# Patient Record
Sex: FEMALE | Race: WHITE | Employment: OTHER | URBAN - METROPOLITAN AREA
[De-identification: names, ages, dates, MRNs, and addresses within clinical notes are randomized per-mention and may not be internally consistent; named-entity substitution may affect disease eponyms.]

---

## 2017-03-06 ENCOUNTER — OFFICE VISIT (OUTPATIENT)
Dept: ORTHOPEDIC SURGERY | Age: 57
End: 2017-03-06

## 2017-03-06 VITALS
TEMPERATURE: 98 F | HEIGHT: 66 IN | BODY MASS INDEX: 34.72 KG/M2 | WEIGHT: 216 LBS | HEART RATE: 71 BPM | SYSTOLIC BLOOD PRESSURE: 152 MMHG | DIASTOLIC BLOOD PRESSURE: 90 MMHG

## 2017-03-06 DIAGNOSIS — Z96.652 STATUS POST TOTAL LEFT KNEE REPLACEMENT: Primary | ICD-10-CM

## 2017-03-06 DIAGNOSIS — M17.11 PRIMARY OSTEOARTHRITIS OF RIGHT KNEE: ICD-10-CM

## 2017-03-06 PROCEDURE — 99213 OFFICE O/P EST LOW 20 MIN: CPT | Performed by: ORTHOPAEDIC SURGERY

## 2017-03-06 PROCEDURE — 73562 X-RAY EXAM OF KNEE 3: CPT | Performed by: ORTHOPAEDIC SURGERY

## 2017-03-06 PROCEDURE — 20610 DRAIN/INJ JOINT/BURSA W/O US: CPT | Performed by: ORTHOPAEDIC SURGERY

## 2017-03-14 ENCOUNTER — HOSPITAL ENCOUNTER (OUTPATIENT)
Dept: ENDOSCOPY | Age: 57
Discharge: HOME | End: 2017-03-14
Attending: INTERNAL MEDICINE | Admitting: INTERNAL MEDICINE

## 2017-03-14 VITALS
WEIGHT: 216 LBS | TEMPERATURE: 98.9 F | DIASTOLIC BLOOD PRESSURE: 56 MMHG | HEART RATE: 70 BPM | HEIGHT: 66 IN | OXYGEN SATURATION: 97 % | RESPIRATION RATE: 18 BRPM | SYSTOLIC BLOOD PRESSURE: 110 MMHG | BODY MASS INDEX: 34.72 KG/M2

## 2017-03-14 RX ORDER — SODIUM CHLORIDE 0.9 % (FLUSH) 0.9 %
10 SYRINGE (ML) INJECTION EVERY 12 HOURS SCHEDULED
Status: DISCONTINUED | OUTPATIENT
Start: 2017-03-14 | End: 2017-03-15 | Stop reason: HOSPADM

## 2017-03-14 RX ORDER — SODIUM CHLORIDE 0.9 % (FLUSH) 0.9 %
10 SYRINGE (ML) INJECTION PRN
Status: DISCONTINUED | OUTPATIENT
Start: 2017-03-14 | End: 2017-03-15 | Stop reason: HOSPADM

## 2017-03-14 RX ORDER — MULTIVIT WITH MINERALS/LUTEIN
1000 TABLET ORAL DAILY
COMMUNITY
End: 2019-06-10

## 2017-03-14 RX ORDER — SODIUM CHLORIDE 9 MG/ML
INJECTION, SOLUTION INTRAVENOUS CONTINUOUS
Status: DISCONTINUED | OUTPATIENT
Start: 2017-03-14 | End: 2017-03-15 | Stop reason: HOSPADM

## 2017-03-14 RX ORDER — PROMETHAZINE HYDROCHLORIDE 25 MG/ML
6.25 INJECTION, SOLUTION INTRAMUSCULAR; INTRAVENOUS
Status: ACTIVE | OUTPATIENT
Start: 2017-03-14 | End: 2017-03-14

## 2017-03-14 RX ORDER — LABETALOL HYDROCHLORIDE 5 MG/ML
5 INJECTION, SOLUTION INTRAVENOUS EVERY 10 MIN PRN
Status: DISCONTINUED | OUTPATIENT
Start: 2017-03-14 | End: 2017-03-15 | Stop reason: HOSPADM

## 2017-03-14 RX ADMIN — SODIUM CHLORIDE: 9 INJECTION, SOLUTION INTRAVENOUS at 08:30

## 2017-03-14 ASSESSMENT — PAIN SCALES - GENERAL
PAINLEVEL_OUTOF10: 0

## 2017-03-14 ASSESSMENT — PAIN - FUNCTIONAL ASSESSMENT: PAIN_FUNCTIONAL_ASSESSMENT: 0-10

## 2017-03-14 ASSESSMENT — PAIN DESCRIPTION - DESCRIPTORS: DESCRIPTORS: CRAMPING

## 2017-03-14 ASSESSMENT — ENCOUNTER SYMPTOMS: SHORTNESS OF BREATH: 0

## 2017-07-17 ENCOUNTER — OFFICE VISIT (OUTPATIENT)
Dept: ORTHOPEDIC SURGERY | Age: 57
End: 2017-07-17

## 2017-07-17 VITALS
SYSTOLIC BLOOD PRESSURE: 126 MMHG | WEIGHT: 216 LBS | HEIGHT: 66 IN | BODY MASS INDEX: 34.72 KG/M2 | TEMPERATURE: 98.4 F | HEART RATE: 87 BPM | DIASTOLIC BLOOD PRESSURE: 84 MMHG

## 2017-07-17 DIAGNOSIS — Z96.652 STATUS POST TOTAL LEFT KNEE REPLACEMENT: Primary | ICD-10-CM

## 2017-07-17 DIAGNOSIS — M17.11 ARTHRITIS OF RIGHT KNEE: ICD-10-CM

## 2017-07-17 PROCEDURE — 99214 OFFICE O/P EST MOD 30 MIN: CPT | Performed by: ORTHOPAEDIC SURGERY

## 2017-07-24 ENCOUNTER — TELEPHONE (OUTPATIENT)
Dept: ORTHOPEDIC SURGERY | Age: 57
End: 2017-07-24

## 2017-07-24 DIAGNOSIS — M79.662 BILATERAL CALF PAIN: Primary | ICD-10-CM

## 2017-07-24 DIAGNOSIS — M79.661 BILATERAL CALF PAIN: Primary | ICD-10-CM

## 2017-07-25 ENCOUNTER — TELEPHONE (OUTPATIENT)
Dept: ORTHOPEDIC SURGERY | Age: 57
End: 2017-07-25

## 2017-08-21 ENCOUNTER — HOSPITAL ENCOUNTER (OUTPATIENT)
Dept: VASCULAR LAB | Age: 57
Discharge: HOME | End: 2017-08-21
Attending: ORTHOPAEDIC SURGERY | Admitting: ORTHOPAEDIC SURGERY

## 2017-08-29 ENCOUNTER — HOSPITAL ENCOUNTER (OUTPATIENT)
Dept: PREADMISSION TESTING | Age: 57
Discharge: HOME | End: 2017-08-29
Attending: ORTHOPAEDIC SURGERY | Admitting: ORTHOPAEDIC SURGERY

## 2017-08-29 DIAGNOSIS — Z01.818 ENCOUNTER FOR PREADMISSION TESTING: ICD-10-CM

## 2017-08-29 LAB
ABO/RH: NORMAL
ALBUMIN SERPL-MCNC: 4.6 G/DL (ref 3.4–5)
ANION GAP SERPL CALCULATED.3IONS-SCNC: 14 MMOL/L (ref 3–16)
ANTIBODY SCREEN: NORMAL
APTT: 33 SEC (ref 24.1–34.9)
BASOPHILS ABSOLUTE: 0.1 K/UL (ref 0–0.2)
BASOPHILS RELATIVE PERCENT: 1.2 %
BILIRUBIN URINE: NEGATIVE
BLOOD, URINE: NEGATIVE
BUN BLDV-MCNC: 19 MG/DL (ref 7–20)
CALCIUM SERPL-MCNC: 10 MG/DL (ref 8.3–10.6)
CHLORIDE BLD-SCNC: 100 MMOL/L (ref 99–110)
CLARITY: CLEAR
CO2: 24 MMOL/L (ref 21–32)
COLOR: YELLOW
CREAT SERPL-MCNC: 0.6 MG/DL (ref 0.6–1.1)
EKG ATRIAL RATE: 72 BPM
EKG DIAGNOSIS: NORMAL
EKG P AXIS: 5 DEGREES
EKG P-R INTERVAL: 176 MS
EKG Q-T INTERVAL: 394 MS
EKG QRS DURATION: 86 MS
EKG QTC CALCULATION (BAZETT): 431 MS
EKG R AXIS: -3 DEGREES
EKG T AXIS: 5 DEGREES
EKG VENTRICULAR RATE: 72 BPM
EOSINOPHILS ABSOLUTE: 0.3 K/UL (ref 0–0.6)
EOSINOPHILS RELATIVE PERCENT: 2.8 %
ESTIMATED AVERAGE GLUCOSE: 116.9 MG/DL
GFR AFRICAN AMERICAN: >60
GFR NON-AFRICAN AMERICAN: >60
GLUCOSE BLD-MCNC: 95 MG/DL (ref 70–99)
GLUCOSE URINE: NEGATIVE MG/DL
HBA1C MFR BLD: 5.7 %
HCT VFR BLD CALC: 44.6 % (ref 36–48)
HEMOGLOBIN: 15.1 G/DL (ref 12–16)
INR BLD: 0.93 (ref 0.85–1.15)
KETONES, URINE: NEGATIVE MG/DL
LEUKOCYTE ESTERASE, URINE: NEGATIVE
LYMPHOCYTES ABSOLUTE: 2.3 K/UL (ref 1–5.1)
LYMPHOCYTES RELATIVE PERCENT: 23.1 %
MCH RBC QN AUTO: 29.9 PG (ref 26–34)
MCHC RBC AUTO-ENTMCNC: 33.9 G/DL (ref 31–36)
MCV RBC AUTO: 88 FL (ref 80–100)
MICROSCOPIC EXAMINATION: NORMAL
MONOCYTES ABSOLUTE: 1 K/UL (ref 0–1.3)
MONOCYTES RELATIVE PERCENT: 9.7 %
NEUTROPHILS ABSOLUTE: 6.4 K/UL (ref 1.7–7.7)
NEUTROPHILS RELATIVE PERCENT: 63.2 %
NITRITE, URINE: NEGATIVE
PDW BLD-RTO: 14 % (ref 12.4–15.4)
PH UA: 5.5
PLATELET # BLD: 134 K/UL (ref 135–450)
PMV BLD AUTO: 7.8 FL (ref 5–10.5)
POTASSIUM SERPL-SCNC: 4.7 MMOL/L (ref 3.5–5.1)
PREALBUMIN: 25.9 MG/DL (ref 20–40)
PROTEIN UA: NEGATIVE MG/DL
PROTHROMBIN TIME: 10.5 SEC (ref 9.6–13)
RBC # BLD: 5.07 M/UL (ref 4–5.2)
SEDIMENTATION RATE, ERYTHROCYTE: 16 MM/HR (ref 0–30)
SODIUM BLD-SCNC: 138 MMOL/L (ref 136–145)
SPECIFIC GRAVITY UA: 1.01
URINE REFLEX TO CULTURE: NORMAL
URINE TYPE: NORMAL
UROBILINOGEN, URINE: 0.2 E.U./DL
VITAMIN D 25-HYDROXY: 34 NG/ML
WBC # BLD: 10.2 K/UL (ref 4–11)

## 2017-08-30 LAB — MRSA SCREEN RT-PCR: NORMAL

## 2017-08-31 ENCOUNTER — OFFICE VISIT (OUTPATIENT)
Dept: ORTHOPEDIC SURGERY | Age: 57
End: 2017-08-31

## 2017-08-31 DIAGNOSIS — M17.11 PRIMARY OSTEOARTHRITIS OF RIGHT KNEE: Primary | ICD-10-CM

## 2017-08-31 PROCEDURE — 99214 OFFICE O/P EST MOD 30 MIN: CPT | Performed by: ORTHOPAEDIC SURGERY

## 2017-09-04 ENCOUNTER — TELEPHONE (OUTPATIENT)
Dept: ORTHOPEDIC SURGERY | Age: 57
End: 2017-09-04

## 2017-09-06 ENCOUNTER — SURG/PROC ORDERS (OUTPATIENT)
Dept: ANESTHESIOLOGY | Age: 57
End: 2017-09-06

## 2017-09-06 ENCOUNTER — PAT TELEPHONE (OUTPATIENT)
Dept: PREADMISSION TESTING | Age: 57
End: 2017-09-06

## 2017-09-06 VITALS — HEIGHT: 66 IN | WEIGHT: 217 LBS | BODY MASS INDEX: 34.87 KG/M2

## 2017-09-06 RX ORDER — SODIUM CHLORIDE 9 MG/ML
INJECTION, SOLUTION INTRAVENOUS CONTINUOUS
Status: CANCELLED | OUTPATIENT
Start: 2017-09-06

## 2017-09-06 RX ORDER — SODIUM CHLORIDE 0.9 % (FLUSH) 0.9 %
10 SYRINGE (ML) INJECTION EVERY 12 HOURS SCHEDULED
Status: CANCELLED | OUTPATIENT
Start: 2017-09-06

## 2017-09-06 RX ORDER — CELECOXIB 100 MG/1
200 CAPSULE ORAL ONCE
Status: CANCELLED | OUTPATIENT
Start: 2017-09-08

## 2017-09-06 RX ORDER — FAMOTIDINE 40 MG/1
40 TABLET, FILM COATED ORAL NIGHTLY PRN
COMMUNITY
End: 2022-03-11

## 2017-09-06 RX ORDER — SODIUM CHLORIDE 0.9 % (FLUSH) 0.9 %
10 SYRINGE (ML) INJECTION PRN
Status: CANCELLED | OUTPATIENT
Start: 2017-09-06

## 2017-09-08 PROBLEM — M17.11 ARTHRITIS OF RIGHT KNEE: Status: ACTIVE | Noted: 2017-09-08

## 2017-09-12 ENCOUNTER — TELEPHONE (OUTPATIENT)
Dept: ORTHOPEDIC SURGERY | Age: 57
End: 2017-09-12

## 2017-09-12 ENCOUNTER — HOSPITAL ENCOUNTER (OUTPATIENT)
Dept: OTHER | Age: 57
Discharge: HOME | End: 2017-09-30
Attending: ORTHOPAEDIC SURGERY | Admitting: ORTHOPAEDIC SURGERY

## 2017-09-12 DIAGNOSIS — Z96.651 STATUS POST TOTAL RIGHT KNEE REPLACEMENT: Primary | ICD-10-CM

## 2017-09-12 NOTE — FLOWSHEET NOTE
walker.            Flexibility L R Comment   Hamstring   fair     Gastroc   fair     ITB   nt     Quad   nt                               ROM PROM AROM Overpressure Comment     L R L R L R     Flexion   80             Extension   3-                                                    Poor VMO and qaud mm tone  Strength L R Comment   Quad   deferred     Hamstring         Gastroc         Hip flexor         Hip ABD                                Orthopedic Special Tests:n/a post-op visit  Special Test Results/Comment   Meniscal Click     Crepitus     Flexion Test     Valgus Laxity     Varus Laxity     Lachmans     Drop Back     Homans              Slight diffuse swelling  Girth L R   Mid Patella       Suprapatellar       5cm above       15cm above           RESTRICTIONS/PRECAUTIONS:     Exercises/Interventions:     Exercise/Equipment Resistance/Repetitions Other comments   Stretching     Hamstring 5x30    Hip Flexion     ITB     Grion     Quad     Inclined Calf     Towel Pull 5x30         SLR     Supine 3x5    Prone     Abduction 3x5    Adducton     SLR+     Clams                    Isometrics     Quad sets 10x10    Hip Adduction 10x10    Hamstring     Glut 10x10              Patellar Glides     Medial     Superior     Inferior          ROM     Sheet Pulls 10x10    Wall Slides     Edge of bed     Flexionator     Extensionator     Hang Weights     Ankle Pumps 3'                             CKC     Calf raises     Wall sits     Step ups     Step up and over     Lateral Step Downs               Mini squats     CC TKE          Lateral band walks     Side Planks     Half moon     Single leg flow          Biodex-balance     Single leg stance     Plyoback          Stool scoots     SB bridge     SB HS Curls     Planks          PRE     Extension  RANGE: 90/40   Flexion  RANGE: 0/90        Cable Column          Leg Press  RANGE: 70/10        Bike     Treadmill                     Therapeutic Exercise and NMR EXR  [x] ([id number]) care, mobility, lifting and ambulation. Modalities:  15' post    Charges:  Timed Code Treatment Minutes: 55   Total Treatment Minutes: 63     [x] EVAL (LOW) 34814   [] EVAL (MOD) 61952   [] EVAL (HIGH) 79438   [] RE-EVAL   [x] VW(89134) x  1   [] IONTO  [x] NMR (74821) x  1   [] VASO  [] Manual (32503) x       [] Other:  [] TA x       [] Mech Traction (07356)  [] ES(attended) (36929)      [] ES (un) (98318):       GOALS:  Patient stated goal: bend without pain and be able to amb on stairs. Therapist goals for Patient:   Short Term Goals: To be achieved in: 2 weeks  1. Independent in HEP and progression per patient tolerance, in order to prevent re-injury. 2. Patient will have a decrease in pain to facilitate improvement in movement, function, and ADLs as indicated by Functional Deficits. Long Term Goals: To be achieved in: 4 weeks  1. Disability index score of 50% or less for the LEFS to assist with reaching prior level of function. 2. Patient will demonstrate increased AROM to RLE to allow for proper joint functioning as indicated by patients Functional Deficits. 3. Patient will demonstrate an increase in Strength to RLE to allow for proper functional mobility as indicated by patients Functional Deficits. 4. Patient will return to I functional activities without increased symptoms or restriction. 5. bend without pain and be able to amb on stairs    Progression Towards Functional goals:  [] Patient is progressing as expected towards functional goals listed. [] Progression is slowed due to complexities listed. [] Progression has been slowed due to co-morbidities.   [x] Plan just implemented, too soon to assess goals progression  [] Other:     ASSESSMENT:  See eval    Treatment/Activity Tolerance:  [] Patient tolerated treatment well [] Patient limited by fatique  [x] Patient limited by pain  [] Patient limited by other medical complications  [] Other:     Prognosis: [x] Good [] Fair  []

## 2017-09-12 NOTE — PLAN OF CARE
[address], [address] [place], [address]     Phone: [phone]   Fax: [phone]                                                           Physical Therapy Certification    Dear Referring Practitioner: Darcie Jordan,    We had the pleasure of evaluating the following patient for physical therapy services at [address] [place] [address]. A summary of our findings can be found in the initial assessment below. This includes our plan of care. If you have any questions or concerns regarding these findings, please do not hesitate to contact me at the office phone number checked above. Thank you for the referral.       Physician Signature:_______________________________Date:__________________  By signing above (or electronic signature), therapists plan is approved by physician      Patient: Destiney Bhandari   : 1960   MRN: [id number]  Referring Physician: Referring Practitioner: Darcie Jordan      Evaluation Date: 2017      Medical Diagnosis Information:  Diagnosis: U24.157 (ICD-10-CM) - Status post total right knee replacement / ICD-10-CM Code M25.561 Pain in right knee   Treatment Diagnosis: ICD-10-CM Code M25.561 Pain in right knee                                           Precautions/ Contra-indications:   Latex Allergy:  [x]NO      []YES  Preferred Language for Healthcare:   [x]English       []other:    SUBJECTIVE: Patient stated complaint: taken from Md intake- pre-op; Patient is a 60 y.o. female who is here for pre operative discussion of a right sided total knee arthroplasty. The date of surgery is tenatively scheduled on 17. We went over the risks and complications of joint replacement including; bleeding, infection, decreased ROM, continued pain, instability, fracture, dislocation, DVT, pulmonary embolism and need for further surgical procedures.  The patient understands these issues and we will see the patient in the score (>12s at risk):     [] Falls education provided, including       G-Codes:  PT G-Codes  Functional Assessment Tool Used: WOMAC- 9-12-17  Score: 100%- CN  Functional Limitation: Mobility: Walking and moving around  Mobility: Walking and Moving Around Current Status (): At least 60 percent but less than 80 percent impaired, limited or restricted  Mobility: Walking and Moving Around Goal Status (): At least 1 percent but less than 20 percent impaired, limited or restricted    ASSESSMENT:   Functional Impairments:     [x]Noted lumbar/proximal hip/LE joint hypomobility   [x]Decreased LE functional ROM   [x]Decreased core/proximal hip strength and neuromuscular control   [x]Decreased LE functional strength   [x]Reduced balance/proprioceptive control   []other:      Functional Activity Limitations (from functional questionnaire and intake)   [x]Reduced ability to tolerate prolonged functional positions   [x]Reduced ability or difficulty with changes of positions or transfers between positions   [x]Reduced ability to maintain good posture and demonstrate good body mechanics with sitting, bending, and lifting   [x]Reduced ability to sleep   [x] Reduced ability or tolerance with driving and/or computer work   [x]Reduced ability to perform lifting, carrying tasks   [x]Reduced ability to squat   [x]Reduced ability to forward bend   [x]Reduced ability to ambulate prolonged functional periods/distances/surfaces   [x]Reduced ability to ascend/descend stairs   []Reduced ability to run, hop, cut or jump   []other:    Participation Restrictions   [x]Reduced participation in self care activities   [x]Reduced participation in home management activities   []Reduced participation in work activities   [x]Reduced participation in social activities. []Reduced participation in sport/recreation activities.     Classification :    [x]Signs/symptoms consistent with post-surgical status including decreased ROM, strength and function. []Signs/symptoms consistent with joint sprain/strain   []Signs/symptoms consistent with patella-femoral syndrome   []Signs/symptoms consistent with knee OA/hip OA   []Signs/symptoms consistent with internal derangement of knee/Hip   []Signs/symptoms consistent with functional hip weakness/NMR control      []Signs/symptoms consistent with tendinitis/tendinosis    []signs/symptoms consistent with pathology which may benefit from Dry needling      []other:      Prognosis/Rehab Potential:      []Excellent   [x]Good    []Fair   []Poor    Tolerance of evaluation/treatment:    []Excellent   [x]Good    []Fair   []Poor    PLAN  Frequency/Duration:  2 days per week for 4 Weeks:  Interventions:  [x]  Therapeutic exercise including: strength training, ROM, for Lower extremity and core   [x]  NMR activation and proprioception for LE, Glutes and Core   [x]  Manual therapy as indicated for LE, Hip and spine to include: Dry Needling/IASTM, STM, PROM, Gr I-IV mobilizations, manipulation. [x] Modalities as needed that may include: thermal agents, E-stim, Biofeedback, US, iontophoresis as indicated  [x] Patient education on joint protection, postural re-education, activity modification, progression of HEP. HEP instruction: (see scanned forms)    GOALS:  Patient stated goal: bend without pain and be able to amb on stairs. Therapist goals for Patient:   Short Term Goals: To be achieved in: 2 weeks  1. Independent in HEP and progression per patient tolerance, in order to prevent re-injury. 2. Patient will have a decrease in pain to facilitate improvement in movement, function, and ADLs as indicated by Functional Deficits. Long Term Goals: To be achieved in: 4 weeks  1. Disability index score of 50% or less for the LEFS to assist with reaching prior level of function. 2. Patient will demonstrate increased AROM to RLE to allow for proper joint functioning as indicated by patients Functional Deficits.    3.

## 2017-09-14 ENCOUNTER — HOSPITAL ENCOUNTER (OUTPATIENT)
Dept: PHYSICAL THERAPY | Age: 57
Discharge: HOME | End: 2017-09-15
Admitting: ORTHOPAEDIC SURGERY

## 2017-09-22 ENCOUNTER — HOSPITAL ENCOUNTER (OUTPATIENT)
Dept: PHYSICAL THERAPY | Age: 57
Discharge: HOME | End: 2017-09-23
Admitting: ORTHOPAEDIC SURGERY

## 2017-09-25 ENCOUNTER — OFFICE VISIT (OUTPATIENT)
Dept: ORTHOPEDIC SURGERY | Age: 57
End: 2017-09-25

## 2017-09-25 VITALS — HEIGHT: 66 IN | WEIGHT: 215 LBS | BODY MASS INDEX: 34.55 KG/M2 | RESPIRATION RATE: 16 BRPM

## 2017-09-25 DIAGNOSIS — Z96.651 STATUS POST TOTAL RIGHT KNEE REPLACEMENT: Primary | ICD-10-CM

## 2017-09-25 PROCEDURE — 99024 POSTOP FOLLOW-UP VISIT: CPT | Performed by: ORTHOPAEDIC SURGERY

## 2017-09-26 ENCOUNTER — TELEPHONE (OUTPATIENT)
Dept: ORTHOPEDIC SURGERY | Age: 57
End: 2017-09-26

## 2017-09-26 ENCOUNTER — HOSPITAL ENCOUNTER (OUTPATIENT)
Dept: PHYSICAL THERAPY | Age: 57
Discharge: HOME | End: 2017-09-27
Admitting: ORTHOPAEDIC SURGERY

## 2017-09-28 ENCOUNTER — HOSPITAL ENCOUNTER (OUTPATIENT)
Dept: PHYSICAL THERAPY | Age: 57
Discharge: HOME | End: 2017-09-29
Admitting: ORTHOPAEDIC SURGERY

## 2017-10-03 ENCOUNTER — HOSPITAL ENCOUNTER (OUTPATIENT)
Dept: PHYSICAL THERAPY | Age: 57
Discharge: HOME | End: 2017-10-04
Admitting: ORTHOPAEDIC SURGERY

## 2017-10-05 ENCOUNTER — HOSPITAL ENCOUNTER (OUTPATIENT)
Dept: PHYSICAL THERAPY | Age: 57
Discharge: HOME | End: 2017-10-06
Admitting: ORTHOPAEDIC SURGERY

## 2017-10-10 ENCOUNTER — HOSPITAL ENCOUNTER (OUTPATIENT)
Dept: PHYSICAL THERAPY | Age: 57
Discharge: HOME | End: 2017-10-11
Admitting: ORTHOPAEDIC SURGERY

## 2017-10-12 ENCOUNTER — OFFICE VISIT (OUTPATIENT)
Dept: ORTHOPEDIC SURGERY | Age: 57
End: 2017-10-12

## 2017-10-12 ENCOUNTER — HOSPITAL ENCOUNTER (OUTPATIENT)
Dept: PHYSICAL THERAPY | Age: 57
Discharge: HOME | End: 2017-10-13
Admitting: ORTHOPAEDIC SURGERY

## 2017-10-12 VITALS — RESPIRATION RATE: 16 BRPM | WEIGHT: 215 LBS | BODY MASS INDEX: 34.55 KG/M2 | TEMPERATURE: 98.2 F | HEIGHT: 66 IN

## 2017-10-12 DIAGNOSIS — Z96.651 STATUS POST TOTAL RIGHT KNEE REPLACEMENT: Primary | ICD-10-CM

## 2017-10-12 PROCEDURE — 99024 POSTOP FOLLOW-UP VISIT: CPT | Performed by: ORTHOPAEDIC SURGERY

## 2017-10-12 NOTE — PROGRESS NOTES
This dictation was done with [employer] dictation and may contain mechanical errors related to translation. Temperature 98.2 °F (36.8 °C), temperature source Temporal, resp. rate 16, height 5' 6\" (1.676 m), weight 215 lb (97.5 kg), last menstrual period 09/08/2012, not currently breastfeeding.

## 2017-10-14 NOTE — PROGRESS NOTES
Patient is a 57-year-old female status post right total knee arthroplasty on 9/8/2017 she is now over 4 weeks postop and is doing well. Physical examination 57-year-old female oriented ×3 temperature is 98.2. Examination of the right knee shows a stable healing anterior wound full extension to 123° of flexion. No signs of instability or deep sepsis are noted in the right knee. Distal neurovascular examinations intact to the right foot and ankle. Impression 57-year-old female 1 months stable status post right total knee arthroplasty. Plan continue physical therapy strengthening range of motion exercises and follow-up in 3-4 months with x-rays at that time.

## 2017-10-17 ENCOUNTER — HOSPITAL ENCOUNTER (OUTPATIENT)
Dept: PHYSICAL THERAPY | Age: 57
Discharge: HOME | End: 2017-10-18
Admitting: ORTHOPAEDIC SURGERY

## 2017-10-19 ENCOUNTER — HOSPITAL ENCOUNTER (OUTPATIENT)
Dept: PHYSICAL THERAPY | Age: 57
Discharge: HOME | End: 2017-10-20
Admitting: ORTHOPAEDIC SURGERY

## 2017-10-24 ENCOUNTER — HOSPITAL ENCOUNTER (OUTPATIENT)
Dept: PHYSICAL THERAPY | Age: 57
Discharge: HOME | End: 2017-10-25
Admitting: ORTHOPAEDIC SURGERY

## 2017-10-26 ENCOUNTER — HOSPITAL ENCOUNTER (OUTPATIENT)
Dept: PHYSICAL THERAPY | Age: 57
Discharge: HOME | End: 2017-10-27
Admitting: ORTHOPAEDIC SURGERY

## 2017-10-31 ENCOUNTER — HOSPITAL ENCOUNTER (OUTPATIENT)
Dept: PHYSICAL THERAPY | Age: 57
Discharge: HOME | End: 2017-11-01
Admitting: ORTHOPAEDIC SURGERY

## 2017-11-01 ENCOUNTER — HOSPITAL ENCOUNTER (OUTPATIENT)
Dept: OTHER | Age: 57
Discharge: HOME | End: 2017-11-30
Attending: ORTHOPAEDIC SURGERY | Admitting: ORTHOPAEDIC SURGERY

## 2017-11-02 ENCOUNTER — HOSPITAL ENCOUNTER (OUTPATIENT)
Dept: PHYSICAL THERAPY | Age: 57
Discharge: HOME | End: 2017-11-03
Admitting: ORTHOPAEDIC SURGERY

## 2017-11-07 ENCOUNTER — HOSPITAL ENCOUNTER (OUTPATIENT)
Dept: PHYSICAL THERAPY | Age: 57
Discharge: HOME | End: 2017-11-08
Admitting: ORTHOPAEDIC SURGERY

## 2017-11-14 ENCOUNTER — HOSPITAL ENCOUNTER (OUTPATIENT)
Dept: PHYSICAL THERAPY | Age: 57
Discharge: HOME | End: 2017-11-15
Admitting: ORTHOPAEDIC SURGERY

## 2017-11-30 ENCOUNTER — HOSPITAL ENCOUNTER (OUTPATIENT)
Dept: PHYSICAL THERAPY | Age: 57
Discharge: HOME | End: 2017-12-01
Admitting: ORTHOPAEDIC SURGERY

## 2017-12-01 ENCOUNTER — HOSPITAL ENCOUNTER (OUTPATIENT)
Dept: OTHER | Age: 57
Discharge: HOME | End: 2017-12-31
Attending: ORTHOPAEDIC SURGERY | Admitting: ORTHOPAEDIC SURGERY

## 2017-12-07 ENCOUNTER — HOSPITAL ENCOUNTER (OUTPATIENT)
Dept: PHYSICAL THERAPY | Age: 57
Discharge: HOME | End: 2017-12-08
Admitting: ORTHOPAEDIC SURGERY

## 2017-12-11 ENCOUNTER — OFFICE VISIT (OUTPATIENT)
Dept: ORTHOPEDIC SURGERY | Age: 57
End: 2017-12-11

## 2017-12-11 VITALS
WEIGHT: 215 LBS | TEMPERATURE: 97.7 F | BODY MASS INDEX: 34.55 KG/M2 | HEART RATE: 76 BPM | HEIGHT: 66 IN | RESPIRATION RATE: 16 BRPM

## 2017-12-11 DIAGNOSIS — Z96.651 STATUS POST TOTAL RIGHT KNEE REPLACEMENT: Primary | ICD-10-CM

## 2017-12-11 RX ORDER — CLINDAMYCIN HYDROCHLORIDE 300 MG/1
CAPSULE ORAL
Qty: 4 CAPSULE | Refills: 0 | Status: SHIPPED | OUTPATIENT
Start: 2017-12-11 | End: 2022-03-11

## 2017-12-12 NOTE — PROGRESS NOTES
This dictation was done with [employer] dictation and may contain mechanical errors related to translation. Pulse 76, temperature 97.7 °F (36.5 °C), temperature source Temporal, resp. rate 16, height 5' 6\" (1.676 m), weight 215 lb (97.5 kg), last menstrual period 09/08/2012, not currently breastfeeding. This is a very pleasant 25-year-old female who is here just past the postoperative period. At over 3 months from now right total knee replacement on 9/8/2017. All she is doing extremely well. She's had decent range of motion well-healed incision. Pain is controlled. She was sent for x-rays at the office today including a standing AP lateral and a sunrise view of the right knee    Xray three views of the total knee arthroplasty reveals satisfactory alignment of the prosthesis . No signs of significant polyethylene wear or failure. No progressive radiolucencies,fractures, tumors or dislocations. Her exam is consistent with 0-124° of motion no varus or valgus laxity decent quad tone a well-healed incision. Good distal pulses good dorsiflexion and plantarflexion strength. She has fair symmetric motion through the hips with a mildly positive straight leg raise. Her right knee has the anterior incision is well on its way to healing without signs of infection or drainage. Neurologically intact to the right foot. My impression is stable right total knee replacement. Resolving. We will see her on an as-needed basis and she underwent he understands use of prophylactic antibiotics  During today's visit, there was approximately 15 minutes of face-to-face discussion in regards to the patient's current condition and treatment options. More than 50 % of the time was counseling and coordination of care.

## 2017-12-14 ENCOUNTER — HOSPITAL ENCOUNTER (OUTPATIENT)
Dept: PHYSICAL THERAPY | Age: 57
Discharge: HOME | End: 2017-12-15
Admitting: ORTHOPAEDIC SURGERY

## 2018-01-01 ENCOUNTER — HOSPITAL ENCOUNTER (OUTPATIENT)
Dept: OTHER | Age: 58
Discharge: HOME | End: 2018-01-31
Attending: ORTHOPAEDIC SURGERY | Admitting: ORTHOPAEDIC SURGERY

## 2018-06-11 ENCOUNTER — TELEPHONE (OUTPATIENT)
Dept: ORTHOPEDIC SURGERY | Age: 58
End: 2018-06-11

## 2018-06-11 ENCOUNTER — OFFICE VISIT (OUTPATIENT)
Dept: ORTHOPEDIC SURGERY | Age: 58
End: 2018-06-11

## 2018-06-11 VITALS
DIASTOLIC BLOOD PRESSURE: 73 MMHG | WEIGHT: 215 LBS | HEART RATE: 74 BPM | HEIGHT: 66 IN | SYSTOLIC BLOOD PRESSURE: 130 MMHG | BODY MASS INDEX: 34.55 KG/M2 | TEMPERATURE: 98.5 F

## 2018-06-11 DIAGNOSIS — M25.512 ACUTE PAIN OF LEFT SHOULDER: Primary | ICD-10-CM

## 2018-06-11 DIAGNOSIS — M75.82 ROTATOR CUFF TENDONITIS, LEFT: ICD-10-CM

## 2018-06-11 PROCEDURE — 99213 OFFICE O/P EST LOW 20 MIN: CPT | Performed by: ORTHOPAEDIC SURGERY

## 2018-06-11 PROCEDURE — 20610 DRAIN/INJ JOINT/BURSA W/O US: CPT | Performed by: ORTHOPAEDIC SURGERY

## 2018-07-23 ENCOUNTER — OFFICE VISIT (OUTPATIENT)
Dept: ORTHOPEDIC SURGERY | Age: 58
End: 2018-07-23

## 2018-07-23 VITALS
HEIGHT: 66 IN | DIASTOLIC BLOOD PRESSURE: 71 MMHG | SYSTOLIC BLOOD PRESSURE: 112 MMHG | HEART RATE: 71 BPM | TEMPERATURE: 98.5 F

## 2018-07-23 DIAGNOSIS — M75.82 ROTATOR CUFF TENDONITIS, LEFT: Primary | ICD-10-CM

## 2018-07-23 DIAGNOSIS — M19.012 ARTHRITIS OF LEFT SHOULDER REGION: ICD-10-CM

## 2018-07-23 PROCEDURE — 99212 OFFICE O/P EST SF 10 MIN: CPT | Performed by: PHYSICIAN ASSISTANT

## 2018-07-23 PROCEDURE — 20610 DRAIN/INJ JOINT/BURSA W/O US: CPT | Performed by: PHYSICIAN ASSISTANT

## 2018-07-24 NOTE — PROGRESS NOTES
Subjective:      Patient ID: Lucero Ascencio is a 59 y.o.  female. Chief Complaint   Patient presents with    Shoulder Pain     Left shoulder        HPI: She is here for follow-up treatment. She is status post left subacromial cortisone injection for left shoulder pain on 2018. She states the injection provided very little if any relief. She still having a moderate left shoulder pain with activity. She has x-ray demonstrating moderate glenohumeral arthritis. She denies any traumatic injury. She denies any significant neck pain, numbness or tingling in the upper extremity. Pain is worse with physical activity. Pain does improve with rest.     Review of Systems:   Negative for fever or chills.       Past Medical History:   Diagnosis Date    Acid reflux     Clotting disorder ([facility] [address]     factor 2    Diverticulitis     Hypertension        Family History   Problem Relation Age of Onset    Breast Cancer Mother        Past Surgical History:   Procedure Laterality Date    ABDOMINAL HERNIA REPAIR      ACHILLES TENDON SURGERY      Lengtheening    ADENOIDECTOMY      CARPAL TUNNEL RELEASE      Bilateral     SECTION   &    [address]    JOINT REPLACEMENT Left 2016    tkr    OTHER SURGICAL HISTORY      Left Thumb Repair [employer]    SEPTOPLASTY      Nasal Septal Repair     TENDON RELEASE      TIBIA FRACTURE SURGERY  2004    Right     TOTAL KNEE ARTHROPLASTY Right 2017    Dr Penny Mcwilliams  2009       Social History     Occupational History    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      Social History Main Topics    Smoking status: Never Smoker    Smokeless tobacco: Never Used      Comment: counseled on tobacco exposure avoidance    Alcohol use Yes      Comment: Occasionally     Drug use: No    Sexual activity: Yes     Partners: Female       Current Outpatient Prescriptions   Medication Sig Dispense Refill    clindamycin (CLEOCIN) 300 MG capsule 1 tablet by mouth 1 hour before and 1 hour after procedure 4 capsule 0    metoprolol succinate (TOPROL XL) 100 MG extended release tablet Take 100 mg by mouth daily      famotidine (PEPCID) 40 MG tablet Take 40 mg by mouth daily      rivaroxaban (XARELTO) 20 MG TABS tablet Take 1 tablet by mouth daily (with breakfast) 30 tablet 0    Ascorbic Acid (VITAMIN C) 1000 MG tablet Take 1,000 mg by mouth daily      tazarotene (TAZORAC) 0.1 % cream Apply topically nightly Apply topically nightly.  Multiple Vitamins-Minerals (OCUVITE ADULT 50+) CAPS Take by mouth      vitamin B-12 (CYANOCOBALAMIN) 250 MCG tablet Take 500 mcg by mouth daily      fluticasone (FLONASE) 50 MCG/ACT nasal spray USE 2 SPRAYS IN EACH NOSTRIL DAILY 1 Bottle 0    lisinopril (PRINIVIL;ZESTRIL) 40 MG tablet TAKE 1 TABLET BY MOUTH DAILY 30 tablet 4    sertraline (ZOLOFT) 100 MG tablet Take 100 mg by mouth daily. No current facility-administered medications for this visit. Objective:   She is alert, oriented x 3, pleasant, well nourished, developed and in no acute distress. /71   Pulse 71   Temp 98.5 °F (36.9 °C) (Temporal)   Ht 5' 6\" (1.676 m)   LMP 09/08/2012        Examination of the left shoulder shows: There is no deformity. There is no erythema. There is no  soft tissue swelling. Deltoid region is mildly tender to palpation. AC Joint is not tender to palpation. Clavicle is not tender to palpation. Bicipital Groove is not  tender to palpation. Pectoralis  is not tender to palpation. Scapula/ trapezius is not tender to palpation. There is no weakness with supraspinatus testing. There is mild pain with supraspinatus testing. Shoulder AROM-         IR to L5 ER 30          X Rays: not performed in the office today:   Diagnosis:        ICD-10-CM ICD-9-CM    1.  Rotator cuff tendonitis, left M75.82 726.10 Ambulatory referral to Physical Therapy      [place] ARTHROCENTESIS ASPIR&/INJ MAJOR JT/BURSA W/O US      [place] TRIAMCINOLONE ACETONIDE INJ   2. Arthritis of left shoulder region M19.012 716.91 Ambulatory referral to Physical Therapy      [place] ARTHROCENTESIS ASPIR&/INJ MAJOR JT/BURSA W/O US      OK TRIAMCINOLONE ACETONIDE INJ        Assessment/ Plan:      The natural history of the patient's diagnosis as well as the treatment options were discussed in full and questions were answered. Risks and benefits of the treatment options also reviewed in detail. She has left shoulder glenohumeral arthritis. Possible mild left rotator cuff tendinitis. No improvement from prior left shoulder subacromial injection. Recommend left shoulder intra-articular injection through a posterior approach. Risks and benefits of injection were discussed today and she has agreed upon left shoulder injection. This is for the treatment of her left shoulder pain due to left shoulder arthritis. Cortisone  Injection  PROCEDURE NOTE:  Pre op Diagnosis: Shoulder pain/ glenohumeral arthritis. Post op Diagnosis: Same  With the patient's permission, her left shoulder was prepped  in standard sterile fashion with  Alcohol and 2 cc of 0.25% Marcaine and 1 cc of Kenalog 40 mg was injected into the left joint space through a posterior approach without difficulty. She tolerated this well without difficulty. A band-aid was applied. She   was advised to ice the shoulder for 15-20 minutes to relieve any injection site related pain. She is interested in outpatient physical therapy. This may help with her stiffness related to glenohumeral arthritis. A therapy prescription was provided today. Follow Up: 6-8 weeks  Call or return to clinic prn if these symptoms worsen or fail to improve as anticipated.

## 2018-08-02 ENCOUNTER — HOSPITAL ENCOUNTER (OUTPATIENT)
Dept: PHYSICAL THERAPY | Age: 58
Discharge: HOME | End: 2018-08-31
Attending: ORTHOPAEDIC SURGERY | Admitting: ORTHOPAEDIC SURGERY

## 2018-08-02 NOTE — FLOWSHEET NOTE
proprioception and motor activation to allow for proper function of scapular, scapulothoracic and UE control with self care, carrying, lifting, driving/computer work. Home Exercise Program:    [x] (07395) Reviewed/Progressed HEP activities related to strengthening, flexibility, endurance, ROM of scapular, scapulothoracic and UE control with self care, reaching, carrying, lifting, house/yardwork, driving/computer work  [] (65822) Reviewed/Progressed HEP activities related to improving balance, coordination, kinesthetic sense, posture, motor skill, proprioception of scapular, scapulothoracic and UE control with self care, reaching, carrying, lifting, house/yardwork, driving/computer work      Manual Treatments:  PROM / STM / Oscillations-Mobs:  G-I, II, III, IV (PA's, Inf., Post.)  [] (27019) Provided manual therapy to mobilize soft tissue/joints of cervical/CT, scapular GHJ and UE for the purpose of modulating pain, promoting relaxation,  increasing ROM, reducing/eliminating soft tissue swelling/inflammation/restriction, improving soft tissue extensibility and allowing for proper ROM for normal function with self care, reaching, carrying, lifting, house/yardwork, driving/computer work    Modalities:  declined    Charges:  Timed Code Treatment Minutes: 30'   Total Treatment Minutes: 76'     [x] EVAL (LOW) 01579   [] EVAL (MOD) 82537   [] EVAL (HIGH) 14447   [] RE-EVAL   [x] IAN(17228) x  1   [] IONTO  [] NMR (54550) x      [] VASO  [] Manual (97505) x       [] Other:  [x] TA x  1    [] Mech Traction (75264)  [] ES(attended) (08360)      [] ES (un) (52819):     GOALS:  Patient stated goal: move left arm with minimal pain. Therapist goals for Patient:   Short Term Goals: To be achieved in: 2 weeks  1. Pt will be independent in HEP and progression per patient tolerance, in order to prevent re-injury.    2. Patient will report pain at worst less than or equal to 5/10  to facilitate improvement in movement, function, and ADLs as indicated by dunctional seficits. Long Term Goals: To be achieved in: 8 weeks  1. Pt will demo a UEFI of greater than or equal to 80% to assist with reaching prior level of function. 2. Patient will demonstrate increased AROM shoulder flexion greater than or equal to 175, ABD greater than or equal to 170, IR greater than or equal 50, ER greater than or equal to 80 to allow for proper joint functioning as indicated by patient's functional deficits. 3. Patient will demonstrate an increase in strength to 4 to allow for proper functional mobility as indicated by patient's functional deficits. 4. Patient will return to performing self care without increased symptoms or restriction. 5.  Pt will report pain at worst less than or equal to 3/10. Progression Towards Functional goals:  [] Patient is progressing as expected towards functional goals listed. [] Progression is slowed due to complexities listed. [] Progression has been slowed due to co-morbidities. [x] Plan just implemented, too soon to assess goals progression  [] Other:      ASSESSMENT:  See eval    Treatment/Activity Tolerance:  [x] Patient tolerated treatment well [] Patient limited by fatigue  [] Patient limited by pain  [] Patient limited by other medical complications  [] Other: Pt is a 58 y/o female presenting with diagnosis of left RTC tendonitis and arthrits from the MD.  Clinically, the pt presents with decreased ROM, decreased strength, decreased function, and increased pain consistent with the MD diagnosis. The pt would benefit from skilled PT to return to PLOF. Pt does have some symptoms consistent with impingement too. Hopefully we can address that to help her pain. She does have a h/o vertigo, but she does know how to manage it on her own now.       Prognosis: [] Good [] Fair  [] Poor    Patient Requires Follow-up: [x] Yes  [] No    PLAN: See eval  [] Continue per plan of care [] Alter current plan (see comments)  [x] Plan of care initiated [] Hold pending MD visit [] Discharge    Electronically signed by: Anthony Sloan DPT [id number]

## 2018-08-02 NOTE — PLAN OF CARE
7/10 which is usually at night when she is laying on her left. Type: [x]Constant   []Intermittent  []Radiating []Localized []other:     Numbness/Tingling: none    Functional Limitations/Impairments: [x]Lifting/reaching [x]Grooming [x]Carrying    [x]ADL's []Driving []Sports/Recreations   []Other:    Occupation/School: retired nurse. Living Status/Prior Level of Function: Independent with ADLs and IADLs,   (insert highest prior level of function)    OBJECTIVE:     CERV ROM WFL    Cervical Flexion     Cervical Extension     Cervical SB     Cervical rotation         ROM PROM AROM  Comment    L R L R    Flexion   170 pain 187    Abduction   135 pain 184    ER   78 pain 105    IR   48 61    Other        Other             Strength L R Comment   Flexion 3+ 4+ In available ROM   Abduction 3+ 4+    ER 4- crepitus 4    IR 4 4+    Supraspinatus      Upper Trap      Lower Trap      Mid Trap      Rhomboids      Biceps      Triceps      Horizontal Abduction      Horizontal Adduction      Lats        Special Tests Left Right   Apley Scratch IR:  ER:   Cross body: IR:  ER:  Cross Body:   Neer's     Full Can     Empty Can     Heather Half +    Nerve Tension Testing     Speed's     Gonzalez's      Spurling's     Repeated Scaption     CBA -          Reflexes/Sensation (myotomes/dermatomes): sensation intact to light touch    Joint mobility:    []Normal    [x]Hypo   []Hyper    Palpation: -TTP    Functional Mobility/Transfers: see above    Posture: forward head    Bandages/Dressings/Incisions: NA    Gait:  [facility]    Orthopedic Special Tests:                        [x] Patient history, allergies, meds reviewed. Medical chart reviewed. See intake form. Review Of Systems (ROS):  [x]Performed Review of systems (Integumentary, CardioPulmonary, Neurological) by intake and observation. Intake form has been scanned into medical record.  Patient has been instructed to contact their primary care physician regarding ROS issues if not a UEFI of greater than or equal to 80% to assist with reaching prior level of function. 2. Patient will demonstrate increased AROM shoulder flexion greater than or equal to 175, ABD greater than or equal to 170, IR greater than or equal 50, ER greater than or equal to 80 to allow for proper joint functioning as indicated by patient's functional deficits. 3. Patient will demonstrate an increase in strength to 4 to allow for proper functional mobility as indicated by patient's functional deficits. 4. Patient will return to performing self care without increased symptoms or restriction. 5.  Pt will report pain at worst less than or equal to 3/10. Physical Therapy Evaluation Complexity Justification  [x] A history of present problem with:  [] no personal factors and/or comorbidities that impact the plan of care;  []1-2 personal factors and/or comorbidities that impact the plan of care  [x]3 personal factors and/or comorbidities that impact the plan of care  [x] An examination of body systems using standardized tests and measures addressing any of the following: body structures and functions (impairments), activity limitations, and/or participation restrictions;:  [] a total of 1-2 or more elements   [x] a total of 3 or more elements   [] a total of 4 or more elements   [x] A clinical presentation with:  [x] stable and/or uncomplicated characteristics   [] evolving clinical presentation with changing characteristics  [] unstable and unpredictable characteristics;   [x] Clinical decision making of [x] low, [] moderate, [] high complexity using standardized patient assessment instrument and/or measurable assessment of functional outcome.     [x] EVAL (LOW) 40929 (typically 20 minutes face-to-face)  [] EVAL (MOD) 85459 (typically 30 minutes face-to-face)  [] EVAL (HIGH) 71532 (typically 45 minutes face-to-face)  [] Greg Hoang        Electronically signed by:  Annika Cobian, PT, DPT [id number]

## 2018-08-07 ENCOUNTER — HOSPITAL ENCOUNTER (OUTPATIENT)
Dept: PHYSICAL THERAPY | Age: 58
Discharge: HOME | End: 2018-08-08
Admitting: ORTHOPAEDIC SURGERY

## 2018-08-07 NOTE — FLOWSHEET NOTE
Orthopaedics and Sports Rehabilitation, [facility], [place], operated by [facility] DR TEOFILO TATUM      Physical Therapy Daily Treatment Note  Date:  2018    Patient Name:  Nubia Dhillon    :  1960  MRN: [id number]  Medical/Treatment Diagnosis Information:  · Diagnosis: M19.012 (ICD-10-CM) - Arthritis of left shoulder region; M75.82 (ICD-10-CM) - Rotator cuff tendonitis, left. Onset- 2017  · Treatment Diagnosis: M25.512- left shoulder pain  Insurance/Certification information:  PT Insurance Information: [facility] and [address] [place] [address]  Physician Information:  Referring Practitioner: REESE Lobo  Plan of care signed (Y/N):     Date of Patient follow up with Physician:     G-Code (if applicable):      Date G-Code Applied:  2018    PT G-Codes  Functional Assessment Tool Used: UEFI  Score: 70%  Functional Limitation: Carrying, moving and handling objects  Carrying, Moving and Handling Objects Current Status (): At least 20 percent but less than 40 percent impaired, limited or restricted  Carrying, Moving and Handling Objects Goal Status (): 0 percent impaired, limited or restricted    Progress Note: []  Yes  [x]  No  Next due by: Visit #10  2018    Latex Allergy:  [x]NO      []YES  Preferred Language for Healthcare:   [x]English       []other:    Visit # Insurance Allowable   2 30     Pain level:  3/10    SUBJECTIVE:  She has questions on some of the exercises. She is not sure if she is doing them correctly. She did feel it playing guitar at mass on  as the strap was across her shoulder.       OBJECTIVE: 18   Observation:   Test measurements:      ROM PROM AROM  Comment    L R L R    Flexion 150 pain       Abduction 115 pain       ER 62 pain       IR 62        Other        Other             Strength L R Comment   Flexion      Abduction      ER      IR      Supraspinatus      Upper Trap      Lower Trap      Mid Trap      Rhomboids      Biceps      Triceps      Horizontal Abduction      Horizontal Adduction      Lats RESTRICTIONS/PRECAUTIONS: HTN, depression/anxiety, OA, h/o vertigo    Exercises/Interventions:   Exercise/Equipment Resistance/Repetitions Other comments   Aerobic Conditioning     Aerodyne          Stretching/PROM     Wand 10x:10 flex and scap  10x:10 ER at 90 ABD    Table Slides     Wall slides      UE [place]     Pulleys     Pendulum     BB IR 10x:10 cane   SL IR     Pec doorway stretch     CBA stretch     UT stretch     LS stretch                         Isometrics     Retraction          Weight shift     Flexion 10x:10    Abduction 10x:10    External Rotation 10x:10    Internal Rotation 10x:10    Biceps     Triceps          PRE's     Flexion     Abduction     External Rotation     Internal Rotation     Shrugs 3x10 3#    EXT     Reverse Flys     Serratus 3x10    Horizontal Abd with ER     Biceps 3x10 3#    Triceps     Retraction          Cable Column/Theraband     External Rotation     Internal Rotation     Shrugs     Lats     Ext 3x10 green    Flex     Scapular Retraction 3x10 green    BIC     TRIC 3x10 green    PNF          Dynamic Stability          Plyoback            Pt Education:        Therapeutic Exercise and NMR EXR  [x] (06716) Provided verbal/tactile cueing for activities related to strengthening, flexibility, endurance, ROM  for improvements in scapular, scapulothoracic and UE control with self care, reaching, carrying, lifting, house/yardwork, driving/computer work.    [] (23872) Provided verbal/tactile cueing for activities related to improving balance, coordination, kinesthetic sense, posture, motor skill, proprioception  to assist with  scapular, scapulothoracic and UE control with self care, reaching, carrying, lifting, house/yardwork, driving/computer work.     Therapeutic Activities:    [] (01599 or 03196) Provided verbal/tactile cueing for activities related to improving balance, coordination, kinesthetic sense, posture, motor skill, proprioception and motor activation to allow for proper function of scapular, scapulothoracic and UE control with self care, carrying, lifting, driving/computer work. Home Exercise Program:    [x] (14438) Reviewed/Progressed HEP activities related to strengthening, flexibility, endurance, ROM of scapular, scapulothoracic and UE control with self care, reaching, carrying, lifting, house/yardwork, driving/computer work  [] (92379) Reviewed/Progressed HEP activities related to improving balance, coordination, kinesthetic sense, posture, motor skill, proprioception of scapular, scapulothoracic and UE control with self care, reaching, carrying, lifting, house/yardwork, driving/computer work      Manual Treatments:  PROM / STM / Oscillations-Mobs:  G-I, II, III, IV (PA's, Inf., Post.)  [] (45357) Provided manual therapy to mobilize soft tissue/joints of cervical/CT, scapular GHJ and UE for the purpose of modulating pain, promoting relaxation,  increasing ROM, reducing/eliminating soft tissue swelling/inflammation/restriction, improving soft tissue extensibility and allowing for proper ROM for normal function with self care, reaching, carrying, lifting, house/yardwork, driving/computer work    Modalities:  declined    Charges:   Timed Code Treatment Minutes: 40'   Total Treatment Minutes: 60'     [] EVAL (LOW) 98256   [] EVAL (MOD) 48910   [] EVAL (HIGH) 68533   [] RE-EVAL   [x] HB(57127) x  2   [] IONTO  [] NMR (48090) x      [] VASO  [] Manual (83190) x       [] Other:  [x] TA x  1    [] Mech Traction (66505)  [] ES(attended) (35036)      [] ES (un) (42157):     GOALS:  Patient stated goal: move left arm with minimal pain. Therapist goals for Patient:   Short Term Goals: To be achieved in: 2 weeks  1. Pt will be independent in HEP and progression per patient tolerance, in order to prevent re-injury.    2. Patient will report pain at worst less than or equal to 5/10  to facilitate improvement in movement, function, and ADLs as indicated by dunctional ficits. Long Term Goals: To be achieved in: 8 weeks  1. Pt will demo a UEFI of greater than or equal to 80% to assist with reaching prior level of function. 2. Patient will demonstrate increased AROM shoulder flexion greater than or equal to 175, ABD greater than or equal to 170, IR greater than or equal 50, ER greater than or equal to 80 to allow for proper joint functioning as indicated by patient's functional deficits. 3. Patient will demonstrate an increase in strength to 4 to allow for proper functional mobility as indicated by patient's functional deficits. 4. Patient will return to performing self care without increased symptoms or restriction. 5.  Pt will report pain at worst less than or equal to 3/10. Progression Towards Functional goals:  [] Patient is progressing as expected towards functional goals listed. [] Progression is slowed due to complexities listed. [] Progression has been slowed due to co-morbidities. [x] Plan just implemented, too soon to assess goals progression  [] Other:      ASSESSMENT:      Treatment/Activity Tolerance:  [x] Patient tolerated treatment well [] Patient limited by fatigue  [] Patient limited by pain  [] Patient limited by other medical complications  [] Other: Pt lesley tx well. She did have a difficult time figuring out her HEP. We did review these a few times today, and she did take notes for home. We did not progress her HEP due to this. Hopefully it will be easier to understand when she returns. Prognosis: [] Good [] Fair  [] Poor    Patient Requires Follow-up: [x] Yes  [] No    PLAN: Consider progressing HEP and pulleys.     [x] Continue per plan of care [] Alter current plan (see comments)  [] Plan of care initiated [] Hold pending MD visit [] Discharge    Electronically signed by: Tomeka Emmanuel [id number]

## 2018-08-09 ENCOUNTER — HOSPITAL ENCOUNTER (OUTPATIENT)
Dept: PHYSICAL THERAPY | Age: 58
Discharge: HOME | End: 2018-08-10
Admitting: ORTHOPAEDIC SURGERY

## 2018-08-09 NOTE — FLOWSHEET NOTE
Orthopaedics and Sports Rehabilitation, [facility] DR TEOFILO TATUM      Physical Therapy Daily Treatment Note  Date:  2018    Patient Name:  Janet Tomas    :  1960  MRN: [id number]  Medical/Treatment Diagnosis Information:  · Diagnosis: M19.012 (ICD-10-CM) - Arthritis of left shoulder region; M75.82 (ICD-10-CM) - Rotator cuff tendonitis, left. Onset- 2017  · Treatment Diagnosis: M25.512- left shoulder pain  Insurance/Certification information:  PT Insurance Information: [facility] and [address] [place] [address]  Physician Information:  Referring Practitioner: REESE Choi  Plan of care signed (Y/N):     Date of Patient follow up with Physician:  2018    G-Code (if applicable):      Date G-Code Applied:  2018    PT G-Codes  Functional Assessment Tool Used: UEFI  Score: 70%  Functional Limitation: Carrying, moving and handling objects  Carrying, Moving and Handling Objects Current Status (): At least 20 percent but less than 40 percent impaired, limited or restricted  Carrying, Moving and Handling Objects Goal Status (): 0 percent impaired, limited or restricted    Progress Note: []  Yes  [x]  No  Next due by: Visit #10  2018    Latex Allergy:  [x]NO      []YES  Preferred Language for Healthcare:   [x]English       []other:    Visit # Insurance Allowable   3 30     Pain level:  0/10    SUBJECTIVE:  Her pain is not bad when she is at rest.  Her pain is maybe 3/10 when she uses it.        OBJECTIVE: 18   Observation:   Test measurements:      ROM PROM AROM  Comment    L R L R    Flexion 150 pain       Abduction 115 pain       ER 62 pain       IR 62        Other        Other             Strength L R Comment   Flexion      Abduction      ER      IR      Supraspinatus      Upper Trap      Lower Trap      Mid Trap      Rhomboids      Biceps      Triceps      Horizontal Abduction      Horizontal Adduction      Lats          RESTRICTIONS/PRECAUTIONS: HTN, depression/anxiety, OA, h/o vertigo    Exercises/Interventions:   Exercise/Equipment Resistance/Repetitions Other comments   Aerobic Conditioning     Aerodyne          Stretching/PROM     Wand 10x:10 flex and scap  10x:10 ER at 90 ABD    Table Slides     Wall slides      UE [place]     Pulleys 10x:10 Flex/scap   Pendulum     BB IR 10x:10 Cane and rope   SL IR     Pec doorway stretch     CBA stretch     UT stretch     LS stretch                         Isometrics     Retraction          Weight shift     Flexion 10x:10    Abduction 10x:10    External Rotation 10x:10    Internal Rotation 10x:10    Biceps     Triceps          PRE's     Flexion     Abduction     External Rotation     Internal Rotation     Shrugs 3x10 3#    EXT     Reverse Flys     Serratus 3x10    Horizontal Abd with ER     Biceps 3x10 3#    Triceps     Retraction          Cable Column/Theraband     External Rotation     Internal Rotation     Shrugs     Lats     Ext 3x10 green    Flex     Scapular Retraction 3x10 green    BIC     TRIC 3x10 green    PNF          Dynamic Stability          Plyoback            Pt Education:        Therapeutic Exercise and NMR EXR  [x] (65906) Provided verbal/tactile cueing for activities related to strengthening, flexibility, endurance, ROM  for improvements in scapular, scapulothoracic and UE control with self care, reaching, carrying, lifting, house/yardwork, driving/computer work.    [] (69007) Provided verbal/tactile cueing for activities related to improving balance, coordination, kinesthetic sense, posture, motor skill, proprioception  to assist with  scapular, scapulothoracic and UE control with self care, reaching, carrying, lifting, house/yardwork, driving/computer work.     Therapeutic Activities:    [] (35297 or 21938) Provided verbal/tactile cueing for activities related to improving balance, coordination, kinesthetic sense, posture, motor skill, proprioception and motor activation to allow for proper function of scapular,

## 2018-08-13 ENCOUNTER — HOSPITAL ENCOUNTER (OUTPATIENT)
Dept: PHYSICAL THERAPY | Age: 58
Discharge: HOME | End: 2018-08-14
Admitting: ORTHOPAEDIC SURGERY

## 2018-08-13 NOTE — FLOWSHEET NOTE
vertigo    Exercises/Interventions:   Exercise/Equipment Resistance/Repetitions Other comments   Aerobic Conditioning     Aerodyne          Stretching/PROM     Wand 10x:10 flex and scap  10x:10 ER at 90 ABD    Table Slides     Wall slides      UE [place]     Pulleys 10x:10 Flex/scap   Pendulum     BB IR 10x:10  rope   SL IR     Pec doorway stretch     CBA stretch     UT stretch     LS stretch     UK deltoid 5' pain free motion                   Isometrics     Retraction          Weight shift     Flexion 10x:10    Abduction 10x:10    External Rotation 10x:10    Internal Rotation 10x:10    Biceps     Triceps          PRE's     Flexion     Abduction     External Rotation     Internal Rotation     Shrugs 3x10 4#    EXT     Reverse Flys     Serratus 3x10 1#    Horizontal Abd with ER     Biceps 3x10 4#    Triceps     Retraction          Cable Column/Theraband     External Rotation     Internal Rotation     Shrugs     Lats     Ext 3x10 green    Flex     Scapular Retraction 3x10 green    BIC     TRIC 3x10 green    PNF          Dynamic Stability          Plyoback            Pt Education:        Therapeutic Exercise and NMR EXR  [x] (77568) Provided verbal/tactile cueing for activities related to strengthening, flexibility, endurance, ROM  for improvements in scapular, scapulothoracic and UE control with self care, reaching, carrying, lifting, house/yardwork, driving/computer work.    [] (74960) Provided verbal/tactile cueing for activities related to improving balance, coordination, kinesthetic sense, posture, motor skill, proprioception  to assist with  scapular, scapulothoracic and UE control with self care, reaching, carrying, lifting, house/yardwork, driving/computer work.     Therapeutic Activities:    [] (13297 or 14612) Provided verbal/tactile cueing for activities related to improving balance, coordination, kinesthetic sense, posture, motor skill, proprioception and motor activation to allow for proper function of scapular, scapulothoracic and UE control with self care, carrying, lifting, driving/computer work. Home Exercise Program:    [x] (71319) Reviewed/Progressed HEP activities related to strengthening, flexibility, endurance, ROM of scapular, scapulothoracic and UE control with self care, reaching, carrying, lifting, house/yardwork, driving/computer work  [] (00021) Reviewed/Progressed HEP activities related to improving balance, coordination, kinesthetic sense, posture, motor skill, proprioception of scapular, scapulothoracic and UE control with self care, reaching, carrying, lifting, house/yardwork, driving/computer work      Manual Treatments:  PROM / STM / Oscillations-Mobs:  G-I, II, III, IV (PA's, Inf., Post.)  [] (82521) Provided manual therapy to mobilize soft tissue/joints of cervical/CT, scapular GHJ and UE for the purpose of modulating pain, promoting relaxation,  increasing ROM, reducing/eliminating soft tissue swelling/inflammation/restriction, improving soft tissue extensibility and allowing for proper ROM for normal function with self care, reaching, carrying, lifting, house/yardwork, driving/computer work    Modalities:  15' ice     Charges:   Timed Code Treatment Minutes: 47'   Total Treatment Minutes: 95'     [] EVAL (LOW) 23614   [] EVAL (MOD) 10294   [] EVAL (HIGH) 80119   [] RE-EVAL   [x] NC(23304) x  2   [] IONTO  [] NMR (48818) x      [] VASO  [] Manual (07943) x       [] Other:  [x] TA x  2    [] Mech Traction (93380)  [] ES(attended) (96455)      [] ES (un) (33821):     GOALS:  Patient stated goal: move left arm with minimal pain. Therapist goals for Patient:   Short Term Goals: To be achieved in: 2 weeks  1. Pt will be independent in HEP and progression per patient tolerance, in order to prevent re-injury. 2. Patient will report pain at worst less than or equal to 5/10  to facilitate improvement in movement, function, and ADLs as indicated by dunctional seficits.     Long Term Goals: To be achieved in: 8 weeks  1. Pt will demo a UEFI of greater than or equal to 80% to assist with reaching prior level of function. 2. Patient will demonstrate increased AROM shoulder flexion greater than or equal to 175, ABD greater than or equal to 170, IR greater than or equal 50, ER greater than or equal to 80 to allow for proper joint functioning as indicated by patient's functional deficits. 3. Patient will demonstrate an increase in strength to 4 to allow for proper functional mobility as indicated by patient's functional deficits. 4. Patient will return to performing self care without increased symptoms or restriction. 5.  Pt will report pain at worst less than or equal to 3/10. Progression Towards Functional goals:  [] Patient is progressing as expected towards functional goals listed. [] Progression is slowed due to complexities listed. [] Progression has been slowed due to co-morbidities. [x] Plan just implemented, too soon to assess goals progression  [] Other:      ASSESSMENT:      Treatment/Activity Tolerance:  [x] Patient tolerated treatment well [] Patient limited by fatigue  [] Patient limited by pain  [] Patient limited by other medical complications  [] Other:  Pt lesley tx well. Her isometrics were not progressed as these cause pain. She continues to require min VCs for proper form. She is making good progress in ROM. She does report crepitus in her shoulder, which is not surprising. She would continue to benefit from skilled PT to improve function. Prognosis: [] Good [] Fair  [] Poor    Patient Requires Follow-up: [x] Yes  [] No    PLAN: Consider progressing isometrics. Consider reviewing wall walks instead of cane exercises in order to help pt do exercises while out of town.     [x] Continue per plan of care [] Alter current plan (see comments)  [] Plan of care initiated [] Hold pending MD visit [] Discharge    Electronically signed by: Suma Arambula DPT [id number]

## 2018-08-16 ENCOUNTER — HOSPITAL ENCOUNTER (OUTPATIENT)
Dept: PHYSICAL THERAPY | Age: 58
Discharge: HOME | End: 2018-08-17
Admitting: ORTHOPAEDIC SURGERY

## 2018-08-16 NOTE — FLOWSHEET NOTE
RESTRICTIONS/PRECAUTIONS: HTN, depression/anxiety, OA, h/o vertigo    Exercises/Interventions:   Exercise/Equipment Resistance/Repetitions Other comments   Aerobic Conditioning     Aerodyne          Stretching/PROM     Wand     Table Slides     Wall slides  10x:10 Flex/scap   UE [place]     Pulleys 10x:10 Flex/scap   Pendulum     BB IR 10x:10  rope   SL IR     Pec doorway stretch     CBA stretch     UT stretch     LS stretch     UK deltoid 5' pain free motion    ER table stretch 10x:10              Isometrics     Retraction          Weight shift     Flexion     Abduction 10x:10    External Rotation 10x:10    Internal Rotation 10x:10    Biceps     Triceps          PRE's     Flexion     Abduction     External Rotation     Internal Rotation     Shrugs 3x10 4#    EXT     Reverse Flys     Serratus 3x10 1#    Horizontal Abd with ER     Biceps 3x10 4#    Triceps     Retraction          Cable Column/Theraband     External Rotation     Internal Rotation     Shrugs     Lats     Ext 3x10 green    Flex     Scapular Retraction 3x10 green    BIC     TRIC 3x10 green    PNF          Dynamic Stability          Plyoback            Pt Education:        Therapeutic Exercise and NMR EXR  [x] (70423) Provided verbal/tactile cueing for activities related to strengthening, flexibility, endurance, ROM  for improvements in scapular, scapulothoracic and UE control with self care, reaching, carrying, lifting, house/yardwork, driving/computer work.    [] (02503) Provided verbal/tactile cueing for activities related to improving balance, coordination, kinesthetic sense, posture, motor skill, proprioception  to assist with  scapular, scapulothoracic and UE control with self care, reaching, carrying, lifting, house/yardwork, driving/computer work.     Therapeutic Activities:    [] (46754 or 04605) Provided verbal/tactile cueing for activities related to improving balance, coordination, kinesthetic sense, posture, motor skill, function, and ADLs as indicated by dunctional seficits. Long Term Goals: To be achieved in: 8 weeks  1. Pt will demo a UEFI of greater than or equal to 80% to assist with reaching prior level of function. 2. Patient will demonstrate increased AROM shoulder flexion greater than or equal to 175, ABD greater than or equal to 170, IR greater than or equal 50, ER greater than or equal to 80 to allow for proper joint functioning as indicated by patient's functional deficits. 3. Patient will demonstrate an increase in strength to 4 to allow for proper functional mobility as indicated by patient's functional deficits. 4. Patient will return to performing self care without increased symptoms or restriction. 5.  Pt will report pain at worst less than or equal to 3/10. Progression Towards Functional goals:  [] Patient is progressing as expected towards functional goals listed. [] Progression is slowed due to complexities listed. [] Progression has been slowed due to co-morbidities. [x] Plan just implemented, too soon to assess goals progression  [] Other:      ASSESSMENT:      Treatment/Activity Tolerance:  [x] Patient tolerated treatment well [] Patient limited by fatigue  [] Patient limited by pain  [] Patient limited by other medical complications  [] Other: Pt lesley tx fair/well. She is not sure if she has made progress since starting. We will have her f/u after vacation and re-assess. Then she will return to see the physician. We may wean to HEP. She was given a new HEP that would be more condensed and easier to do when out of town. Prognosis: [] Good [] Fair  [] Poor    Patient Requires Follow-up: [x] Yes  [] No    PLAN: Consider progressing isometrics.      [x] Continue per plan of care [] Alter current plan (see comments)  [] Plan of care initiated [] Hold pending MD visit [] Discharge    Electronically signed by: Mio Bush, EDY [id number]

## 2018-08-28 ENCOUNTER — HOSPITAL ENCOUNTER (OUTPATIENT)
Dept: PHYSICAL THERAPY | Age: 58
Discharge: HOME | End: 2018-08-29
Admitting: ORTHOPAEDIC SURGERY

## 2018-08-28 NOTE — PLAN OF CARE
Orthopaedics and [address] [place] [address] DR TEOFILO TATUM     Physical Therapy Re-Certification Plan of Care    Dear REESE Bearden,    We had the pleasure of treating the following patient for physical therapy services at [address] [place] [address]. A summary of our findings can be found in the updated assessment below. This includes our plan of care. If you have any questions or concerns regarding these findings, please do not hesitate to contact me at the office phone number checked above. Thank you for the referral.     Physician Signature:________________________________Date:__________________  By signing above (or electronic signature), therapists plan is approved by physician    Date Range Of Visits:   Total Visits to Date: 6  Overall Response to Treatment:   [x]Patient is responding well to treatment and improvement is noted with regards  to goals   []Patient should continue to improve in reasonable time if they continue HEP   []Patient has plateaued and is no longer responding to skilled PT intervention    []Patient is getting worse and would benefit from return to referring MD   []Patient unable to adhere to initial POC   []Other: Pt lesley tx well. She does have discomfort with isometrics. Thus, we did not progress these. I've advised her to stay with pushing to her comfort. She does return for a f/u visit next week. She can continue with her HEP as a maintenance program. I did review her HEP. She will continue her strengthening 1x/d to every other day. She will do the stretching 1-2x/d. She is agreeable to this, and she feels comfortable continuing as HEP. Pt to see NP next week. If they are agreeable, pt may continue as HEP. If pt does not return, this note can be considered D/C note.                          Physical Therapy Daily Treatment Note  Date:  2018    Patient Name:  Ned Grande    :  1960  MRN: [id number]  Medical/Treatment Diagnosis depression/anxiety, OA, h/o vertigo    Exercises/Interventions:   Exercise/Equipment Resistance/Repetitions Other comments   Aerobic Conditioning     Aerodyne          Stretching/PROM     Wand     Table Slides     Wall slides  10x:10 Flex/scap   UE [place]     Pulleys 10x:10 Flex/scap   Pendulum     BB IR 10x:10  rope   SL IR     Pec doorway stretch     CBA stretch     UT stretch     LS stretch     UK deltoid 5' pain free motion    ER table stretch 10x:10              Isometrics     Retraction          Weight shift     Flexion 10x:10    Abduction 10x:10    External Rotation 10x:10    Internal Rotation 10x:10    Biceps     Triceps          PRE's     Flexion     Abduction     External Rotation     Internal Rotation     Shrugs 3x10 5#    EXT     Reverse Flys     Serratus 3x10 2#    Horizontal Abd with ER     Biceps 3x10 5#    Triceps     Retraction          Cable Column/Theraband     External Rotation     Internal Rotation     Shrugs     Lats     Ext 3x10 blue    Flex     Scapular Retraction 3x10 blue    BIC     TRIC 3x10 blue    PNF          Dynamic Stability          [employer]            Pt Education:        Therapeutic Exercise and NMR EXR  [x] (94640) Provided verbal/tactile cueing for activities related to strengthening, flexibility, endurance, ROM  for improvements in scapular, scapulothoracic and UE control with self care, reaching, carrying, lifting, house/yardwork, driving/computer work.    [] (30443) Provided verbal/tactile cueing for activities related to improving balance, coordination, kinesthetic sense, posture, motor skill, proprioception  to assist with  scapular, scapulothoracic and UE control with self care, reaching, carrying, lifting, house/yardwork, driving/computer work.     Therapeutic Activities:    [] (61349 or 60459) Provided verbal/tactile cueing for activities related to improving balance, coordination, kinesthetic sense, posture, motor skill, proprioception and motor activation to allow seficits. -met (1-2/10 at worst)    Long Term Goals: To be achieved in: 8 weeks  1. Pt will demo a UEFI of greater than or equal to 80% to assist with reaching prior level of function. -ongoing  2. Patient will demonstrate increased AROM shoulder flexion greater than or equal to 175, ABD greater than or equal to 170, IR greater than or equal 50, ER greater than or equal to 80 to allow for proper joint functioning as indicated by patient's functional deficits. -partially met   3. Patient will demonstrate an increase in strength to 4 to allow for proper functional mobility as indicated by patient's functional deficits. -ongoing  4. Patient will return to performing self care without increased symptoms or restriction. -met  5. Pt will report pain at worst less than or equal to 3/10. -met    Progression Towards Functional goals:  [] Patient is progressing as expected towards functional goals listed. [] Progression is slowed due to complexities listed. [] Progression has been slowed due to co-morbidities. [x] Plan just implemented, too soon to assess goals progression  [] Other:      ASSESSMENT:      Treatment/Activity Tolerance:  [x] Patient tolerated treatment well [] Patient limited by fatigue  [] Patient limited by pain  [] Patient limited by other medical complications  [] Other:  Pt lesley tx well. She does have discomfort with isometrics. Thus, we did not progress these. I've advised her to stay with pushing to her comfort. She does return for a f/u visit next week. She can continue with her HEP as a maintenance program. I did review her HEP. She will continue her strengthening 1x/d to every other day. She will do the stretching 1-2x/d. She is agreeable to this, and she feels comfortable continuing as HEP. Prognosis: [] Good [] Fair  [] Poor    Patient Requires Follow-up: [x] Yes  [] No    PLAN: Pt to see NP next week. If they are agreeable, pt may continue as HEP.   If pt does not return, this note

## 2018-09-01 ENCOUNTER — HOSPITAL ENCOUNTER (OUTPATIENT)
Dept: OTHER | Age: 58
Discharge: HOME | End: 2018-09-01
Attending: ORTHOPAEDIC SURGERY | Admitting: ORTHOPAEDIC SURGERY

## 2018-09-06 ENCOUNTER — OFFICE VISIT (OUTPATIENT)
Dept: ORTHOPEDIC SURGERY | Age: 58
End: 2018-09-06

## 2018-09-06 VITALS — HEIGHT: 66 IN | BODY MASS INDEX: 34.55 KG/M2 | WEIGHT: 215 LBS | TEMPERATURE: 98 F

## 2018-09-06 DIAGNOSIS — M19.012 ARTHRITIS OF LEFT SHOULDER REGION: Primary | ICD-10-CM

## 2018-09-06 PROCEDURE — 99212 OFFICE O/P EST SF 10 MIN: CPT | Performed by: PHYSICIAN ASSISTANT

## 2018-09-07 NOTE — PROGRESS NOTES
Subjective:      Patient ID: Jerri Rodarte is a 59 y.o.  female. Chief Complaint   Patient presents with    Follow-up     left shoulder        HPI: She is here for follow-up treatment. She is status post left shoulder cortisone injection 2018. She reports at least 50% relief from the injection. Therapy has helped. Currently is not having pain. Review of Systems:   Negative for fever or chills.       Past Medical History:   Diagnosis Date    Acid reflux     Clotting disorder ([address]     factor 2    Diverticulitis     Hypertension        Family History   Problem Relation Age of Onset    Breast Cancer Mother        Past Surgical History:   Procedure Laterality Date    ABDOMINAL HERNIA REPAIR      ACHILLES TENDON SURGERY      Lengtheening    ADENOIDECTOMY      CARPAL TUNNEL RELEASE      Bilateral     SECTION   &    [address]    JOINT REPLACEMENT Left 2016    tkr    OTHER SURGICAL HISTORY      Left Thumb Repair [employer]    SEPTOPLASTY      Nasal Septal Repair     TENDON RELEASE      TIBIA FRACTURE SURGERY  2004    Right     TOTAL KNEE ARTHROPLASTY Right 2017    Dr Natasha Wang  2009       Social History     Occupational History    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      Social History Main Topics    Smoking status: Never Smoker    Smokeless tobacco: Never Used      Comment: counseled on tobacco exposure avoidance    Alcohol use Yes      Comment: Occasionally     Drug use: No    Sexual activity: Yes     Partners: Female       Current Outpatient Prescriptions   Medication Sig Dispense Refill    clindamycin (CLEOCIN) 300 MG capsule 1 tablet by mouth 1 hour before and 1 hour after procedure 4 capsule 0    metoprolol succinate (TOPROL XL) 100 MG extended release tablet Take 100 mg by mouth daily      famotidine (PEPCID) 40 MG tablet Take 40 mg by mouth daily      rivaroxaban (XARELTO) 20 MG TABS tablet Take 1

## 2018-12-10 ENCOUNTER — OFFICE VISIT (OUTPATIENT)
Dept: ORTHOPEDIC SURGERY | Age: 58
End: 2018-12-10
Payer: COMMERCIAL

## 2018-12-10 VITALS
WEIGHT: 215 LBS | TEMPERATURE: 99.1 F | DIASTOLIC BLOOD PRESSURE: 80 MMHG | HEART RATE: 77 BPM | SYSTOLIC BLOOD PRESSURE: 126 MMHG | HEIGHT: 67 IN | BODY MASS INDEX: 33.74 KG/M2

## 2018-12-10 DIAGNOSIS — Z96.651 H/O TOTAL KNEE REPLACEMENT, RIGHT: ICD-10-CM

## 2018-12-10 DIAGNOSIS — Z96.652 STATUS POST TOTAL LEFT KNEE REPLACEMENT: Primary | ICD-10-CM

## 2018-12-10 PROCEDURE — 99212 OFFICE O/P EST SF 10 MIN: CPT | Performed by: PHYSICIAN ASSISTANT

## 2018-12-11 NOTE — PROGRESS NOTES
Subjective:      Patient ID: Anjali Santos is a 59 y.o. female. Chief Complaint   Patient presents with    Follow-up     right TKA 17, left TKA 16        HPI: She is here for annual follow up on bilateral knee arthroplasty. No new complaints or issues. There is minimal to no discomfort with ambulation. There is minimal to no discomfort at rest.   Steps can be performed in reciprocal fashion. Review of Systems:   Negative for fever or chills.         Past Medical History:   Diagnosis Date    Acid reflux     Clotting disorder ([address]     factor 2    Diverticulitis     Hypertension        Family History   Problem Relation Age of Onset    Breast Cancer Mother        Past Surgical History:   Procedure Laterality Date    ABDOMINAL HERNIA REPAIR      ACHILLES TENDON SURGERY      Lengtheening    ADENOIDECTOMY      CARPAL TUNNEL RELEASE      Bilateral     SECTION   &    [address]    JOINT REPLACEMENT Left 2016    tkr    OTHER SURGICAL HISTORY      Left Thumb Repair [employer]    SEPTOPLASTY      Nasal Septal Repair     TENDON RELEASE      TIBIA FRACTURE SURGERY  2004    Right     TOTAL KNEE ARTHROPLASTY Right 2017    Dr Selene Hendricks  2009       Social History     Occupational History    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      Social History Main Topics    Smoking status: Never Smoker    Smokeless tobacco: Never Used      Comment: counseled on tobacco exposure avoidance    Alcohol use Yes      Comment: Occasionally     Drug use: No    Sexual activity: Yes     Partners: Female       Current Outpatient Prescriptions   Medication Sig Dispense Refill    clindamycin (CLEOCIN) 300 MG capsule 1 tablet by mouth 1 hour before and 1 hour after procedure 4 capsule 0    metoprolol succinate (TOPROL XL) 100 MG extended release tablet Take 100 mg by mouth daily      famotidine (PEPCID) 40 MG tablet Take 40 mg by mouth daily      rivaroxaban (XARELTO) 20 MG TABS tablet Take 1 tablet by mouth daily (with breakfast) 30 tablet 0    Ascorbic Acid (VITAMIN C) 1000 MG tablet Take 1,000 mg by mouth daily      tazarotene (TAZORAC) 0.1 % cream Apply topically nightly Apply topically nightly.  Multiple Vitamins-Minerals (OCUVITE ADULT 50+) CAPS Take by mouth      vitamin B-12 (CYANOCOBALAMIN) 250 MCG tablet Take 500 mcg by mouth daily      fluticasone (FLONASE) 50 MCG/ACT nasal spray USE 2 SPRAYS IN EACH NOSTRIL DAILY 1 Bottle 0    lisinopril (PRINIVIL;ZESTRIL) 40 MG tablet TAKE 1 TABLET BY MOUTH DAILY 30 tablet 4    sertraline (ZOLOFT) 100 MG tablet Take 100 mg by mouth daily. No current facility-administered medications for this visit. Objective:     She is alert, oriented x 3, pleasant, well nourished, developed and in no acute distress. /80   Pulse 77   Temp 99.1 °F (37.3 °C)   Ht 5' 7\" (1.702 m)   Wt 215 lb (97.5 kg)   LMP 09/08/2012   BMI 33.67 kg/m²      KNEE EXAM:  Examination of the bilateral knee shows: There is  no obvious deformity. There is not erythema. There is minimal to no soft tissue swelling. There is no significant joint effusion. AROM-  Extension-     Left 0   Right 0                        Flexion-         Left 125   Right 125  There is no pain associated with ROM testing. Medial joint line is not tender to palpation. Lateral joint line is not tender to palpation. There is not crepitus along the joint line with ROM testing. There is no significant instability with varus or valgus stress testing. Anterior Drawer test is negative for instability. Extensor Mechanism is  intact. X Rays: performed in the office today:   AP, Lateral and Sunrise of the bilateral Knees : There is a bilateral prosthetic total knee arthroplasty present. The alignment is satisfactory. There are no signs of significant failure or loosening. Assessment:       ICD-10-CM    1.

## 2018-12-20 ENCOUNTER — OFFICE VISIT (OUTPATIENT)
Dept: ORTHOPEDIC SURGERY | Age: 58
End: 2018-12-20
Payer: COMMERCIAL

## 2018-12-20 VITALS
HEIGHT: 67 IN | BODY MASS INDEX: 33.74 KG/M2 | WEIGHT: 215 LBS | HEART RATE: 79 BPM | SYSTOLIC BLOOD PRESSURE: 140 MMHG | TEMPERATURE: 98.5 F | DIASTOLIC BLOOD PRESSURE: 86 MMHG

## 2018-12-20 DIAGNOSIS — M19.012 ARTHRITIS OF LEFT SHOULDER REGION: Primary | ICD-10-CM

## 2018-12-20 PROCEDURE — 99214 OFFICE O/P EST MOD 30 MIN: CPT | Performed by: ORTHOPAEDIC SURGERY

## 2018-12-23 NOTE — PROGRESS NOTES
tablet Take 100 mg by mouth daily  Historical Provider, MD   famotidine (PEPCID) 40 MG tablet Take 40 mg by mouth daily  Alexi Gomes MD   rivaroxaban (XARELTO) 20 MG TABS tablet Take 1 tablet by mouth daily (with breakfast)  Marcella Petty MD   Ascorbic Acid (VITAMIN C) 1000 MG tablet Take 1,000 mg by mouth daily  Historical Provider, MD   tazarotene (TAZORAC) 0.1 % cream Apply topically nightly Apply topically nightly. Historical Provider, MD   Multiple Vitamins-Minerals ([address] 50+) CAPS Take by mouth  Historical Provider, MD   vitamin B-12 (CYANOCOBALAMIN) 250 MCG tablet Take 500 mcg by mouth daily  Alexi Provider, MD   fluticasone (FLONASE) 50 MCG/ACT nasal spray USE 2 SPRAYS IN EACH NOSTRIL DAILY  Ashleigh Ascencio MD   lisinopril (PRINIVIL;ZESTRIL) 40 MG tablet TAKE 1 TABLET BY MOUTH DAILY  Ashleigh Ascencio MD   sertraline (ZOLOFT) 100 MG tablet Take 100 mg by mouth daily. Historical Provider, MD     Physical examination 59-year-old female oriented ×3 temperature is 98.5. Examination of her neck shows good range of motion no specific pain tenderness or instability. Motor examination to the left upper extremity shows trapezius deltoid biceps triceps wrist extensors and flexors and hand intrinsics are intact 4-5 over 5. Sensation and perfusion are intact to the left upper extremity. Axillary motor and sensory intact to the left upper extremity. There is no numbness or tingling noted in the left upper extremity. Deep tendon reflexes are 1 at the elbow and 0 to wrist of the left upper extremity. No other obvious cutaneous lesions lymphedema or cellulitic processes are noted in the left upper extremity. Examination of the left shoulder shows good range of motion however significant pain at hi degrees of abduction and overhead use and with external rotation of the shoulder joint.   No obvious signs of instability or deep sepsis noted in the left shoulder on today's exam.    X-rays in

## 2019-04-25 ENCOUNTER — TELEPHONE (OUTPATIENT)
Dept: ORTHOPEDIC SURGERY | Age: 59
End: 2019-04-25

## 2019-04-25 ENCOUNTER — OFFICE VISIT (OUTPATIENT)
Dept: ORTHOPEDIC SURGERY | Age: 59
End: 2019-04-25
Payer: COMMERCIAL

## 2019-04-25 VITALS
WEIGHT: 215 LBS | HEART RATE: 71 BPM | BODY MASS INDEX: 33.74 KG/M2 | RESPIRATION RATE: 16 BRPM | TEMPERATURE: 98.6 F | HEIGHT: 67 IN | DIASTOLIC BLOOD PRESSURE: 73 MMHG | SYSTOLIC BLOOD PRESSURE: 122 MMHG

## 2019-04-25 DIAGNOSIS — M19.012 ARTHRITIS OF LEFT SHOULDER REGION: Primary | ICD-10-CM

## 2019-04-25 PROCEDURE — 99214 OFFICE O/P EST MOD 30 MIN: CPT | Performed by: ORTHOPAEDIC SURGERY

## 2019-04-25 NOTE — LETTER
[address] [facility]        LEFT TOTAL SHOULDER REPLACEMENT                           1960                         PHYSICIANS ORDERS  HEIGHT:  5'7        WEIGHT:   215    ALLERGIES:Amoxicillin; Codeine; Iodine; Percocet [oxycodone-acetaminophen]; Tape [adhesive tape]; and Vicodin [hydrocodone-acetaminophen]           SUR-12                        JET:              _________________________________________________________________  PRE-OP ORDERS:  CBC WITH DIFFERENTIAL                                                TYPE AND SCREEN                                                            HgB A1C                                                                               EKG                                                                                        NASAL CULUTRE MRSA  UAR/if positive repeat UAR on admission  BMP  Albumin and Prealbumin  VITAMIN D LEVELS  COAG PROFILE  SED RATE  PT/OT EVAL AND TEACHING  INSTRUCT PT TO STOP ALL NSAIDS, ASPIRIN, BLOOD THINNERS 7 DAYS PRIOR SURGERY  DAY OF SURGERY  CEFAZOLIN 1 GM IVPB; IF PATIENT WEIGHS > 80 KG AND SERUM CREATININE <2.5 mg/dl, GIVE 2 GM DOSE WITHIN 1 HOUR OF INCISION.   IF THE PRE-OP NASAL CULTURE FOR MRSA WAS POSITIVE:   REPEAT NASAL SWAB ON ADMISSION AND ADMINISTER VANCOMYCIN 2 GM IVPB, REDUCE THE DOSE OF VANCOMYCIN  MG IVPB IF PT < 55 KG OR SERUM CREATININE > 2mg/dl;also to get Cefazolin 2 GM or wt based  All patients will receive preop Cefazolin 2 GM or wt based  CELEBREX  200 MG  ORALLY  DAY OF SURGERY  Roxicodone 10MG  ORALLY DAY OF SURGERY          OTHER ORDERS:     PHYSICIAN SIGNATURE:   19 10:28 AM   __________________________DATE:                                                                 SURGERY SCHEDULING TIMES                                                                                                                                                      [address] [place] [address] 1960                                                                           SURGERY DATE: ___/___/___                                                                      SURGERY TIME:  ___:___ AM/PM                                                                      ARRIVAL TIME:   ___:___  AM/PM                                                                                                                        **PLEASE REPORT TO THE                                                                                                 INFORMATION                                                      DESK IN THE MAIN LOBBY OF THE                                                                       HOSPITAL. [id number]  [address] [place] [address]  Wiren Board    [phone]  Phone                                                           JET INFO           PT NAME:  Siva Louis              Patient Phone:  [phone] (home) [phone] (work)                                          1960    PCP:  PCP:  Nuris Murphy                APPT DATE:  ___/___/___      DATE:  19                       DR. Kimani Gold _______     FACTOR 2 PT        H&P __________    LABS: _________                      NEEDED:  __________    EKG:   _________                     NEEDED:  __________                   JET DATE:         SURG DATE:

## 2019-04-25 NOTE — PROGRESS NOTES
This dictation was done with [employer] dictation and may contain mechanical errors related to translation. Blood pressure 122/73, pulse 71, temperature 98.6 °F (37 °C), resp. rate 16, height 5' 7\" (1.702 m), weight 215 lb (97.5 kg), last menstrual period 09/08/2012, not currently breastfeeding.

## 2019-05-01 ENCOUNTER — HOSPITAL ENCOUNTER (OUTPATIENT)
Dept: CT IMAGING | Age: 59
Discharge: HOME | End: 2019-05-01
Payer: COMMERCIAL

## 2019-05-01 DIAGNOSIS — M19.012 ARTHRITIS OF LEFT SHOULDER REGION: ICD-10-CM

## 2019-05-01 PROCEDURE — 73200 CT UPPER EXTREMITY W/O DYE: CPT

## 2019-05-05 NOTE — PROGRESS NOTES
Patient is a 23-year-old female who presents today for further evaluation treatment of left shoulder pain. This patient has had known degenerative osteoarthritis of the left shoulder has had 2 cortisone injections physical therapy and other conservative management including anti-inflammatory's. None of these conservative modalities or giving her any long-term relief and now she comes in complaining of increasing pain and loss of range of motion of the left shoulder. She has no history of injury or surgery to the left shoulder. Patient does not smoke currently is not taking any narcotics and has no history of diabetes. patient has a very positive family history of degenerative osteoarthritis but has no neck pain. Patient does have a factor to deficiency and is at increased for DVT. She complains of loss of range of motion difficulty with hand overhead activities associated with pain. Patient is status post bilateral total knee arthroplasties performed by ourselves. She's here to discuss possible shoulder arthroplasty. Patient Active Problem List   Diagnosis    HTN (hypertension)    Hypercholesteremia    Insomnia-on nightly ambien    Obesity-encouraged wt loss--will follow    Prothrombin gene sjbqxzzj-yuhperygwnaa-lfxv dr Mike Ponce if needed    Palpitations-advised to avoid caffiene    Climacteric--on zoloft for sx-(sees dr Mike Talbot pap-9/11  last mammo-1/15-dexa 7/12 --wnl    Sciatica-s/p epidural steroid injections 2010    Arthritis of left knee    S/P colonoscopy-done 2004--wnl    Hyperglycemia    Recurrent UTI-sees dr Miguel Argueta for this--urology    Rosacea--sees dr Praveen Hurley Colon polyp--9/14--dr noonan    Arthritis of left knee    H/O total knee replacement, right    Right knee pain    Arthritis of right knee    Arthritis of right knee    Arthritis of left shoulder region    Rotator cuff tendonitis, left     Prior to Visit Medications    Medication Sig Taking?  Authorizing Provider   clindamycin (CLEOCIN) 300 MG capsule 1 tablet by mouth 1 hour before and 1 hour after procedure  Joey Wallace MD   metoprolol succinate (TOPROL XL) 100 MG extended release tablet Take 100 mg by mouth daily  Historical Provider, MD   famotidine (PEPCID) 40 MG tablet Take 40 mg by mouth daily  Historical Provider, MD   rivaroxaban (XARELTO) 20 MG TABS tablet Take 1 tablet by mouth daily (with breakfast)  Foreign Mena MD   Ascorbic Acid (VITAMIN C) 1000 MG tablet Take 1,000 mg by mouth daily  Historical Provider, MD   tazarotene (TAZORAC) 0.1 % cream Apply topically nightly Apply topically nightly. Historical Provider, MD   Multiple Vitamins-Minerals ([address] 50+) CAPS Take by mouth  Historical Provider, MD   vitamin B-12 (CYANOCOBALAMIN) 250 MCG tablet Take 500 mcg by mouth daily  Historical Provider, MD   fluticasone (FLONASE) 50 MCG/ACT nasal spray USE 2 SPRAYS IN EACH NOSTRIL DAILY  Keke yL MD   lisinopril (PRINIVIL;ZESTRIL) 40 MG tablet TAKE 1 TABLET BY MOUTH DAILY  Keke Ly MD   sertraline (ZOLOFT) 100 MG tablet Take 100 mg by mouth daily. Historical Provider, MD     Physical examination 23-year-old female oriented ×3 temperature is 98.6. Patient has a BMI of 33.67. Examination of her neck shows good range of motion no specific pain tenderness or instability. Motor examination of the left upper extremity shows trapezius deltoid biceps triceps wrist extensors and flexors and hand intrinsics are intact 4-5 over 5. Sensation and perfusion are intact to the left hand and fingers. Axillary sensory and motor intact left upper extremity. There is no numbness or tingling noted in the left upper extremity. Deep tendon reflexes are 0 at the elbow and 0 to wrist of the left upper extremity. There are no other obvious cutaneous lesions lymphedema or cellulitic processes noted in the left upper extremity.   Examination of the left shoulder shows tenderness anteriorly and also

## 2019-05-09 ENCOUNTER — HOSPITAL ENCOUNTER (OUTPATIENT)
Dept: OCCUPATIONAL THERAPY | Age: 59
Setting detail: THERAPIES SERIES
Discharge: HOME | End: 2019-05-09
Payer: COMMERCIAL

## 2019-05-09 PROCEDURE — 97530 THERAPEUTIC ACTIVITIES: CPT

## 2019-05-09 PROCEDURE — 97165 OT EVAL LOW COMPLEX 30 MIN: CPT

## 2019-05-09 NOTE — PROGRESS NOTES
No     Home Living  Social/Functional History  Lives With: Spouse  Type of Home: House  Home Layout: One level  Home Access: Stairs to enter with rails  Entrance Stairs - Number of Steps: 2 MILTON from garage   Bathroom Shower/Tub: Walk-in shower  Bathroom Toilet: Handicap height  Bathroom Equipment: Shower chair, Grab bars in shower  Home Equipment: Rolling walker, Cane  ADL Assistance: Independent  Homemaking Assistance: Independent  Homemaking Responsibilities: Yes(pt completes however reports that pt's spouse is able to complete )  Ambulation Assistance: Independent  Transfer Assistance: Independent  Active : Yes  Occupation: Retired  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 Additional Comments: No recent falls. FUNCTIONAL ASSESSMENT:   1. Do you use ambulatory aids? [x] None [] Single Point Cane  [] Crutches/Walker/Wheelchair  2. What is your physical activity level?     [] Highly Active [] Active  [x] Somewhat active  [] Sedentary     Objective   ADL  Feeding: Independent  Grooming: (Difficulty with grooming hair )  UE Bathing: (only using RUE to wash hair and wash back )  LE Bathing: Independent  UE Dressing: (C/o pain with clasping bra strap )  LE Dressing: (C/o pain when pulling pants up over hips )  Toileting: (using RUE to complete and c/o pain when pulling breifs up with LUE. )  Sensation  Overall Sensation Status: WFL    Hand Dominance: Right     UE AROM (degrees) R L   Shoulder Flexion (0-180)  Shoulder Extension (0-45) 110    WFL    Shoulder abduction (0-180) 60 180   Shoulder IR (0-70)  Shoulder ER (0-90)  10  30 WFL  WFL    Elbow flexion (0-145)   Elbow extension (1450-0) [facility]     MMT (lbs) R L   Shoulder flexion 4+/5 4/5   Shoulder abduction 4+/5 N/T   Shoulder IR/ER 4+/5 N/T   Elbow flexion 4+/5 4/5                                      FILL IN THIS BOX FOR PREHAB PATIENTS ONLY              [] Home  SOCIAL RISK:            [x] Low      [] Medium     [] High EXPECTED DISCHARGE  [] Home w/ Home Health Care   FUNCTIONAL RISK:   [x] Low      [] Medium     [] High                DISPOSITION:    [x] Home w/ Outpatient PT                                                                                                                      [] SNF / Inpatient Rehab       ASSESSMENT:   Patient is a 59 y.o. female who presents for prehab for L total shoulder replacement June 12th 2019. Patient reports increased difficulty with ADLs/IADLs secondary to increased pain in LUE. Pt demonstrated decreased ROM/strength in LUE compared to RUE. Pt will have good social support from spouse s/p sx and would benefit from con't therapy when medically appropriate. Educated pt in one-handed dressing techniques, doff/donning sling to LUE, and codman exercises. Recommend pt have 24/7 assist/spv when d/c from acute care for safety. Recommended pt obtain longe handled sponge and bring objects needed by pt after surgery down to table height to prevent overuse of RUE. No further OT needed until after sx. Pt verbalized understanding of education. G-Code (if applicable):            QuickDASH Total Score: 26       QuickDASH Disability/Symptom Score : 34.09 %    Plan: Planned surgery on June 12, 2019     Therapy Time   Individual   Time In 0945   Time Out 1030   Minutes 45   Timed Code Treatment Minutes: 0 Minutes(45 min eval )     Signature: Tiffanie Lam OTR/L #[id number]

## 2019-05-13 ENCOUNTER — TELEPHONE (OUTPATIENT)
Dept: ORTHOPEDICS UNIT | Age: 59
End: 2019-05-13

## 2019-05-13 NOTE — TELEPHONE ENCOUNTER
ORTHOPAEDIC NURSE NAVIGATOR SUMMARY NOTE      Anticipated Date of Surgery: 6/12/19    Using OrthoVitals? No, Are they Registered:  NA   If No, why not? N/A    Attended Pre-Op Education Class: yes   If No, why not? PCP: Maulik Guerra   Phone #: [phone]    Date of PCP Visit for H&P:6/3/19    Any Noted Concerns from PCP prior to surgery:  No   If Yes, what concerns?:        IS THE PATIENT IN A PAIN MANAGEMENT PROGRAM?:   No         Review of Past Medical History Reveals History of:      Critical Lab Values:   Hgb/Hct:   Hemoglobin (g/dL)   Date Value   09/09/2017 11.9 (L)   /  Hematocrit (%)   Date Value   09/09/2017 35.2 (L)      HgbA1C:    Lab Results   Component Value Date    LABA1C 5.7 09/09/2017    LABA1C 5.7 08/29/2017    LABA1C 5.8 09/07/2016      Albumin:    Lab Results   Component Value Date    LABALBU 4.6 08/29/2017      BUN/Cr:   BUN (mg/dL)   Date Value   08/29/2017 19   /  CREATININE (mg/dL)   Date Value   08/29/2017 0.6      BMI:    BMI Readings from Last 1 Encounters:   04/25/19 33.67 kg/m²        Coronary Artery Disease/HTN/CHF History: Yes  htn - pcp tremaine    Cardiologist:     Cardiac Clearance Necessary: No    Date of Cardiac Clearance Appt: On Plavix? No,  If YES, when will they stop taking?     Final Cardiac Recommendations:N/A   -On any anticoagulation-aspirin 81mg       Diabetes History: No    Most Recent HgbA1C: 6.6    PCP or Endocrine Recommendations: N/A    Nutritionist/Dietician Consult Scheduled: N/A    Final Plan For Diabetic Control: N/A   Pulmonary: COPD/Emphysema/ Use of home oxygen: no     Alcohol use: wine occasionlly           DVT Risk Stratification:  Unknown      Vascular Consult Ordered:  NA    Date of Vascular Appt:     Hematology/Oncology Consult Ordered:  yes    Date of Hematology/Oncology Appt: 5/9/19    Final Recommendation For DVT Prophylaxis:   -Smoking history or use of estrogen-no         BMI Greater than 40 at time of scheduling?: No    Has Surgeon been notified of BMI concern? Not Applicable    Weight Loss Clinic Consult Ordered: Not Applicable    Date of Wt Loss Clinic Appt:     BMI at time of surgery (if went through [facility]): Additional Medical Concerns:      Sleep Apnea, anxiety    Additional Recommendations for above concerns: continue to use cpap per pulm MD. Taking medication for anxiety. Discharge Disposition Information:     Attended Pre-Hab Program: yes     Anticipated Discharge Disposition:  home w/ outpatient PT per pre-hab assessment     Who will be with patient at home following discharge?   spouse      Equipment pt already has:  none     Bedroom on first or second floor: first   Bathroom on first or second floor: first   Weight bearing status: WBAT   Pre-op ambulatory status: independent   Number of entry steps: 2 steps from garage   Caregiver assistance: spouse       Ed Wooten  5/13/2019              Electronically signed by Ed Wooten RN on 5/16/2019 at 4:19 PM     Electronically signed by Ed Wooten RN on 6/6/2019 at 3:45 PM

## 2019-05-15 ENCOUNTER — TELEPHONE (OUTPATIENT)
Dept: ORTHOPEDIC SURGERY | Age: 59
End: 2019-05-15

## 2019-05-15 DIAGNOSIS — M19.012 ARTHRITIS OF LEFT SHOULDER REGION: Primary | ICD-10-CM

## 2019-05-23 ENCOUNTER — PREP FOR PROCEDURE (OUTPATIENT)
Dept: ORTHOPEDIC SURGERY | Age: 59
End: 2019-05-23

## 2019-05-23 DIAGNOSIS — M19.012 ARTHRITIS OF LEFT SHOULDER REGION: Primary | ICD-10-CM

## 2019-05-28 ENCOUNTER — HOSPITAL ENCOUNTER (OUTPATIENT)
Dept: PREADMISSION TESTING | Age: 59
Discharge: HOME | End: 2019-06-01
Payer: COMMERCIAL

## 2019-05-28 DIAGNOSIS — M19.012 ARTHRITIS OF LEFT SHOULDER REGION: ICD-10-CM

## 2019-05-28 LAB
ABO/RH: NORMAL
ALBUMIN SERPL-MCNC: 4.6 G/DL (ref 3.4–5)
ANION GAP SERPL CALCULATED.3IONS-SCNC: 16 MMOL/L (ref 3–16)
ANTIBODY SCREEN: NORMAL
APTT: 31 SEC (ref 26–36)
BACTERIA: ABNORMAL /HPF
BASOPHILS ABSOLUTE: 0 K/UL (ref 0–0.2)
BASOPHILS RELATIVE PERCENT: 0.6 %
BILIRUBIN URINE: NEGATIVE
BLOOD, URINE: NEGATIVE
BUN BLDV-MCNC: 16 MG/DL (ref 7–20)
CALCIUM SERPL-MCNC: 9.6 MG/DL (ref 8.3–10.6)
CHLORIDE BLD-SCNC: 104 MMOL/L (ref 99–110)
CLARITY: ABNORMAL
CO2: 20 MMOL/L (ref 21–32)
COLOR: YELLOW
CREAT SERPL-MCNC: 0.7 MG/DL (ref 0.6–1.2)
EKG ATRIAL RATE: 78 BPM
EKG DIAGNOSIS: NORMAL
EKG P AXIS: 16 DEGREES
EKG P-R INTERVAL: 160 MS
EKG Q-T INTERVAL: 380 MS
EKG QRS DURATION: 84 MS
EKG QTC CALCULATION (BAZETT): 433 MS
EKG R AXIS: 5 DEGREES
EKG T AXIS: 10 DEGREES
EKG VENTRICULAR RATE: 78 BPM
EOSINOPHILS ABSOLUTE: 0.2 K/UL (ref 0–0.6)
EOSINOPHILS RELATIVE PERCENT: 3.5 %
EPITHELIAL CELLS, UA: 4 /HPF (ref 0–5)
ESTIMATED AVERAGE GLUCOSE: 142.7 MG/DL
GFR AFRICAN AMERICAN: >60
GFR NON-AFRICAN AMERICAN: >60
GLUCOSE BLD-MCNC: 162 MG/DL (ref 70–99)
GLUCOSE URINE: NEGATIVE MG/DL
HBA1C MFR BLD: 6.6 %
HCT VFR BLD CALC: 40.8 % (ref 36–48)
HEMOGLOBIN: 13.8 G/DL (ref 12–16)
HYALINE CASTS: 6 /LPF (ref 0–8)
INR BLD: 0.96 (ref 0.86–1.14)
KETONES, URINE: NEGATIVE MG/DL
LEUKOCYTE ESTERASE, URINE: NEGATIVE
LYMPHOCYTES ABSOLUTE: 1.5 K/UL (ref 1–5.1)
LYMPHOCYTES RELATIVE PERCENT: 25.1 %
MCH RBC QN AUTO: 29.9 PG (ref 26–34)
MCHC RBC AUTO-ENTMCNC: 33.8 G/DL (ref 31–36)
MCV RBC AUTO: 88.2 FL (ref 80–100)
MICROSCOPIC EXAMINATION: YES
MONOCYTES ABSOLUTE: 0.3 K/UL (ref 0–1.3)
MONOCYTES RELATIVE PERCENT: 5.8 %
MRSA SCREEN RT-PCR: NORMAL
NEUTROPHILS ABSOLUTE: 3.9 K/UL (ref 1.7–7.7)
NEUTROPHILS RELATIVE PERCENT: 65 %
NITRITE, URINE: NEGATIVE
PDW BLD-RTO: 14.3 % (ref 12.4–15.4)
PH UA: 5.5 (ref 5–8)
PLATELET # BLD: 102 K/UL (ref 135–450)
PMV BLD AUTO: 8.6 FL (ref 5–10.5)
POTASSIUM SERPL-SCNC: 4.5 MMOL/L (ref 3.5–5.1)
PREALBUMIN: 21.3 MG/DL (ref 20–40)
PROTEIN UA: NEGATIVE MG/DL
PROTHROMBIN TIME: 11 SEC (ref 9.8–13)
RBC # BLD: 4.62 M/UL (ref 4–5.2)
RBC UA: 3 /HPF (ref 0–4)
SEDIMENTATION RATE, ERYTHROCYTE: 14 MM/HR (ref 0–30)
SODIUM BLD-SCNC: 140 MMOL/L (ref 136–145)
SPECIFIC GRAVITY UA: 1.02 (ref 1–1.03)
URINE REFLEX TO CULTURE: ABNORMAL
URINE TYPE: ABNORMAL
UROBILINOGEN, URINE: 0.2 E.U./DL
VITAMIN D 25-HYDROXY: 27.4 NG/ML
WBC # BLD: 5.9 K/UL (ref 4–11)
WBC UA: 4 /HPF (ref 0–5)

## 2019-05-28 PROCEDURE — 93005 ELECTROCARDIOGRAM TRACING: CPT

## 2019-05-28 PROCEDURE — 86850 RBC ANTIBODY SCREEN: CPT

## 2019-05-28 PROCEDURE — 81001 URINALYSIS AUTO W/SCOPE: CPT

## 2019-05-28 PROCEDURE — 85730 THROMBOPLASTIN TIME PARTIAL: CPT

## 2019-05-28 PROCEDURE — 82306 VITAMIN D 25 HYDROXY: CPT

## 2019-05-28 PROCEDURE — 84134 ASSAY OF PREALBUMIN: CPT

## 2019-05-28 PROCEDURE — 85025 COMPLETE CBC W/AUTO DIFF WBC: CPT

## 2019-05-28 PROCEDURE — 93010 ELECTROCARDIOGRAM REPORT: CPT | Performed by: INTERNAL MEDICINE

## 2019-05-28 PROCEDURE — 83036 HEMOGLOBIN GLYCOSYLATED A1C: CPT

## 2019-05-28 PROCEDURE — 80048 BASIC METABOLIC PNL TOTAL CA: CPT

## 2019-05-28 PROCEDURE — 87641 MR-STAPH DNA AMP PROBE: CPT

## 2019-05-28 PROCEDURE — 85652 RBC SED RATE AUTOMATED: CPT

## 2019-05-28 PROCEDURE — 82040 ASSAY OF SERUM ALBUMIN: CPT

## 2019-05-28 PROCEDURE — 86901 BLOOD TYPING SEROLOGIC RH(D): CPT

## 2019-05-28 PROCEDURE — 85610 PROTHROMBIN TIME: CPT

## 2019-05-28 PROCEDURE — 86900 BLOOD TYPING SEROLOGIC ABO: CPT

## 2019-05-28 NOTE — PROGRESS NOTES
Pt. Attended JET class on 5/28/19. Pt. Verified surgery for Total shoulder replacement and received patient information and educational JET folder. Interviews completed by PT, OT, Case management and PAT. Labs and Tests completed as ordered/necessary. Pt. Given instructions on Pre-operative Showering techniques and the use of anti-septic 3 days before surgery. Anti-septic bottle given to patient to take home. Pt. States no further questions or concerns.

## 2019-06-04 ENCOUNTER — TELEPHONE (OUTPATIENT)
Dept: ORTHOPEDIC SURGERY | Age: 59
End: 2019-06-04

## 2019-06-04 ENCOUNTER — PREP FOR PROCEDURE (OUTPATIENT)
Dept: ORTHOPEDICS UNIT | Age: 59
End: 2019-06-04

## 2019-06-04 DIAGNOSIS — M19.012 PRIMARY OSTEOARTHRITIS OF LEFT SHOULDER: Primary | ICD-10-CM

## 2019-06-04 RX ORDER — OXYCODONE HYDROCHLORIDE AND ACETAMINOPHEN 5; 325 MG/1; MG/1
1-2 TABLET ORAL EVERY 4 HOURS PRN
Qty: 50 TABLET | Refills: 0 | Status: ON HOLD | OUTPATIENT
Start: 2019-06-04 | End: 2019-06-13 | Stop reason: HOSPADM

## 2019-06-04 RX ORDER — ASPIRIN 81 MG/1
81 TABLET ORAL 2 TIMES DAILY
Qty: 60 TABLET | Refills: 0 | Status: SHIPPED | OUTPATIENT
Start: 2019-06-04 | End: 2019-06-10

## 2019-06-04 NOTE — TELEPHONE ENCOUNTER
Rx's for surgery 6/12 were printed and now in [address].  Will be placed in chart day of surgery

## 2019-06-04 NOTE — DISCHARGE INSTR - COC
Continuity of Care Form    Patient Name: Bryant Brock   :  1960  MRN:  [id number]    Admit date:  (Not on file)  Discharge date:  ***    Code Status Order: Prior   Advance Directives:     Admitting Physician:  Katya Bauer MD  PCP: Jassi Rubio    Discharging Nurse: [facility] Unit/Room#: No information available for this encounter.   Discharging Unit Phone Number: ***    Emergency Contact:   Extended Emergency Contact Information  Primary Emergency Contact: Davin Francisco  Address: [address] Phone: [phone]  Work Phone: [phone]  Mobile Phone: [phone]  Relation: Spouse  Secondary Emergency Contact: [address] [place] [address] Phone: [phone]  Relation: Child    Past Surgical History:  Past Surgical History:   Procedure Laterality Date    ABDOMINAL HERNIA REPAIR  2011    ACHILLES TENDON SURGERY      [address]    Bilateral   [facility] &1988   [facility]    JOINT REPLACEMENT Left 2016    tkr    OTHER SURGICAL HISTORY      Left Thumb Repair [employer]    SEPTOPLASTY      Nasal Septal Repair     TENDON RELEASE      TIBIA FRACTURE SURGERY  2004    Right     TOTAL KNEE ARTHROPLASTY Right 2017    Dr nEgland Brochdena  2009       Immunization History:   Immunization History   Administered Date(s) Administered    Influenza Whole 2013    Influenza, Intradermal, Preservative free 10/28/2014    Tdap (Boostrix, Adacel) 2009    Zoster Live (Zostavax) 2014       Active Problems:  Patient Active Problem List   Diagnosis Code    HTN (hypertension) I10    Hypercholesteremia E78.00    Insomnia-on nightly ambien G47.00    Obesity-encouraged wt loss--will follow E66.9    Prothrombin gene byapfhad-pouiddvivaju-oign dr Mayela Charles if needed M42.64    Palpitations-advised to avoid caffiene R00.2    Climacteric--on zoloft for sx-(sees dr Lois Chowdhury pap-  last mammo-1/15-dexa  --wnl N95.1    Sciatica-s/p epidural steroid injections  M54.30    Arthritis of left knee M17.12    S/P colonoscopy-done --wnl Z98.890    Hyperglycemia R73.9    Recurrent UTI-sees dr Hammad Snowden for this--urology N39.0    Rosacea--sees dr Sirisha Parra L71.9    Colon polyp----dr noonan K63.5    Arthritis of left knee M17.12    H/O total knee replacement, right Z96.651    Right knee pain M25.561    Arthritis of right knee M17.11    Arthritis of right knee M17.11    Arthritis of left shoulder region M19.012    Rotator cuff tendonitis, left M75.82       Isolation/Infection:   Isolation          No Isolation            Nurse Assessment:  Last Vital Signs: LMP 2012     Last documented pain score (0-10 scale):    Last Weight:   Wt Readings from Last 1 Encounters:   19 215 lb (97.5 kg)     Mental Status:  {IP PT MENTAL STATUS:56295}    IV Access:  { ANDRES IV ACCESS:202550476}    Nursing Mobility/ADLs:  Walking   {CHP DME MARJAN:245740980}  Transfer  {CHP DME CTOZ:827211672}  Bathing  {CHP DME OSUM:686208105}  Dressing  {CHP DME IBHZ:453735686}  Toileting  {CHP DME FSLU:384083580}  Feeding  {P DME ZHQD:905071163}  Med Admin  {P DME GDP}  Med Delivery   { ANDRES MED Delivery:431909915}    Wound Care Documentation and Therapy:  Incision 16 Leg Left (Active)   Number of days: 1001       Incision 17 Leg Right (Active)   Number of days: 634        Elimination:  Continence:   · Bowel: {YES / NR:99594}  · Bladder: {YES / CV:29628}  Urinary Catheter: {Urinary Catheter:969604574}   Colostomy/Ileostomy/Ileal Conduit: {YES / ZD:15121}       Date of Last BM: ***  No intake or output data in the 24 hours ending 19 1252  No intake/output data recorded.     Safety Concerns:     Demetrio [facility] Safety Concerns:980035808}    Impairments/Disabilities: [address] [facility] Impairments/Disabilities:656089893}    Nutrition Therapy:  Current Nutrition Therapy:   [address] [facility] Diet List:017465118}    Routes of Feeding: {P DME Other Feedings:816937406}  Liquids: {Slp liquid thickness:08900}  Daily Fluid Restriction: {CHP DME Yes amt example:578541723}  Last Modified Barium Swallow with Video (Video Swallowing Test): {Done Not Done IUYY:584561276}    Treatments at the Time of Hospital Discharge:   Respiratory Treatments: ***  Oxygen Therapy:  {Therapy; copd oxygen:99056}  Ventilator:    { CC Vent BYQC:501302721}    Rehab Therapies: {THERAPEUTIC INTERVENTION:6169541690}  Weight Bearing Status/Restrictions: [facility] Weight Bearin}  Other Medical Equipment (for information only, NOT a DME order):  {EQUIPMENT:432609910}  Other Treatments: ***    Patient's personal belongings (please select all that are sent with patient):  {[facility] DME Belongings:023529776}    RN SIGNATURE:  {Esignature:964939979}    CASE MANAGEMENT/SOCIAL WORK SECTION    Inpatient Status Date: ***    Readmission Risk Assessment Score:  Readmission Risk              Risk of Unplanned Readmission:        0           Discharging to Facility/ Agency   · Name:   · Address:  · Phone:  · Fax:    Dialysis Facility (if applicable)   · Name:  · Address:  · Dialysis Schedule:  · Phone:  · Fax:    / signature: {Esignature:185863261}    PHYSICIAN SECTION    Prognosis: Good    Condition at Discharge: Stable    Rehab Potential (if transferring to Rehab): Good    Recommended Labs or Other Treatments After Discharge: none    Physician Certification: I certify the above information and transfer of Ty Dash  is necessary for the continuing treatment of the diagnosis listed and that she requires Home Care for less 30 days.      Update Admission H&P: No change in H&P    PHYSICIAN SIGNATURE:  Electronically signed by REESE Knox on 19 at 7:46 AM

## 2019-06-06 ENCOUNTER — OFFICE VISIT (OUTPATIENT)
Dept: ORTHOPEDIC SURGERY | Age: 59
End: 2019-06-06
Payer: COMMERCIAL

## 2019-06-06 VITALS
DIASTOLIC BLOOD PRESSURE: 72 MMHG | SYSTOLIC BLOOD PRESSURE: 111 MMHG | BODY MASS INDEX: 33.74 KG/M2 | HEIGHT: 67 IN | RESPIRATION RATE: 16 BRPM | TEMPERATURE: 97.8 F | WEIGHT: 215 LBS | HEART RATE: 83 BPM

## 2019-06-06 DIAGNOSIS — M19.012 PRIMARY OSTEOARTHRITIS OF LEFT SHOULDER: Primary | ICD-10-CM

## 2019-06-06 PROCEDURE — 99214 OFFICE O/P EST MOD 30 MIN: CPT | Performed by: ORTHOPAEDIC SURGERY

## 2019-06-06 RX ORDER — CELECOXIB 200 MG/1
200 CAPSULE ORAL ONCE
Status: CANCELLED | OUTPATIENT
Start: 2019-06-06 | End: 2019-06-06

## 2019-06-06 NOTE — PROGRESS NOTES
This dictation was done with [employer] dictation and may contain mechanical errors related to translation. Blood pressure 111/72, pulse 83, temperature 97.8 °F (36.6 °C), temperature source Temporal, resp. rate 16, height 5' 7\" (1.702 m), weight 215 lb (97.5 kg), last menstrual period 09/08/2012, not currently breastfeeding.

## 2019-06-09 NOTE — PROGRESS NOTES
Patient is a 61-year-old females here for preoperative discussion of left reverse total shoulder arthroplasty. She's been followed for some time with the shoulder pain with x-rays which show severe end-stage glenohumeral degenerative osteoarthritis. She is status post lower extremity arthroplasty performed in the past.  The patient complains of loss of range of motion decreased hand overhead activities with pain rating it up to 7-8 out of 10 at its worse. She has no history of injury or surgery to the left shoulder. The patient does not smoke has no metal allergies and currently is not taking any oral narcotics. There is a family history of degenerative arthritis. Patient does have some type of factor to prothrombin heterotopic diagnosis disease which causes her to have increased risk of DVT. Patient is diabetic and currently is on metformin. Patient has no history of hormone replacement medication no psychiatric dental and/or neck problems. She's here to discuss reverse total shoulder arthroplasty which is to be performed on 6/12/2019.     Patient Active Problem List   Diagnosis    HTN (hypertension)    Hypercholesteremia    Insomnia-on nightly ambien    Obesity-encouraged wt loss--will follow    Prothrombin gene djaygxmd-giqmikogakzs-ltwy dr Haven Maddox if needed    Palpitations-advised to avoid caffiene    Climacteric--on zoloft for sx-(sees dr Ariana Casillas pap-9/11  last mammo-1/15-dexa 7/12 --wnl    Sciatica-s/p epidural steroid injections 2010    Arthritis of left knee    S/P colonoscopy-done 2004--wnl    Hyperglycemia    Recurrent UTI-sees dr Rosendo Kayser for this--urology    Rosacea--sees dr Chente Roper   [facility] Colon polyp--9/14--dr noonan    Arthritis of left knee    H/O total knee replacement, right    Right knee pain    Arthritis of right knee    Arthritis of right knee    Arthritis of left shoulder region    Rotator cuff tendonitis, left     Prior to Visit Medications    Medication Sig Taking? 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 Please avoid missing doses. DARLIN Sanchez - CNP   oxyCODONE-acetaminophen (PERCOCET) 5-325 MG per tablet Take 1-2 tablets by mouth every 4 hours as needed for Pain for up to 30 days. DARLIN Lancaster - CNP   clindamycin (CLEOCIN) 300 MG capsule 1 tablet by mouth 1 hour before and 1 hour after procedure  Angelina Morales MD   metoprolol succinate (TOPROL XL) 100 MG extended release tablet Take 100 mg by mouth daily  Historical Provider, MD   famotidine (PEPCID) 40 MG tablet Take 40 mg by mouth daily  Historical Provider, MD   rivaroxaban (XARELTO) 20 MG TABS tablet Take 1 tablet by mouth daily (with breakfast)  Saumya Marin MD   Ascorbic Acid (VITAMIN C) 1000 MG tablet Take 1,000 mg by mouth daily  Historical Provider, MD   tazarotene (TAZORAC) 0.1 % cream Apply topically nightly Apply topically nightly. Historical Provider, MD   Multiple Vitamins-Minerals ([address] [facility] [address] 50+) CAPS Take by mouth  Historical Provider, MD   vitamin B-12 (CYANOCOBALAMIN) 250 MCG tablet Take 500 mcg by mouth daily  Historical Provider, MD   fluticasone (FLONASE) 50 MCG/ACT nasal spray USE 2 SPRAYS IN EACH NOSTRIL DAILY  Curtistine MD Theresa   lisinopril (PRINIVIL;ZESTRIL) 40 MG tablet TAKE 1 TABLET BY MOUTH DAILY  Jean Cardenas MD   sertraline (ZOLOFT) 100 MG tablet Take 100 mg by mouth daily. Historical Provider, MD     Physical examination 77-year-old female oriented ×3 temperature is 97.8. Examination of her neck shows good range of motion no specific pain tenderness or instability. Motor exam to the left upper extremity shows trapezius deltoid biceps triceps wrist extensors and flexors and hand intrinsics are intact 4-5 over 5. Sensation and perfusion are intact to the left upper extremity.   There is no numbness or tingling noted in the left upper extremity. Deep tendon reflexes are 0 to the elbow and 0 to wrist of the left upper extremity. Axillary sensory and motor function intact to the left upper extremity. Examination of the left shoulder shows loss of range of motion pain with crepitus with external and internal rotation noted. Decreased range of motion and definitely pain with any hand overhead activities. No signs of instability or deep sepsis are noted in the left shoulder. X-rays in the past of shown advanced degenerative osteoarthritis of the glenohumeral joint. CAT scan obtained an advanced imaging for glenoid baseplate placement shows end-stage glenohumeral osteoarthritis. Impression 43-year-old female with end-stage degenerative osteoarthritis of the left shoulder. Patient has been treated conservatively with anti-inflammatory's physical therapy and intra-articular cortisone injection. None of these of been effective and treatment and patient is ready to move towards shoulder arthroplasty. We had a 35 minute face-to-face discussion of which greater than 50% of the time was spent in counseling with this patient concerning Left reverse total shoulder arthroplasty arthroplasty it's preoperative evaluation and its postoperative pain management and follow-up. We went over the surgical procedure risks and complications including bleeding, infection,  neurovascular injury, decreased range of motion, persistent pain, instability with possible dislocation or fracture, DVT, pulmonary embolism, arm length inequality, change in mental status, and need for further surgical procedures. The patient understands this and we have answered all of their questions and concerns. We will follow up in the operating room as scheduled.

## 2019-06-10 RX ORDER — AZELASTINE 1 MG/ML
1 SPRAY, METERED NASAL 2 TIMES DAILY
COMMUNITY

## 2019-06-10 NOTE — PROGRESS NOTES
PRE-OP INSTRUCTIONS     · Do not eat or drink anything after 12:00 midnight prior to surgery. · This includes water, chewing gum, mints and ice chips. · You may brush your teeth and gargle the morning of surgery but DO  NOT SWALLOW THE WATER. Take the following medications with a small sip of water on the morning of procedure. Silver Forester Silver Forester As directed by PCP    · You may be asked to stop blood thinners such as:  Coumadin, Plavix, Fragmin and lovenox. Please check with your doctor before stopping these or any other medications. · Aspirin, ibuprofen, advil and naproxen, any anti-inflammatory products should be stopped for a week prior to your surgery. · Do not smoke and do not drink any alcoholic beverages 24 hours prior to your surgery. · Please do not wear any jewelry or body piercings on the day of surgery. · Please wear something simple, loose fitting clothing to the hospital.  Do not wear any make-up(including eye make-up) or nail polish on your fingers and toes. As part of our patient safety program to minimize surgical infections, we ask you to do the following:    Please notify your surgeon if you develop any illness between now and the day of your surgery. This includes a cough, cold, fever, sore  throat, nausea, vomiting, diarrhea, etc.   Please notify your surgeon if you experience dizziness, shortness of  breath or blurred vision between now and the time of your surgery. Please notify your surgeon of any open or redden areas that may look infected       DO NOT shave your operative site 96 hours(four days) prior to surgery. Shower the week before surgery with an antibacterial soap such as:dial,safeguard, etc.       Three(3) days prior to your surgery, cleanse the operative site with Hibiclens(anti-microbial soap).  This soap may dry your skin, please do not apply any oils or lotions     · Please bring your insurance card and picture ID day of surgery    · If you have a living will or durable power of attorny. Please bring in a copy of you advanced directives. · If you have dentures, they will be removed before going to the OR, we will provide you with a container. If you wear contact lenses/glasses, they will be removes, please bring a case    · Have you seen your family doctor for a pre-op history and physical.      · Surgery scheduler will call you 48 hours prior to your surgery to notify you of the time of your surgery and the time you will need to be at hospital...patients are asked to arrive 2 1/2 hours prior to surgery. ·  Please call Pre-Admission testing if you have any further questions.                   [id number] [facility] testing phone number:  [phone]      Thank You for choosing [facility]!!

## 2019-06-11 ENCOUNTER — ANESTHESIA EVENT (OUTPATIENT)
Dept: OPERATING ROOM | Age: 59
DRG: 483 | End: 2019-06-11
Payer: COMMERCIAL

## 2019-06-12 ENCOUNTER — HOSPITAL ENCOUNTER (INPATIENT)
Age: 59
LOS: 1 days | Discharge: HOME | DRG: 483 | End: 2019-06-13
Attending: ORTHOPAEDIC SURGERY | Admitting: ORTHOPAEDIC SURGERY
Payer: COMMERCIAL

## 2019-06-12 ENCOUNTER — APPOINTMENT (OUTPATIENT)
Dept: GENERAL RADIOLOGY | Age: 59
DRG: 483 | End: 2019-06-12
Attending: ORTHOPAEDIC SURGERY
Payer: COMMERCIAL

## 2019-06-12 ENCOUNTER — ANESTHESIA (OUTPATIENT)
Dept: OPERATING ROOM | Age: 59
DRG: 483 | End: 2019-06-12
Payer: COMMERCIAL

## 2019-06-12 VITALS
DIASTOLIC BLOOD PRESSURE: 55 MMHG | OXYGEN SATURATION: 97 % | TEMPERATURE: 98.2 F | RESPIRATION RATE: 13 BRPM | SYSTOLIC BLOOD PRESSURE: 96 MMHG

## 2019-06-12 DIAGNOSIS — M19.019 SHOULDER ARTHRITIS: ICD-10-CM

## 2019-06-12 PROBLEM — M75.22 BICEPS TENDONITIS, LEFT: Status: ACTIVE | Noted: 2019-06-12

## 2019-06-12 PROBLEM — M13.812 OTHER SPECIFIED ARTHRITIS, LEFT SHOULDER: Status: ACTIVE | Noted: 2019-06-12

## 2019-06-12 LAB
ABO/RH: NORMAL
ANTIBODY SCREEN: NORMAL
BASOPHILS ABSOLUTE: 0 K/UL (ref 0–0.2)
BASOPHILS RELATIVE PERCENT: 0.8 %
EOSINOPHILS ABSOLUTE: 0.2 K/UL (ref 0–0.6)
EOSINOPHILS RELATIVE PERCENT: 2.7 %
GLUCOSE BLD-MCNC: 117 MG/DL (ref 70–99)
GLUCOSE BLD-MCNC: 163 MG/DL (ref 70–99)
GLUCOSE BLD-MCNC: 179 MG/DL (ref 70–99)
GLUCOSE BLD-MCNC: 183 MG/DL (ref 70–99)
HCT VFR BLD CALC: 40.7 % (ref 36–48)
HEMOGLOBIN: 13.9 G/DL (ref 12–16)
LYMPHOCYTES ABSOLUTE: 1.8 K/UL (ref 1–5.1)
LYMPHOCYTES RELATIVE PERCENT: 28.7 %
MCH RBC QN AUTO: 30 PG (ref 26–34)
MCHC RBC AUTO-ENTMCNC: 34 G/DL (ref 31–36)
MCV RBC AUTO: 88.2 FL (ref 80–100)
MONOCYTES ABSOLUTE: 0.4 K/UL (ref 0–1.3)
MONOCYTES RELATIVE PERCENT: 6.8 %
NEUTROPHILS ABSOLUTE: 3.8 K/UL (ref 1.7–7.7)
NEUTROPHILS RELATIVE PERCENT: 61 %
PDW BLD-RTO: 14.6 % (ref 12.4–15.4)
PERFORMED ON: ABNORMAL
PLATELET # BLD: 119 K/UL (ref 135–450)
PMV BLD AUTO: 8 FL (ref 5–10.5)
RBC # BLD: 4.62 M/UL (ref 4–5.2)
WBC # BLD: 6.2 K/UL (ref 4–11)

## 2019-06-12 PROCEDURE — 2580000003 HC RX 258: Performed by: ORTHOPAEDIC SURGERY

## 2019-06-12 PROCEDURE — L3650 SO 8 ABD RESTRAINT PRE OTS: HCPCS | Performed by: ORTHOPAEDIC SURGERY

## 2019-06-12 PROCEDURE — 6370000000 HC RX 637 (ALT 250 FOR IP): Performed by: ORTHOPAEDIC SURGERY

## 2019-06-12 PROCEDURE — 83036 HEMOGLOBIN GLYCOSYLATED A1C: CPT

## 2019-06-12 PROCEDURE — 3600000004 HC SURGERY LEVEL 4 BASE: Performed by: ORTHOPAEDIC SURGERY

## 2019-06-12 PROCEDURE — 6360000002 HC RX W HCPCS: Performed by: ANESTHESIOLOGY

## 2019-06-12 PROCEDURE — 86901 BLOOD TYPING SEROLOGIC RH(D): CPT

## 2019-06-12 PROCEDURE — 2500000003 HC RX 250 WO HCPCS: Performed by: NURSE ANESTHETIST, CERTIFIED REGISTERED

## 2019-06-12 PROCEDURE — 97535 SELF CARE MNGMENT TRAINING: CPT

## 2019-06-12 PROCEDURE — 85025 COMPLETE CBC W/AUTO DIFF WBC: CPT

## 2019-06-12 PROCEDURE — 88304 TISSUE EXAM BY PATHOLOGIST: CPT

## 2019-06-12 PROCEDURE — 3600000014 HC SURGERY LEVEL 4 ADDTL 15MIN: Performed by: ORTHOPAEDIC SURGERY

## 2019-06-12 PROCEDURE — 6360000002 HC RX W HCPCS: Performed by: ORTHOPAEDIC SURGERY

## 2019-06-12 PROCEDURE — 3700000000 HC ANESTHESIA ATTENDED CARE: Performed by: ORTHOPAEDIC SURGERY

## 2019-06-12 PROCEDURE — 3700000001 HC ADD 15 MINUTES (ANESTHESIA): Performed by: ORTHOPAEDIC SURGERY

## 2019-06-12 PROCEDURE — 86850 RBC ANTIBODY SCREEN: CPT

## 2019-06-12 PROCEDURE — 0LS40ZZ REPOSITION LEFT UPPER ARM TENDON, OPEN APPROACH: ICD-10-PCS | Performed by: ORTHOPAEDIC SURGERY

## 2019-06-12 PROCEDURE — 86900 BLOOD TYPING SEROLOGIC ABO: CPT

## 2019-06-12 PROCEDURE — 0RRK00Z REPLACEMENT OF LEFT SHOULDER JOINT WITH REVERSE BALL AND SOCKET SYNTHETIC SUBSTITUTE, OPEN APPROACH: ICD-10-PCS | Performed by: ORTHOPAEDIC SURGERY

## 2019-06-12 PROCEDURE — 2709999900 HC NON-CHARGEABLE SUPPLY: Performed by: ORTHOPAEDIC SURGERY

## 2019-06-12 PROCEDURE — 1200000000 HC SEMI PRIVATE

## 2019-06-12 PROCEDURE — 2500000003 HC RX 250 WO HCPCS: Performed by: ORTHOPAEDIC SURGERY

## 2019-06-12 PROCEDURE — 36415 COLL VENOUS BLD VENIPUNCTURE: CPT

## 2019-06-12 PROCEDURE — 2580000003 HC RX 258: Performed by: ANESTHESIOLOGY

## 2019-06-12 PROCEDURE — 7100000000 HC PACU RECOVERY - FIRST 15 MIN: Performed by: ORTHOPAEDIC SURGERY

## 2019-06-12 PROCEDURE — C1776 JOINT DEVICE (IMPLANTABLE): HCPCS | Performed by: ORTHOPAEDIC SURGERY

## 2019-06-12 PROCEDURE — 3209999900 FLUORO FOR SURGICAL PROCEDURES

## 2019-06-12 PROCEDURE — 97166 OT EVAL MOD COMPLEX 45 MIN: CPT

## 2019-06-12 PROCEDURE — 88311 DECALCIFY TISSUE: CPT

## 2019-06-12 PROCEDURE — 73020 X-RAY EXAM OF SHOULDER: CPT

## 2019-06-12 PROCEDURE — 6360000002 HC RX W HCPCS: Performed by: NURSE ANESTHETIST, CERTIFIED REGISTERED

## 2019-06-12 PROCEDURE — L3660 SO 8 AB RSTR CAN/WEB PRE OTS: HCPCS | Performed by: ORTHOPAEDIC SURGERY

## 2019-06-12 PROCEDURE — 7100000001 HC PACU RECOVERY - ADDTL 15 MIN: Performed by: ORTHOPAEDIC SURGERY

## 2019-06-12 DEVICE — SCREW BONE L26MM DIA5MM TI ST FULL THRD PERIPH FOR GLEN: Type: IMPLANTABLE DEVICE | Site: SHOULDER | Status: FUNCTIONAL

## 2019-06-12 DEVICE — IMPLANTABLE DEVICE: Type: IMPLANTABLE DEVICE | Site: SHOULDER | Status: FUNCTIONAL

## 2019-06-12 DEVICE — SPHERE GLEN DIA36MM STD REVERSED AEQUALIS PERFORM: Type: IMPLANTABLE DEVICE | Site: SHOULDER | Status: FUNCTIONAL

## 2019-06-12 DEVICE — SCREW BONE L30MM DIA5MM TI ST FULL THRD PERIPH FOR GLEN: Type: IMPLANTABLE DEVICE | Site: SHOULDER | Status: FUNCTIONAL

## 2019-06-12 DEVICE — SCREW BONE L40MM DIA6.5MM CTRL GLEN REVERSED AEQUALIS: Type: IMPLANTABLE DEVICE | Site: SHOULDER | Status: FUNCTIONAL

## 2019-06-12 DEVICE — TRAY HUM THK+0MM 3.5MM OFFSET SHLDR HI REVERSED AEQUALIS: Type: IMPLANTABLE DEVICE | Site: SHOULDER | Status: FUNCTIONAL

## 2019-06-12 DEVICE — BASEPLATE GLEN DIA25MM STD REVERSED AEQUALIS PERFORM: Type: IMPLANTABLE DEVICE | Site: SHOULDER | Status: FUNCTIONAL

## 2019-06-12 DEVICE — INSERT HUM DIA36MM THK+9MM B-12.5DEG SHLDR REVERSED: Type: IMPLANTABLE DEVICE | Site: SHOULDER | Status: FUNCTIONAL

## 2019-06-12 RX ORDER — MIDAZOLAM HYDROCHLORIDE 1 MG/ML
INJECTION INTRAMUSCULAR; INTRAVENOUS PRN
Status: DISCONTINUED | OUTPATIENT
Start: 2019-06-12 | End: 2019-06-12 | Stop reason: SDUPTHER

## 2019-06-12 RX ORDER — ONDANSETRON 2 MG/ML
INJECTION INTRAMUSCULAR; INTRAVENOUS PRN
Status: DISCONTINUED | OUTPATIENT
Start: 2019-06-12 | End: 2019-06-12 | Stop reason: SDUPTHER

## 2019-06-12 RX ORDER — AZELASTINE 1 MG/ML
1 SPRAY, METERED NASAL 2 TIMES DAILY
Status: DISCONTINUED | OUTPATIENT
Start: 2019-06-12 | End: 2019-06-12

## 2019-06-12 RX ORDER — DIPHENHYDRAMINE HCL 25 MG
25 TABLET ORAL EVERY 6 HOURS PRN
Status: CANCELLED | OUTPATIENT
Start: 2019-06-12

## 2019-06-12 RX ORDER — FAMOTIDINE 20 MG/1
40 TABLET, FILM COATED ORAL NIGHTLY PRN
Status: DISCONTINUED | OUTPATIENT
Start: 2019-06-12 | End: 2019-06-12 | Stop reason: ALTCHOICE

## 2019-06-12 RX ORDER — LABETALOL HYDROCHLORIDE 5 MG/ML
INJECTION, SOLUTION INTRAVENOUS PRN
Status: DISCONTINUED | OUTPATIENT
Start: 2019-06-12 | End: 2019-06-12 | Stop reason: SDUPTHER

## 2019-06-12 RX ORDER — SODIUM CHLORIDE 0.9 % (FLUSH) 0.9 %
10 SYRINGE (ML) INJECTION PRN
Status: DISCONTINUED | OUTPATIENT
Start: 2019-06-12 | End: 2019-06-13 | Stop reason: HOSPADM

## 2019-06-12 RX ORDER — METOPROLOL SUCCINATE 50 MG/1
100 TABLET, EXTENDED RELEASE ORAL DAILY
Status: DISCONTINUED | OUTPATIENT
Start: 2019-06-13 | End: 2019-06-13 | Stop reason: HOSPADM

## 2019-06-12 RX ORDER — FENTANYL CITRATE 50 UG/ML
INJECTION, SOLUTION INTRAMUSCULAR; INTRAVENOUS PRN
Status: DISCONTINUED | OUTPATIENT
Start: 2019-06-12 | End: 2019-06-12 | Stop reason: SDUPTHER

## 2019-06-12 RX ORDER — LIDOCAINE HYDROCHLORIDE 20 MG/ML
INJECTION, SOLUTION EPIDURAL; INFILTRATION; INTRACAUDAL; PERINEURAL PRN
Status: DISCONTINUED | OUTPATIENT
Start: 2019-06-12 | End: 2019-06-12 | Stop reason: SDUPTHER

## 2019-06-12 RX ORDER — LISINOPRIL 40 MG/1
40 TABLET ORAL DAILY
Status: DISCONTINUED | OUTPATIENT
Start: 2019-06-13 | End: 2019-06-13 | Stop reason: HOSPADM

## 2019-06-12 RX ORDER — SODIUM CHLORIDE 0.9 % (FLUSH) 0.9 %
10 SYRINGE (ML) INJECTION EVERY 12 HOURS SCHEDULED
Status: DISCONTINUED | OUTPATIENT
Start: 2019-06-12 | End: 2019-06-13 | Stop reason: HOSPADM

## 2019-06-12 RX ORDER — ONDANSETRON 2 MG/ML
4 INJECTION INTRAMUSCULAR; INTRAVENOUS
Status: DISCONTINUED | OUTPATIENT
Start: 2019-06-12 | End: 2019-06-12 | Stop reason: HOSPADM

## 2019-06-12 RX ORDER — DEXAMETHASONE SODIUM PHOSPHATE 4 MG/ML
INJECTION, SOLUTION INTRA-ARTICULAR; INTRALESIONAL; INTRAMUSCULAR; INTRAVENOUS; SOFT TISSUE PRN
Status: DISCONTINUED | OUTPATIENT
Start: 2019-06-12 | End: 2019-06-12 | Stop reason: SDUPTHER

## 2019-06-12 RX ORDER — DEXTROSE MONOHYDRATE 25 G/50ML
12.5 INJECTION, SOLUTION INTRAVENOUS PRN
Status: DISCONTINUED | OUTPATIENT
Start: 2019-06-12 | End: 2019-06-13 | Stop reason: HOSPADM

## 2019-06-12 RX ORDER — NICOTINE POLACRILEX 4 MG
15 LOZENGE BUCCAL PRN
Status: DISCONTINUED | OUTPATIENT
Start: 2019-06-12 | End: 2019-06-13 | Stop reason: HOSPADM

## 2019-06-12 RX ORDER — SERTRALINE HYDROCHLORIDE 100 MG/1
100 TABLET, FILM COATED ORAL NIGHTLY
Status: DISCONTINUED | OUTPATIENT
Start: 2019-06-12 | End: 2019-06-13 | Stop reason: HOSPADM

## 2019-06-12 RX ORDER — SODIUM CHLORIDE 0.9 % (FLUSH) 0.9 %
10 SYRINGE (ML) INJECTION PRN
Status: DISCONTINUED | OUTPATIENT
Start: 2019-06-12 | End: 2019-06-12 | Stop reason: HOSPADM

## 2019-06-12 RX ORDER — MORPHINE SULFATE 2 MG/ML
1 INJECTION, SOLUTION INTRAMUSCULAR; INTRAVENOUS EVERY 5 MIN PRN
Status: DISCONTINUED | OUTPATIENT
Start: 2019-06-12 | End: 2019-06-12 | Stop reason: HOSPADM

## 2019-06-12 RX ORDER — KETAMINE HCL IN NACL, ISO-OSM 100MG/10ML
SYRINGE (ML) INJECTION PRN
Status: DISCONTINUED | OUTPATIENT
Start: 2019-06-12 | End: 2019-06-12 | Stop reason: SDUPTHER

## 2019-06-12 RX ORDER — SODIUM CHLORIDE 9 MG/ML
INJECTION, SOLUTION INTRAVENOUS CONTINUOUS
Status: DISCONTINUED | OUTPATIENT
Start: 2019-06-12 | End: 2019-06-12

## 2019-06-12 RX ORDER — INSULIN GLARGINE 100 [IU]/ML
0.25 INJECTION, SOLUTION SUBCUTANEOUS NIGHTLY
Status: DISCONTINUED | OUTPATIENT
Start: 2019-06-12 | End: 2019-06-13 | Stop reason: HOSPADM

## 2019-06-12 RX ORDER — ROCURONIUM BROMIDE 10 MG/ML
INJECTION, SOLUTION INTRAVENOUS PRN
Status: DISCONTINUED | OUTPATIENT
Start: 2019-06-12 | End: 2019-06-12 | Stop reason: SDUPTHER

## 2019-06-12 RX ORDER — FENTANYL CITRATE 50 UG/ML
50 INJECTION, SOLUTION INTRAMUSCULAR; INTRAVENOUS EVERY 5 MIN PRN
Status: COMPLETED | OUTPATIENT
Start: 2019-06-12 | End: 2019-06-12

## 2019-06-12 RX ORDER — DOCUSATE SODIUM 100 MG/1
100 CAPSULE, LIQUID FILLED ORAL 2 TIMES DAILY
Status: DISCONTINUED | OUTPATIENT
Start: 2019-06-12 | End: 2019-06-13 | Stop reason: HOSPADM

## 2019-06-12 RX ORDER — MORPHINE SULFATE 2 MG/ML
2 INJECTION, SOLUTION INTRAMUSCULAR; INTRAVENOUS
Status: DISCONTINUED | OUTPATIENT
Start: 2019-06-12 | End: 2019-06-13 | Stop reason: HOSPADM

## 2019-06-12 RX ORDER — SODIUM CHLORIDE 450 MG/100ML
INJECTION, SOLUTION INTRAVENOUS CONTINUOUS
Status: DISCONTINUED | OUTPATIENT
Start: 2019-06-12 | End: 2019-06-13 | Stop reason: HOSPADM

## 2019-06-12 RX ORDER — CELECOXIB 200 MG/1
200 CAPSULE ORAL ONCE
Status: COMPLETED | OUTPATIENT
Start: 2019-06-12 | End: 2019-06-12

## 2019-06-12 RX ORDER — SUCCINYLCHOLINE/SOD CL,ISO/PF 200MG/10ML
SYRINGE (ML) INTRAVENOUS PRN
Status: DISCONTINUED | OUTPATIENT
Start: 2019-06-12 | End: 2019-06-12 | Stop reason: SDUPTHER

## 2019-06-12 RX ORDER — ONDANSETRON 2 MG/ML
4 INJECTION INTRAMUSCULAR; INTRAVENOUS EVERY 6 HOURS PRN
Status: DISCONTINUED | OUTPATIENT
Start: 2019-06-12 | End: 2019-06-13 | Stop reason: HOSPADM

## 2019-06-12 RX ORDER — PROPOFOL 10 MG/ML
INJECTION, EMULSION INTRAVENOUS PRN
Status: DISCONTINUED | OUTPATIENT
Start: 2019-06-12 | End: 2019-06-12 | Stop reason: SDUPTHER

## 2019-06-12 RX ORDER — MEPERIDINE HYDROCHLORIDE 25 MG/ML
12.5 INJECTION INTRAMUSCULAR; INTRAVENOUS; SUBCUTANEOUS EVERY 5 MIN PRN
Status: DISCONTINUED | OUTPATIENT
Start: 2019-06-12 | End: 2019-06-12 | Stop reason: HOSPADM

## 2019-06-12 RX ORDER — SODIUM CHLORIDE 0.9 % (FLUSH) 0.9 %
10 SYRINGE (ML) INJECTION EVERY 12 HOURS SCHEDULED
Status: DISCONTINUED | OUTPATIENT
Start: 2019-06-12 | End: 2019-06-12 | Stop reason: HOSPADM

## 2019-06-12 RX ORDER — MORPHINE SULFATE 2 MG/ML
2 INJECTION, SOLUTION INTRAMUSCULAR; INTRAVENOUS EVERY 5 MIN PRN
Status: DISCONTINUED | OUTPATIENT
Start: 2019-06-12 | End: 2019-06-12 | Stop reason: HOSPADM

## 2019-06-12 RX ORDER — HYDROMORPHONE HYDROCHLORIDE 2 MG/1
1 TABLET ORAL EVERY 4 HOURS PRN
Status: DISCONTINUED | OUTPATIENT
Start: 2019-06-12 | End: 2019-06-13 | Stop reason: HOSPADM

## 2019-06-12 RX ORDER — DEXTROSE MONOHYDRATE 50 MG/ML
100 INJECTION, SOLUTION INTRAVENOUS PRN
Status: DISCONTINUED | OUTPATIENT
Start: 2019-06-12 | End: 2019-06-13 | Stop reason: HOSPADM

## 2019-06-12 RX ORDER — FENTANYL CITRATE 50 UG/ML
25 INJECTION, SOLUTION INTRAMUSCULAR; INTRAVENOUS EVERY 5 MIN PRN
Status: DISCONTINUED | OUTPATIENT
Start: 2019-06-12 | End: 2019-06-12 | Stop reason: HOSPADM

## 2019-06-12 RX ADMIN — MORPHINE SULFATE 2 MG: 2 INJECTION, SOLUTION INTRAMUSCULAR; INTRAVENOUS at 11:05

## 2019-06-12 RX ADMIN — SODIUM CHLORIDE: 9 INJECTION, SOLUTION INTRAVENOUS at 07:52

## 2019-06-12 RX ADMIN — HYDROMORPHONE HYDROCHLORIDE 1 MG: 2 TABLET ORAL at 13:50

## 2019-06-12 RX ADMIN — Medication 30 MG: at 08:50

## 2019-06-12 RX ADMIN — FENTANYL CITRATE 50 MCG: 50 INJECTION INTRAMUSCULAR; INTRAVENOUS at 09:50

## 2019-06-12 RX ADMIN — Medication 2 G: at 16:18

## 2019-06-12 RX ADMIN — ROCURONIUM BROMIDE 50 MG: 10 INJECTION INTRAVENOUS at 08:36

## 2019-06-12 RX ADMIN — INSULIN LISPRO 1 UNITS: 100 INJECTION, SOLUTION INTRAVENOUS; SUBCUTANEOUS at 22:57

## 2019-06-12 RX ADMIN — FENTANYL CITRATE 50 MCG: 50 INJECTION INTRAMUSCULAR; INTRAVENOUS at 08:23

## 2019-06-12 RX ADMIN — HYDROMORPHONE HYDROCHLORIDE 0.5 MG: 1 INJECTION, SOLUTION INTRAMUSCULAR; INTRAVENOUS; SUBCUTANEOUS at 11:34

## 2019-06-12 RX ADMIN — FENTANYL CITRATE 50 MCG: 50 INJECTION INTRAMUSCULAR; INTRAVENOUS at 10:39

## 2019-06-12 RX ADMIN — DEXAMETHASONE SODIUM PHOSPHATE 8 MG: 4 INJECTION, SOLUTION INTRAMUSCULAR; INTRAVENOUS at 08:56

## 2019-06-12 RX ADMIN — PROPOFOL 200 MG: 10 INJECTION, EMULSION INTRAVENOUS at 08:29

## 2019-06-12 RX ADMIN — FENTANYL CITRATE 50 MCG: 50 INJECTION INTRAMUSCULAR; INTRAVENOUS at 10:47

## 2019-06-12 RX ADMIN — Medication 2 G: at 23:30

## 2019-06-12 RX ADMIN — SERTRALINE 100 MG: 100 TABLET, FILM COATED ORAL at 21:20

## 2019-06-12 RX ADMIN — LIDOCAINE HYDROCHLORIDE 3 ML: 20 INJECTION, SOLUTION EPIDURAL; INFILTRATION; INTRACAUDAL; PERINEURAL at 08:29

## 2019-06-12 RX ADMIN — INSULIN LISPRO 8 UNITS: 100 INJECTION, SOLUTION INTRAVENOUS; SUBCUTANEOUS at 17:58

## 2019-06-12 RX ADMIN — HYDROMORPHONE HYDROCHLORIDE 1 MG: 2 TABLET ORAL at 22:29

## 2019-06-12 RX ADMIN — CELECOXIB 200 MG: 200 CAPSULE ORAL at 07:52

## 2019-06-12 RX ADMIN — FAMOTIDINE 20 MG: 10 INJECTION, SOLUTION INTRAVENOUS at 21:20

## 2019-06-12 RX ADMIN — ASPIRIN 325 MG: 325 TABLET, DELAYED RELEASE ORAL at 21:20

## 2019-06-12 RX ADMIN — INSULIN GLARGINE 25 UNITS: 100 INJECTION, SOLUTION SUBCUTANEOUS at 22:31

## 2019-06-12 RX ADMIN — Medication 100 MG: at 08:29

## 2019-06-12 RX ADMIN — SUGAMMADEX 200 MG: 100 INJECTION, SOLUTION INTRAVENOUS at 09:45

## 2019-06-12 RX ADMIN — DOCUSATE SODIUM 100 MG: 100 CAPSULE, LIQUID FILLED ORAL at 21:20

## 2019-06-12 RX ADMIN — ONDANSETRON 4 MG: 2 INJECTION INTRAMUSCULAR; INTRAVENOUS at 09:33

## 2019-06-12 RX ADMIN — FENTANYL CITRATE 50 MCG: 50 INJECTION INTRAMUSCULAR; INTRAVENOUS at 10:54

## 2019-06-12 RX ADMIN — INSULIN LISPRO 1 UNITS: 100 INJECTION, SOLUTION INTRAVENOUS; SUBCUTANEOUS at 17:54

## 2019-06-12 RX ADMIN — SODIUM CHLORIDE: 4.5 INJECTION, SOLUTION INTRAVENOUS at 13:51

## 2019-06-12 RX ADMIN — Medication 2 G: at 08:22

## 2019-06-12 RX ADMIN — FENTANYL CITRATE 50 MCG: 50 INJECTION INTRAMUSCULAR; INTRAVENOUS at 10:26

## 2019-06-12 RX ADMIN — LABETALOL HYDROCHLORIDE 5 MG: 5 INJECTION, SOLUTION INTRAVENOUS at 09:03

## 2019-06-12 RX ADMIN — HYDROMORPHONE HYDROCHLORIDE 1 MG: 2 TABLET ORAL at 17:54

## 2019-06-12 RX ADMIN — Medication 10 ML: at 21:21

## 2019-06-12 RX ADMIN — MIDAZOLAM 2 MG: 1 INJECTION INTRAMUSCULAR; INTRAVENOUS at 08:23

## 2019-06-12 ASSESSMENT — PAIN DESCRIPTION - LOCATION
LOCATION: SHOULDER

## 2019-06-12 ASSESSMENT — PULMONARY FUNCTION TESTS
PIF_VALUE: 12
PIF_VALUE: 20
PIF_VALUE: 1
PIF_VALUE: 18
PIF_VALUE: 22
PIF_VALUE: 20
PIF_VALUE: 1
PIF_VALUE: 17
PIF_VALUE: 1
PIF_VALUE: 21
PIF_VALUE: 0
PIF_VALUE: 23
PIF_VALUE: 20
PIF_VALUE: 21
PIF_VALUE: 21
PIF_VALUE: 17
PIF_VALUE: 21
PIF_VALUE: 0
PIF_VALUE: 22
PIF_VALUE: 21
PIF_VALUE: 20
PIF_VALUE: 20
PIF_VALUE: 2
PIF_VALUE: 21
PIF_VALUE: 23
PIF_VALUE: 21
PIF_VALUE: 20
PIF_VALUE: 17
PIF_VALUE: 20
PIF_VALUE: 0
PIF_VALUE: 21
PIF_VALUE: 20
PIF_VALUE: 8
PIF_VALUE: 20
PIF_VALUE: 1
PIF_VALUE: 21
PIF_VALUE: 15
PIF_VALUE: 21
PIF_VALUE: 22
PIF_VALUE: 15
PIF_VALUE: 15
PIF_VALUE: 21
PIF_VALUE: 17
PIF_VALUE: 21
PIF_VALUE: 24
PIF_VALUE: 19
PIF_VALUE: 20
PIF_VALUE: 20
PIF_VALUE: 21
PIF_VALUE: 18
PIF_VALUE: 15
PIF_VALUE: 20
PIF_VALUE: 21
PIF_VALUE: 8
PIF_VALUE: 21
PIF_VALUE: 15
PIF_VALUE: 2
PIF_VALUE: 19
PIF_VALUE: 0
PIF_VALUE: 20
PIF_VALUE: 7
PIF_VALUE: 23
PIF_VALUE: 22
PIF_VALUE: 29
PIF_VALUE: 18
PIF_VALUE: 8
PIF_VALUE: 1
PIF_VALUE: 22
PIF_VALUE: 21
PIF_VALUE: 1
PIF_VALUE: 21
PIF_VALUE: 2
PIF_VALUE: 1
PIF_VALUE: 20
PIF_VALUE: 1
PIF_VALUE: 4
PIF_VALUE: 3
PIF_VALUE: 21
PIF_VALUE: 20
PIF_VALUE: 18
PIF_VALUE: 21
PIF_VALUE: 8
PIF_VALUE: 19
PIF_VALUE: 20
PIF_VALUE: 3
PIF_VALUE: 15
PIF_VALUE: 21
PIF_VALUE: 18
PIF_VALUE: 17
PIF_VALUE: 15
PIF_VALUE: 19
PIF_VALUE: 21
PIF_VALUE: 1
PIF_VALUE: 1
PIF_VALUE: 18
PIF_VALUE: 17
PIF_VALUE: 18
PIF_VALUE: 25
PIF_VALUE: 21
PIF_VALUE: 17
PIF_VALUE: 21
PIF_VALUE: 20
PIF_VALUE: 0
PIF_VALUE: 21

## 2019-06-12 ASSESSMENT — PAIN DESCRIPTION - DESCRIPTORS
DESCRIPTORS: DISCOMFORT
DESCRIPTORS: SHARP
DESCRIPTORS: DISCOMFORT
DESCRIPTORS: DISCOMFORT
DESCRIPTORS: SHARP
DESCRIPTORS: DISCOMFORT
DESCRIPTORS: DISCOMFORT
DESCRIPTORS: SQUEEZING
DESCRIPTORS: SQUEEZING

## 2019-06-12 ASSESSMENT — PAIN DESCRIPTION - PAIN TYPE
TYPE: SURGICAL PAIN

## 2019-06-12 ASSESSMENT — PAIN SCALES - GENERAL
PAINLEVEL_OUTOF10: 10
PAINLEVEL_OUTOF10: 0
PAINLEVEL_OUTOF10: 0
PAINLEVEL_OUTOF10: 6
PAINLEVEL_OUTOF10: 8
PAINLEVEL_OUTOF10: 3
PAINLEVEL_OUTOF10: 5
PAINLEVEL_OUTOF10: 10
PAINLEVEL_OUTOF10: 5
PAINLEVEL_OUTOF10: 9
PAINLEVEL_OUTOF10: 10
PAINLEVEL_OUTOF10: 5
PAINLEVEL_OUTOF10: 6
PAINLEVEL_OUTOF10: 8
PAINLEVEL_OUTOF10: 5
PAINLEVEL_OUTOF10: 0

## 2019-06-12 ASSESSMENT — PAIN DESCRIPTION - ONSET
ONSET: ON-GOING
ONSET: ON-GOING
ONSET: AWAKENED FROM SLEEP
ONSET: ON-GOING

## 2019-06-12 ASSESSMENT — PAIN DESCRIPTION - PROGRESSION
CLINICAL_PROGRESSION: GRADUALLY IMPROVING
CLINICAL_PROGRESSION: NOT CHANGED
CLINICAL_PROGRESSION: GRADUALLY WORSENING
CLINICAL_PROGRESSION: GRADUALLY IMPROVING
CLINICAL_PROGRESSION: GRADUALLY IMPROVING
CLINICAL_PROGRESSION: NOT CHANGED
CLINICAL_PROGRESSION: GRADUALLY WORSENING
CLINICAL_PROGRESSION: NOT CHANGED
CLINICAL_PROGRESSION: GRADUALLY IMPROVING

## 2019-06-12 ASSESSMENT — PAIN DESCRIPTION - FREQUENCY
FREQUENCY: CONTINUOUS
FREQUENCY: INTERMITTENT

## 2019-06-12 ASSESSMENT — PAIN DESCRIPTION - ORIENTATION
ORIENTATION: LEFT

## 2019-06-12 ASSESSMENT — PAIN - FUNCTIONAL ASSESSMENT
PAIN_FUNCTIONAL_ASSESSMENT: PREVENTS OR INTERFERES WITH MANY ACTIVE NOT PASSIVE ACTIVITIES
PAIN_FUNCTIONAL_ASSESSMENT: PREVENTS OR INTERFERES SOME ACTIVE ACTIVITIES AND ADLS
PAIN_FUNCTIONAL_ASSESSMENT: PREVENTS OR INTERFERES WITH MANY ACTIVE NOT PASSIVE ACTIVITIES
PAIN_FUNCTIONAL_ASSESSMENT: PREVENTS OR INTERFERES SOME ACTIVE ACTIVITIES AND ADLS
PAIN_FUNCTIONAL_ASSESSMENT: PREVENTS OR INTERFERES WITH MANY ACTIVE NOT PASSIVE ACTIVITIES
PAIN_FUNCTIONAL_ASSESSMENT: PREVENTS OR INTERFERES SOME ACTIVE ACTIVITIES AND ADLS
PAIN_FUNCTIONAL_ASSESSMENT: PREVENTS OR INTERFERES WITH MANY ACTIVE NOT PASSIVE ACTIVITIES
PAIN_FUNCTIONAL_ASSESSMENT: 0-10
PAIN_FUNCTIONAL_ASSESSMENT: PREVENTS OR INTERFERES SOME ACTIVE ACTIVITIES AND ADLS
PAIN_FUNCTIONAL_ASSESSMENT: PREVENTS OR INTERFERES SOME ACTIVE ACTIVITIES AND ADLS
PAIN_FUNCTIONAL_ASSESSMENT: PREVENTS OR INTERFERES WITH MANY ACTIVE NOT PASSIVE ACTIVITIES

## 2019-06-12 NOTE — PROGRESS NOTES
Patient continues to C/O left shoulder pain at 8 of 10. Dr Orquidea Alejandro called and new order noted. Patient resting in bed . Repositioned for comfort. VSS. IV patent. HOB up.

## 2019-06-12 NOTE — PROGRESS NOTES
Patient admitted to PACU from OR. Patient asleep. Resp easy unlabored on 4L NC with SAO2 88%. Monitor in SR. Left shoulder dressing dry and intact with ice pack on and sling intact. Vascular checks stable bilateral extremities. VSS. IV patent to right forearm.

## 2019-06-12 NOTE — OP NOTE
[facility]           [address], [address]                                OPERATIVE REPORT    PATIENT NAME: Glynn Reynoso                  :        1960  MED REC NO:   [id number]                          ROOM:       3108  ACCOUNT NO:   [de-identified]                           ADMIT DATE: 2019  PROVIDER:     Samara Moses MD    DATE OF PROCEDURE:  2019    PREOPERATIVE DIAGNOSES:  Left shoulder degenerative arthritis with  biceps tendinitis. POSTOPERATIVE DIAGNOSES:  Left shoulder degenerative arthritis with  biceps tendinitis. OPERATION PERFORMED:  Left reverse total shoulder arthroplasty with  biceps tenodesis. SURGEON:  Samara Moses MD    ASSISTANT:  REESE Bruno    INDICATIONS:  The patient is a 49-year-old female who is status post  bilateral total knee replacements in the past.  She presented initially  with shoulder pain and x-rays at that time showed end-stage degenerative  osteoarthritis of the left glenohumeral joint along with subluxation of  the humeral head. She was treated conservatively for many months with  anti-inflammatories, intra-articular cortisone injections, and physical  therapy, none of these gave her any significant relief. So, she comes  for shoulder arthroplasty and biceps tenodesis. OPERATIVE PROCEDURE:  The patient was brought to the operating room. Once anesthetic was obtained and intravenous antibiotics delivered, her  shoulder and arm were prepped and draped in a sterile fashion. An  anterior incision was made. Skin and subcutaneous tissue were divided  down to exploit the deltopectoral groove. Once this was identified, the  deltoid and the cephalic vein were retracted laterally. Once this was  done, then this exposed the deeper structure, the superior portion of  the biceps tendon just before I entered into the biceps groove was  identified and tagged.   It was cut from this point and tagging it later  for tenodesis. The subscapularis was identified and peeled medially to expose the  highly arthritic humeral head. Once the subscapularis was pulled back  enough, the glenohumeral ligaments were released. The shoulder was  easily dislocated. Proximal humeral osteotomy was performed at 20  degrees in retroversion to the humeral shaft. The humeral head was then  removed. The proximal humerus then was rasped to accept a #3 long flex  stem. Once this was done, then the rasp was kept in the humeral shaft  and then covered with a plate. The glenoid then was exposed. A 360-degree resection of glenohumeral and labral ligaments was  performed. Dissection was carried down the anterior aspect of the  glenoid neck and also inferiorly to the origin of the long head of the  triceps. Once this was exposed, it was obvious that this would be able  to take a standard flat plate, nonaugmented glenoid baseplate. Using the [employer] computer navigation assistance, the center of the  glenoid vault was identified and prepared to accept a 25-mm baseplate. This was then secured into the glenoid using a 40-mm screw. C-arm was  brought in to check not only the position of the baseplate, but also  position of not only the center 40-mm screw, but the two peripheral  screws, which were 30 and 26, superior and inferior respectively. There  was excellent fit and good overall radiographic appearance. The 36-mm Glenosphere then was placed over the Randall Lemont taper and screwed  into place. At this point, then the proximal humerus was re-exposed and  a high offset baseplate was placed along with the +9 mm poly. This was  reduced and taken through a stable range of motion. With great  difficulty, it was dislocated and the shoulder was brought back up into  the field and the entire trial rasp was removed.   It was then built on  the back table with a #3 long flex stem and also the high offset  baseplate. This was hammered into place in the proximal humerus at 20  degrees of retroversion. The +9, 36-mm poly was snapped into the tray  and the shoulder was reduced. It was taken through a very stable range  of motion. C-arm then was brought in to review the implant. There was  good placement of all the hardware and no obvious fractures. The wound then was irrigated out. Hemostasis was obtained. The wound  was injected with 100 mL of ropivacaine, morphine, cefuroxime, and  methylprednisolone. It was also bathed with tranexamic acid and also  iodine. The biceps tendon, which had previously been isolated, was then  sutured into the bone and also surrounding structures with multiple  nonabsorbable sutures. This was anchored securely, finishing the biceps  tenodesis. The wound was closed in layers. The patient was placed in a  sling and she was brought to recovery room in stable condition.         Anh Hernandez MD    D: 06/12/2019 10:19:17       T: 06/12/2019 13:35:18     [id number]  Job#: [id number]     Doc#: [id number]    CC:

## 2019-06-12 NOTE — PLAN OF CARE
Problem: Cardiac:  Goal: Ability to maintain vital signs within normal range will improve  Description  Ability to maintain vital signs within normal range will improve  Outcome: Ongoing  Note:   Vital signs stable, respiratory rate normal, heart rate is WNLs and regular on cardiac monitor, +2 pulses and less than 3 second cap refill noted in all extremities, body color appropriate for ethnicity and temperature warm, edema noted to left upper extremity, patient repositions  self, and daily weights are ordered, no change from yesterday's weight. MD notified of weight gain. Will continue to monitor and reassess. Problem: Discharge Planning:  Goal: Discharged to appropriate level of care  Outcome: Ongoing  Note:   Working with patient, physician, and social work to discharge patient to the appropriate level of care. Problem: Mobility - Impaired:  Goal: Mobility will improve  Outcome: Ongoing     Problem: Infection - Surgical Site:  Goal: Will show no infection signs and symptoms  Outcome: Ongoing  Note:   Patient is alert and oriented, afebrile, has no complaints of pain, skin is intact and appropriate for ethnicity in color       Problem: Pain - Acute:  Goal: Pain level will decrease  Outcome: Ongoing  Note:   Pain /discomfort being managed with PRN analgesics per MD orders. Patient able to express presence and absence of pain and rate pain appropriately using numerical scale.         Problem: Skin Integrity:  Goal: Demonstration of wound healing without infection will improve  Outcome: Ongoing

## 2019-06-12 NOTE — BRIEF OP NOTE
Brief Postoperative Note  ______________________________________________________________    Patient: Mary Lou Fry  YOB: 1960  MRN: [id number]  Date of Procedure: 6/12/2019    Pre-Op Diagnosis: ARTHRITIS LEFT SHOULDER with Biceps Tendonitis    Post-Op Diagnosis: Same       Procedure(s):  LEFT REVERSE TOTAL SHOULDER REPLACEMENT with Biceps Tenodesis    Anesthesia: General    Surgeon(s):  Kobi Lopez MD    Assistant: Cameron    Estimated Blood Loss (mL): 989 cc    Complications: None    Specimens:   ID Type Source Tests Collected by Time Destination   A : A) Left shoulder bone and tissue Specimen Joint, Shoulder SURGICAL PATHOLOGY Kobi Lopez MD 6/12/2019 0913        Implants:  Implant Name Type Inv.  Item Serial No.  Lot No. LRB No. Used   IMPL SHLDR BASE STD PERFM REV 25MM - [id number] Shoulder/Arm/Wrist/Hand IMPL SHLDR BASE STD PERFM REV 25MM [id number] [employer]  Left 1   IMPL SHLDR GLENOID PERFM REV 36MM - [id number] Shoulder/Arm/Wrist/Hand IMPL SHLDR GLENOID PERFM REV 36MM [id number] [employer]  Left 1   SCREW PERFM REVRSD CENTRAL Screw/Plate/Nail/Chris SCREW PERFM REVRSD CENTRAL  [employer]  Left 1   SCREW PERFORM REV PERIPHERAL 30MM Screw/Plate/Nail/Chris SCREW PERFORM REV PERIPHERAL 30MM  [employer]  Left 1   SCREW PERFORM REV PERIPHERAL 26MM Screw/Plate/Nail/Chris SCREW PERFORM REV PERIPHERAL 26MM  [employer]  Left 1   IMPL SHLDR REVS INSRT [id number] - [id number] Shoulder/Arm/Wrist/Hand IMPL SHLDR REVS INSRT [id number] [employer]  Left 1   IMPL TRAY REVERSED ASCEND FLX ECCENT 0MM - [id number] Shoulder/Arm/Wrist/Hand IMPL TRAY REVERSED ASCEND FLX ECCENT 0MM [id number] [employer]  Left 1   IMPL SHLDR STEM ASCEND FLX LNG PTC SZ 3B 98MM - [id number] Shoulder/Arm/Wrist/Hand IMPL SHLDR STEM ASCEND FLX LNG PTC SZ 3B 98MM [id number] [employer]  Left 1         Drains: * No LDAs found *    Findings: OA L shoulder with Biceps tendonitis    Trish Portillo MD  Date: 6/12/2019  Time: 9:59 AM

## 2019-06-12 NOTE — ANESTHESIA PRE PROCEDURE
Department of Anesthesiology  Preprocedure Note       Name:  Robert Higgins   Age:  59 y.o.  :  1960                                          MRN:  [id number]         Date:  2019      Surgeon: Medina Lozano):  Gamaliel Lindsay MD    Procedure: LEFT REVERSE TOTAL SHOULDER REPLACEMENT (Left )    Medications prior to admission:   Prior to Admission medications    Medication Sig Start Date End Date Taking? Authorizing Provider   azelastine (ASTELIN) 0.1 % nasal spray 1 spray by Nasal route 2 times daily Use in each nostril as directed   Yes Historical Provider, MD   Misc Natural Products (GLUCOSAMINE CHONDROITIN TRIPLE PO) Take 1 capsule by mouth 2 times daily   Yes Historical Provider, MD   Ginkgo Biloba (GINKOBA PO) Take 1 tablet by mouth nightly   Yes Historical Provider, MD   diclofenac sodium 1 % GEL Apply 2 g topically as needed for Pain   Yes Historical Provider, MD   clindamycin (CLEOCIN) 300 MG capsule 1 tablet by mouth 1 hour before and 1 hour after procedure 17  Yes Gamaliel Lindsay MD   metoprolol succinate (TOPROL XL) 100 MG extended release tablet Take 100 mg by mouth daily 17  Yes Historical Provider, MD   famotidine (PEPCID) 40 MG tablet Take 40 mg by mouth nightly as needed    Yes Historical Provider, MD   tazarotene (TAZORAC) 0.1 % cream Apply topically nightly Apply topically nightly. Yes Historical Provider, MD   Multiple Vitamins-Minerals ([address] 50+) CAPS Take by mouth   Yes Historical Provider, MD   vitamin B-12 (CYANOCOBALAMIN) 250 MCG tablet Take 500 mcg by mouth daily   Yes Historical Provider, MD   lisinopril (PRINIVIL;ZESTRIL) 40 MG tablet TAKE 1 TABLET BY MOUTH DAILY 1/30/15  Yes Marnie Casiano MD   sertraline (ZOLOFT) 100 MG tablet Take 100 mg by mouth daily. Yes Historical Provider, MD   oxyCODONE-acetaminophen (PERCOCET) 5-325 MG per tablet Take 1-2 tablets by mouth every 4 hours as needed for Pain for up to 30 days.  19  Lyndsay Gomes --wnl Z98.890    Hyperglycemia R73.9    Recurrent UTI-sees dr Mary Mondragon for this--urology N39.0    Rosacea--sees dr Glenis Allen L71.9    Colon polyp----dr noonan K63.5    Arthritis of left knee M17.12    H/O total knee replacement, right Z96.651    Right knee pain M25.561    Arthritis of right knee M17.11    Arthritis of right knee M17.11    Arthritis of left shoulder region M19.012    Rotator cuff tendonitis, left M75.82       Past Medical History:        Diagnosis Date    Acid reflux     Clotting disorder ([address]     factor 2    Diverticulitis     Hypertension        Past Surgical History:        Procedure Laterality Date    ABDOMINAL HERNIA REPAIR  2011    ACHILLES TENDON SURGERY Right     Lengthening    ADENOIDECTOMY      CARPAL TUNNEL RELEASE      Bilateral     SECTION   &    HEMORRHOID SURGERY  1981    JOINT REPLACEMENT Left 2016    tkr    OTHER SURGICAL HISTORY  1998    Left Thumb Repair [employer]    SEPTOPLASTY      Nasal Septal Repair     TOTAL KNEE ARTHROPLASTY Right 2017    Dr Pacheco Donato         Social History:    Social History     Tobacco Use    Smoking status: Never Smoker    Smokeless tobacco: Never Used    Tobacco comment: counseled on tobacco exposure avoidance   Substance Use Topics    Alcohol use: Yes     Comment: Occasionally                                 Counseling given: Not Answered  Comment: counseled on tobacco exposure avoidance      Vital Signs (Current):   Vitals:    06/10/19 1746   Weight: 215 lb (97.5 kg)   Height: 5' 7\" (1.702 m)                                              BP Readings from Last 3 Encounters:   19 111/72   19 122/73   18 (!) 140/86       NPO Status:                                                                                 BMI:   Wt Readings from Last 3 Encounters:   06/10/19 215 lb (97.5 kg)   19 215 lb (97.5 kg)   19 215 lb (97.5 kg) Body mass index is 33.67 kg/m². CBC:   Lab Results   Component Value Date    WBC 5.9 05/28/2019    RBC 4.62 05/28/2019    HGB 13.8 05/28/2019    HCT 40.8 05/28/2019    MCV 88.2 05/28/2019    RDW 14.3 05/28/2019     05/28/2019       CMP:   Lab Results   Component Value Date     05/28/2019    K 4.5 05/28/2019     05/28/2019    CO2 20 05/28/2019    BUN 16 05/28/2019    CREATININE 0.7 05/28/2019    GFRAA >60 05/28/2019    GFRAA >60 06/14/2012    AGRATIO 1.8 12/16/2014    LABGLOM >60 05/28/2019    LABGLOM 87 05/17/2011    GLUCOSE 162 05/28/2019    GLUCOSE 83 05/17/2011    PROT 7.4 12/16/2014    PROT 7.6 06/14/2012    CALCIUM 9.6 05/28/2019    BILITOT 0.5 12/16/2014    ALKPHOS 57 12/16/2014    AST 22 12/16/2014    ALT 26 12/16/2014       POC Tests: No results for input(s): POCGLU, POCNA, POCK, POCCL, POCBUN, POCHEMO, POCHCT in the last 72 hours.     Coags:   Lab Results   Component Value Date    PROTIME 11.0 05/28/2019    INR 0.96 05/28/2019    APTT 31.0 05/28/2019       HCG (If Applicable): No results found for: PREGTESTUR, PREGSERUM, HCG, HCGQUANT     ABGs: No results found for: PHART, PO2ART, ORS7UTM, SZM0MDA, BEART, Z5HQVLWD     Type & Screen (If Applicable):  No results found for: LABABO, [address]    Anesthesia Evaluation  Patient summary reviewed and Nursing notes reviewed no history of anesthetic complications:   Airway: Mallampati: II  TM distance: >3 FB   Neck ROM: full  Mouth opening: > = 3 FB Dental:          Pulmonary:Negative Pulmonary ROS                              Cardiovascular:  Exercise tolerance: good (>4 METS),   (+) hypertension:,     (-) pacemaker, valvular problems/murmurs, past MI, CAD, CABG/stent, dysrhythmias,  angina,  CHF, orthopnea, PND and  LAGUNA      Rhythm: regular                      Neuro/Psych:   (+) neuromuscular disease:,    (-) seizures, TIA, CVA, headaches, psychiatric history and depression/anxiety            GI/Hepatic/Renal:   (+) GERD:,      (-) hiatal hernia, PUD, hepatitis, liver disease, no renal disease, bowel prep and no morbid obesity       Endo/Other:    (+) : arthritis: OA., no malignancy/cancer. (-) diabetes mellitus, hypothyroidism, hyperthyroidism, blood dyscrasia, no electrolyte abnormalities, no malignancy/cancer                ROS comment: A1c 6.6 Abdominal:           Vascular: negative vascular ROS. Anesthesia Plan      general     ASA 3       Induction: intravenous. MIPS: Postoperative opioids intended and Prophylactic antiemetics administered. Anesthetic plan and risks discussed with patient and spouse. Plan discussed with CRNA. Eugene Sparks MD   6/12/2019      This pre-anesthesia assessment may be used as a history and physical.    DOS STAFF ADDENDUM:    Pt seen and examined, chart reviewed (including anesthesia, drug and allergy history). No interval changes to history and physical examination. Anesthetic plan, risks, benefits, alternatives, and personnel involved discussed with patient. Patient verbalized an understanding and agrees to proceed.       Eugene Sparks MD  June 12, 2019  7:21 AM

## 2019-06-12 NOTE — ANESTHESIA POSTPROCEDURE EVALUATION
Department of Anesthesiology  Postprocedure Note    Patient: Des Mitchell  MRN: [id number]  YOB: 1960  Date of evaluation: 6/12/2019  Time:  5:48 PM     Procedure Summary     Date:  06/12/19 Room / Location:  [facility] OR 02 / [facility] OR    Anesthesia Start:  0825 Anesthesia Stop:  2304    Procedure:  LEFT REVERSE TOTAL SHOULDER REPLACEMENT (Left Shoulder) Diagnosis:       Shoulder arthritis      (ARTHRITIS LEFT SHOULDER)    Surgeon:  Fatoumata Lorenzo MD Responsible Provider:  Yung Strickland MD    Anesthesia Type:  general ASA Status:  3          Anesthesia Type: general    Maikel Phase I: Maikel Score: 9    Maikel Phase II:      Last vitals: Reviewed and per EMR flowsheets.        Anesthesia Post Evaluation    Patient location during evaluation: PACU  Patient participation: complete - patient participated  Level of consciousness: awake and alert  Pain score: 0  Airway patency: patent  Nausea & Vomiting: no nausea and no vomiting  Complications: no  Cardiovascular status: blood pressure returned to baseline  Respiratory status: acceptable  Hydration status: euvolemic

## 2019-06-12 NOTE — PROGRESS NOTES
limits      Orientation  Overall Orientation Status: Within Functional Limits  Orientation Level: Oriented to person;Oriented to place;Oriented to time;Oriented to situation     Balance  Sitting Balance: Stand by assistance  Standing Balance: Contact guard assistance  Functional Mobility  Functional - Mobility Device: No device  Activity: To/from bathroom  Assist Level: Contact guard assistance  Functional Mobility Comments: Pt completed fxl mobility to/from BR with no AD and CGA. Pt initially holding onto IV pole for balance, but progressed to no AD. ADL  Feeding: Setup  Grooming: Contact guard assistance(standing at sink to wash R hand)  UE Dressing: (pt educated on UB dressing techniques with L TSR restrictions and shown visual demonstration, pt verbalized understanding. Assist to don L UE sling)  LB dressing: Pt reports spouse assisted with donning underwear  Toileting: Contact guard assistance(to void, pt managed underwear down/up with CGA, pericare in seated with SBA)  Additional Comments: Anticipate pt is mod/max A UB dressing/bathing, mod A LB dressing/bathing based on ROM/strength, balance, endurance. Quality of Movement Other  Comment: pt is R handed and WFL.  L UE limited by sling/restrictions     Bed mobility  Supine to Sit: Contact guard assistance(HOB elevated, increased time)  Sit to Supine: Unable to assess(pt seated in recliner at end of session )  Scooting: Stand by assistance(to scoot to EOB)     Transfers  Sit to stand: Contact guard assistance  Stand to sit: Contact guard assistance   Toilet Transfers  Toilet - Technique: Ambulating  Equipment Used: Grab bars  Toilet Transfer: Contact guard assistance    Vision - Basic Assessment  Prior Vision: Wears glasses only for reading     Cognition  Overall Cognitive Status: WFL  Safety Judgement: Decreased awareness of need for safety;Decreased awareness of need for assistance(impulsive at times)  Insights: Decreased awareness of deficits Sensation  Overall Sensation Status: WFL(pt denies N/T in L UE)     Type of ROM/Therapeutic Exercise  Comment: Pt educated on Codman's exercises and reviewed HEP handout. Pt verbalized understanding, will formally address next session. LUE AROM (degrees)  LUE AROM : Exceptions  LUE General AROM: s/p L reverse TSR, NWB LUE in sling, hand WFL  RUE AROM (degrees)  RUE AROM : WFL     LUE Strength  LUE Strength Comment: s/p L reverse TSR, NWB L UE in sling, not formally assessed d/t restrictoins  RUE Strength  Gross RUE Strength: WFL                   Plan   Plan  Times per week: 3-5  Times per day: Daily  Current Treatment Recommendations: ROM, Functional Mobility Training, Safety Education & Training, Self-Care / ADL, Patient/Caregiver Education & Training         OutComes Score    How much help for putting on and taking off regular lower body clothing?: A Lot  How much help for Bathing?: A Lot  How much help for Toileting?: A Little  How much help for putting on and taking off regular upper body clothing?: A Lot  How much help for taking care of personal grooming?: A Little  How much help for eating meals?: None  AM-[facility] Inpatient Daily Activity Raw Score: 16  AM-PAC Inpatient ADL T-Scale Score : 35.96  ADL Inpatient CMS 0-100% Score: 53.32  ADL Inpatient CMS G-Code Modifier : CK                                               AM-PAC Score        AM-[facility] Inpatient Daily Activity Raw Score: 16 (06/12/19 1602)  AM-PAC Inpatient ADL T-Scale Score : 35.96 (06/12/19 1602)  ADL Inpatient CMS 0-100% Score: 53.32 (06/12/19 1602)  ADL Inpatient CMS G-Code Modifier : CK (06/12/19 1602)    Goals  Short term goals  Time Frame for Short term goals: Prior to d/c:  Short term goal 1: Pt will complete UB bathing/dressing with min A. Short term goal 2: Pt will complete LB bathing/dressing with min A. Short term goal 3: Pt will complete toileting with SBA/supervision.   Short term goal 4: Pt will complete Codman's HEP L UE with

## 2019-06-12 NOTE — PROGRESS NOTES
Met with patient and family at bedside, patient is resting in bed. discussed role of nurse navigator and gave contact information. Reviewed reasons to call with questions or concerns, importance of TEDS, Incentive spirometer, pain medication, and occupational therapy. 2/4 bed rails up, bed in lowest position, fall precautions in place, call light within reach. Pulses present bilaterally +2 radial, no  odor noted at surgical dressing left anterior shoulder, small spot shadow drainage noted sangious. dry Dressing intact. Ice in place. Damien and scds on BLEs. DC Plan: home w .  to transport patient.   DME needs:pedro Peace  Orthopedic Nurse Navigator  Phone number: [phone]  Electronically signed by Cosme Yan RN on 6/12/2019 at 3:46 PM

## 2019-06-12 NOTE — H&P
Temp: 97.6 °F (36.4 °C)   SpO2: 96%      Airway is intact   Chest: breathing comfortably   Heart: regular rate and rhythm. Examination of the body region where surgery is to be performed shows skin is intact at the operative site.       Lyndsay Abbott MD    Electronically signed by Fatoumata Lorenzo MD on 6/12/2019 at 8:15 AM

## 2019-06-12 NOTE — PROGRESS NOTES
Patient arouses to name. Patient C/O surgical pain at 10 of 10 and medicated per order. See MAR. VSS. IV patent. Moving all extremities to command. Left shoulder dressing dry and intact with ice pack on.

## 2019-06-13 VITALS
TEMPERATURE: 98.4 F | WEIGHT: 216.93 LBS | DIASTOLIC BLOOD PRESSURE: 60 MMHG | OXYGEN SATURATION: 94 % | HEART RATE: 80 BPM | HEIGHT: 66 IN | SYSTOLIC BLOOD PRESSURE: 126 MMHG | BODY MASS INDEX: 34.86 KG/M2 | RESPIRATION RATE: 16 BRPM

## 2019-06-13 LAB
ESTIMATED AVERAGE GLUCOSE: 137 MG/DL
GLUCOSE BLD-MCNC: 119 MG/DL (ref 70–99)
HBA1C MFR BLD: 6.4 %
HCT VFR BLD CALC: 36.4 % (ref 36–48)
HEMOGLOBIN: 12.2 G/DL (ref 12–16)
PERFORMED ON: ABNORMAL

## 2019-06-13 PROCEDURE — 85018 HEMOGLOBIN: CPT

## 2019-06-13 PROCEDURE — 97535 SELF CARE MNGMENT TRAINING: CPT

## 2019-06-13 PROCEDURE — 94760 N-INVAS EAR/PLS OXIMETRY 1: CPT

## 2019-06-13 PROCEDURE — 6370000000 HC RX 637 (ALT 250 FOR IP): Performed by: PHYSICIAN ASSISTANT

## 2019-06-13 PROCEDURE — 2580000003 HC RX 258: Performed by: ORTHOPAEDIC SURGERY

## 2019-06-13 PROCEDURE — 85014 HEMATOCRIT: CPT

## 2019-06-13 PROCEDURE — 97110 THERAPEUTIC EXERCISES: CPT

## 2019-06-13 PROCEDURE — 97530 THERAPEUTIC ACTIVITIES: CPT

## 2019-06-13 PROCEDURE — 6370000000 HC RX 637 (ALT 250 FOR IP): Performed by: ORTHOPAEDIC SURGERY

## 2019-06-13 PROCEDURE — 36415 COLL VENOUS BLD VENIPUNCTURE: CPT

## 2019-06-13 RX ORDER — ASPIRIN 325 MG
325 TABLET, DELAYED RELEASE (ENTERIC COATED) ORAL EVERY 12 HOURS
Qty: 30 TABLET | Refills: 3 | Status: SHIPPED | OUTPATIENT
Start: 2019-06-13 | End: 2022-03-11

## 2019-06-13 RX ORDER — ASPIRIN 325 MG
325 TABLET, DELAYED RELEASE (ENTERIC COATED) ORAL EVERY 12 HOURS
Qty: 30 TABLET | Refills: 3 | Status: SHIPPED | OUTPATIENT
Start: 2019-06-13 | End: 2019-06-13 | Stop reason: SDUPTHER

## 2019-06-13 RX ORDER — FAMOTIDINE 20 MG/1
20 TABLET, FILM COATED ORAL 2 TIMES DAILY
Status: DISCONTINUED | OUTPATIENT
Start: 2019-06-13 | End: 2019-06-13 | Stop reason: HOSPADM

## 2019-06-13 RX ORDER — ASPIRIN 325 MG
325 TABLET ORAL 2 TIMES DAILY
Status: DISCONTINUED | OUTPATIENT
Start: 2019-06-13 | End: 2019-06-13 | Stop reason: HOSPADM

## 2019-06-13 RX ADMIN — FAMOTIDINE 20 MG: 20 TABLET, FILM COATED ORAL at 09:12

## 2019-06-13 RX ADMIN — DOCUSATE SODIUM 100 MG: 100 CAPSULE, LIQUID FILLED ORAL at 09:12

## 2019-06-13 RX ADMIN — LISINOPRIL 40 MG: 40 TABLET ORAL at 09:12

## 2019-06-13 RX ADMIN — HYDROMORPHONE HYDROCHLORIDE 1 MG: 2 TABLET ORAL at 06:09

## 2019-06-13 RX ADMIN — ASPIRIN 325 MG ORAL TABLET 325 MG: 325 PILL ORAL at 08:24

## 2019-06-13 RX ADMIN — INSULIN LISPRO 8 UNITS: 100 INJECTION, SOLUTION INTRAVENOUS; SUBCUTANEOUS at 09:13

## 2019-06-13 RX ADMIN — Medication 10 ML: at 09:12

## 2019-06-13 RX ADMIN — METOPROLOL SUCCINATE 100 MG: 50 TABLET, EXTENDED RELEASE ORAL at 09:12

## 2019-06-13 ASSESSMENT — PAIN DESCRIPTION - ONSET: ONSET: ON-GOING

## 2019-06-13 ASSESSMENT — PAIN DESCRIPTION - LOCATION: LOCATION: SHOULDER

## 2019-06-13 ASSESSMENT — PAIN SCALES - GENERAL
PAINLEVEL_OUTOF10: 0
PAINLEVEL_OUTOF10: 0
PAINLEVEL_OUTOF10: 6

## 2019-06-13 ASSESSMENT — PAIN - FUNCTIONAL ASSESSMENT: PAIN_FUNCTIONAL_ASSESSMENT: PREVENTS OR INTERFERES SOME ACTIVE ACTIVITIES AND ADLS

## 2019-06-13 ASSESSMENT — PAIN DESCRIPTION - PROGRESSION: CLINICAL_PROGRESSION: NOT CHANGED

## 2019-06-13 ASSESSMENT — PAIN DESCRIPTION - DESCRIPTORS: DESCRIPTORS: DISCOMFORT

## 2019-06-13 ASSESSMENT — PAIN DESCRIPTION - ORIENTATION: ORIENTATION: LEFT

## 2019-06-13 ASSESSMENT — PAIN DESCRIPTION - PAIN TYPE: TYPE: SURGICAL PAIN

## 2019-06-13 ASSESSMENT — PAIN DESCRIPTION - FREQUENCY: FREQUENCY: INTERMITTENT

## 2019-06-13 NOTE — PROGRESS NOTES
Occupational Therapy  Facility/Department: [address] ORTHOPEDICS  Daily Treatment Note  NAME: Brenden Jean  : 1960  MRN: [id number]    Date of Service: 2019    Discharge Recommendations:  24 hour supervision or assist       Assessment: Discussed with OTR AM-PAC score of 19. Pt completed sit<>stand transfers and fx'l mobility using no device with supervision, LB dressing with CGA and UB dressing no more than min A. Pt completed Codman's exercises with demonstrations and v/c's for techniques. Pt safe to d/c home with 24/ supervision/assist.        Patient Diagnosis(es): The encounter diagnosis was Shoulder arthritis. has a past medical history of Acid reflux, Clotting disorder ([address] Diabetes mellitus ([facility] [address] Diverticulitis, and Hypertension. has a past surgical history that includes Adenoidectomy (); Septoplasty (); Hemorrhoid surgery (); Umbilical hernia repair ();  section ( &); Carpal tunnel release (); other surgical history (); Achilles tendon surgery (Right, ); Abdominal hernia repair (); joint replacement (Left, ); Total knee arthroplasty (Right, 2017); Total shoulder arthroplasty (Left, 2019); and REVISION OF TOTAL SHOULDER (Left, 2019). Restrictions  Restrictions/Precautions  Restrictions/Precautions: Fall Risk, Weight Bearing  Upper Extremity Weight Bearing Restrictions  Left Upper Extremity Weight Bearing: Non Weight Bearing  Position Activity Restriction  Other position/activity restrictions: s/p L reverse TSR with biceps tenodesis, NWB L UE in sling  Subjective   General  Chart Reviewed: Yes  Additional Pertinent Hx: Pt is a 59 y.o. female admitted for elective L reverse TSR with biceps tenodesis 19, NWB L UE in sling.   Response to previous treatment: Patient with no complaints from previous session  Family / Caregiver Present: No  Referring Practitioner: Kely Heredia MD  Diagnosis: L TSR  Subjective  Subjective: Pt seated in recliner chair upon arrival, agreeable to OT tx. Reports pain at 7/10 before receiving pain meds this AM, pain lessened since then but does note rate. Orientation  Orientation  Overall Orientation Status: Within Functional Limits  Objective    ADL  Feeding: Setup(seated in recliner)  UE Dressing: Minimal assistance(Seated in chair to marisol button up shirt, with v/c's and slight assist to bring shirt around back. Assist to marisol sling L UE.)  LE Dressing: Contact guard assistance(seated to thread underwear and pants, stood 2x to pull clothing over hips. Assist to marisol stanley hose B LE. Slight assist for shoes this session.)        Balance  Sitting Balance: Independent(seated in recliner)  Standing Balance: Supervision  Standing Balance  Time: NT  Activity: fx'l mobility, ADLs  Comment: Pt steady, no LOB noted  Functional Mobility  Functional - Mobility Device: No device  Activity: To/from bathroom  Assist Level: Supervision  Functional Mobility Comments: Pt completed fx'l mobility to/from bathroom using no AD and supervision. Pt steady and no LOB noted. Bed mobility  Supine to Sit: Unable to assess  Sit to Supine: Unable to assess  Comment: seated in recliner chair upon arrival  Transfers  Sit to stand: Supervision  Stand to sit: Supervision  Transfer Comments: Sit <> stand from from recliner to chair with supervision, no LOB. Pt maintained NWB restrictions L UE during transfers     Cognition  Overall Cognitive Status: WFL        Type of ROM/Therapeutic Exercise  Comment: Pt completed Codman's exercises with demostrations and v/c's to adjust technique. Assessment   Performance deficits / Impairments: Decreased functional mobility ; Decreased ADL status; Decreased ROM; Decreased cognition;Decreased endurance;Decreased high-level IADLs  Assessment: Discussed with OTR AM-PAC score of 19.  Pt completed sit<>stand transfers and fx'l mobility using no device with supervision, LB dressing with CGA and UB dressing no more than min A. Pt completed Codman's exercises with demonstrations and v/c's for techniques. Pt safe to d/c home with 24/7 supervision/assist.  Patient Education: role of OT, L TSR restrictions, safety, Codman's HEP, ADL retraining, stanley hose  Activity Tolerance  Activity Tolerance: Patient Tolerated treatment well  Safety Devices  Type of devices: Call light within reach; Left in chair;Nurse notified(TIMBO nazario)        Plan   Plan  Times per week: 3-5  Times per day: Daily  Current Treatment Recommendations: ROM, Functional Mobility Training, Safety Education & Training, Self-Care / ADL, Patient/Caregiver Education & Training    AM-PAC Score        AM-PAC Inpatient Daily Activity Raw Score: 19 (06/13/19 0848)  AM-PAC Inpatient ADL T-Scale Score : 40.22 (06/13/19 0848)  ADL Inpatient CMS 0-100% Score: 42.8 (06/13/19 0848)  ADL Inpatient CMS G-Code Modifier : CK (06/13/19 0848)    Goals  Short term goals  Time Frame for Short term goals: Prior to d/c: all goals ongoing  Short term goal 1: Pt will complete UB bathing/dressing with min A.: goal partially met 6/13/19  Short term goal 2: Pt will complete LB bathing/dressing with min A.: goal partially met 6/13/19  Short term goal 3: Pt will complete toileting with SBA/supervision. Short term goal 4: Pt will complete Codman's HEP L UE with supervision.:  Short term goal 5: Pt will don/doff L UE sling with SBA. Long term goals  Time Frame for Long term goals : STG=LTG  Patient Goals   Patient goals : to return home.        Therapy Time   Individual Concurrent Group Co-treatment   Time In 0800         Time Out 0845         Minutes 45                 If discharged prior to next OT session please see last daily note for discharge status    Electronically signed by Ashleigh POLLACK on 6/13/2019 at 9:09 AM  Caio Zimmer  I attest that I was present for and made a skilled and mindful clinical judgement during the treatment of this patient 6/13/2019

## 2019-06-13 NOTE — PROGRESS NOTES
Clinical Pharmacy Note  Medication Counseling    Reviewed new medications started during hospital admission: hydromorphone, ASA  mg BID. Indications and side effects were emphasized during counseling. All medication-related questions addressed. Patient verbalized understanding of education. Should the patient express any additional questions or concerns regarding their medications, please do not hesitate to contact the pharmacy department. Patient/caregiver aware they may refuse medications during hospital stay.    Grant Desir RPh 6/13/2019 9:59 AM

## 2019-06-13 NOTE — PROGRESS NOTES
Patient is alert & oriented x4, x1 assist, 2/4 bed rails up, bed in lowest position, fall precautions in place, call light within reach. Morning assessment complete. Morning medications given.  to transport home around 1030. Will cont to monitor and reassess.   Electronically signed by Angie Garland RN on 6/13/2019 at 9:58 AM

## 2019-06-13 NOTE — PROGRESS NOTES
POD#1  Blood pressure 137/76, pulse 85, temperature 98.3 °F (36.8 °C), temperature source Oral, resp. rate 16, height 5' 6\" (1.676 m), weight 216 lb 14.9 oz (98.4 kg), last menstrual period 09/08/2012, SpO2 91 %, not currently breastfeeding. Patient in bed    VSS  Temp- Blood pressure 118/80, pulse 72, temperature 98.6 °F (37 °C), temperature source Oral, resp. rate 14, height 5' 9\" (1.753 m), weight 230 lb (104.327 kg), SpO2 97.00%.   Hemoglobin and Hematocrit 12/36  Neuro intact  orthopeadic stable  Post Op X-Rays are Satisfactory  Plan on d/c to home today      Electronically signed by REESE Bruno on 2/12/2014 at 10:21 AM

## 2019-06-13 NOTE — PROGRESS NOTES
Era performed this morning including Nurse navigator Sheng Hopkins and Occupational therapist karen. Discussed plan of care, discharge plan, and dme needs if applicable for orthopedic total joint patient.     Electronically signed by Theo Davison RN on 6/13/2019 at 11:40 AM

## 2019-06-13 NOTE — PLAN OF CARE
Problem: Cardiac:  Goal: Ability to maintain vital signs within normal range will improve  Description  Ability to maintain vital signs within normal range will improve  6/13/2019 1031 by Celio Castaneda RN  Outcome: Ongoing  Note:   Monitoring pt's vital signs per unit's protocol      Problem: Discharge Planning:  Goal: Discharged to appropriate level of care  Outcome: Ongoing  Note:   Pt being discharged to the appropriate level of care     Problem: Mobility - Impaired:  Goal: Mobility will improve  6/13/2019 1031 by Celio Castaneda RN  Outcome: Ongoing  Note:   Mobility remains ongoing on this shift     Problem: Infection - Surgical Site:  Goal: Will show no infection signs and symptoms  6/13/2019 1031 by Celio Castaneda RN  Outcome: Ongoing  Note:   Pt will show no signs of infection on this shift      Problem: Pain - Acute:  Goal: Pain level will decrease  Description  Pain level will decrease  6/13/2019 1031 by Celio Castaneda RN  Outcome: Ongoing  Note:   Assessing and monitoring pt's pain. PRN pain medication being given if ordered. Educating pt on nonpharmacologic interventions for pain control. Will continue to monitor and reassess. Problem: Skin Integrity:  Goal: Demonstration of wound healing without infection will improve  Outcome: Ongoing  Note:   Pt will demonstrate wound healing without infection on this shift      Problem: Falls - Risk of:  Goal: Will remain free from falls  Description  Will remain free from falls  Outcome: Ongoing  Note:   Fall risk assessment completed. Fall precautions in place. Bed in lowest position, wheels locked, bed/chair exit alarm in place, call light within reach, and non skid footwear on. Walkway free of clutter. Pt alert and oriented and able to make needs known. Pt educated to use call light when needing to get up, and pt utilizes call light to make needs known. Will continue to monitor.         Problem: Pain:  Goal: Pain level will decrease  Description  Pain level will decrease  6/13/2019 1031 by Christi Richmond RN  Outcome: Ongoing  Note:   Assessing and monitoring pt's pain. PRN pain medication being given if ordered. Educating pt on nonpharmacologic interventions for pain control. Will continue to monitor and reassess. Problem: Pain:  Goal: Control of acute pain  Description  Control of acute pain  Outcome: Ongoing  Note:   Assessing and monitoring pt's pain. PRN pain medication being given if ordered. Educating pt on nonpharmacologic interventions for pain control. Will continue to monitor and reassess. Problem: Pain:  Goal: Control of chronic pain  Description  Control of chronic pain  Outcome: Ongoing  Note:   Assessing and monitoring pt's pain. PRN pain medication being given if ordered. Educating pt on nonpharmacologic interventions for pain control. Will continue to monitor and reassess.

## 2019-06-13 NOTE — PLAN OF CARE
PRN pain meds and ice to treat pain. No falls - calls appropriately, nonskid socks, bed low, belonging in reach.

## 2019-06-13 NOTE — PROGRESS NOTES
Pt resting in bed. A&Ox4. VSS, afebrile. Treated for pain per orders, ice applied to LUE. Sling in place to LUE. Bed low, call light in reach, will monitor.

## 2019-06-13 NOTE — PROGRESS NOTES
Data- discharge order received, patient verbalized agreement to discharge, disposition to previous residence. Action- discharge instructions prepared and given to patient, patient verbalized understanding. Medication information packet given r/t NEW and/or CHANGED prescriptions emphasizing name/purpose/side effects, pt verbalized understanding. Discharge instruction summary: Diet- general, Activity- [facility], Primary Care Physician as followsMontana Nolan [phone]. f/u appointment with orthopedic office noted below, immunizations reviewed and discussed with patient, prescription medications to be filled by retail pharmacy and then delivered. Inpatient surgical procedure precautions reviewed: . Neurovascular check performed and patient is WNLs, denies numbness/tingling in extremties. Incision site  prineo glue dressing assessed and is  clean,dry, and intact, no signs of redness, drainage, or odor noted. patient's bedside RN [facility] notified of patient completing discharge instructions and iv removal.    Response- IV removed. Medications to be delivered to patient via meds to bed program. Disposition is home with  , to be transported with family, no complications.      Future Appointments   Date Time Provider Caio Conti   6/27/2019 10:30 AM MD Janelle Carlson   12/12/2019  9:45 AM MD Janelle Carlson           Electronically signed by Mona Bowles RN on 6/13/2019 at 11:41 AM

## 2019-06-17 NOTE — DISCHARGE SUMMARY
Physician Discharge Summary     Patient ID:  Robert Higgins  [id number]  27 y.o.  1960    Admit date: 6/12/2019    Discharge date and time: 6/13/2019 10:58 AM     Admitting Physician: Gamaliel Lindsay MD     Discharge Physician: Fercho Alvarez. Conchis Armijo M.D. Admission Diagnoses: Other specified arthritis, left shoulder [Z70.063]    Discharge Diagnoses: left shoulder arthritis    Admission Condition: good    Discharged Condition: good    Indication for Admission: Failed conservative treatment as outpatient for joint pain including PT and pain meds. This patient was then electively scheduled for total joint replacement surgery    Surgical procedure: reverse total shoulder    Consults: PT OT SS    This patient had no postoperative complications. They has PT and OT for ADL's . IV and PO pain med for pain control and was eventually DC in stable condition    Treatments: analgesia,  therapies: PT OT,  and surgery      Disposition: home    Patient Instructions:   [unfilled]  Activity: activity as tolerated  Diet: regular diet  Wound Care: keep wound clean and dry    Follow-up with Fercho Alvarez. Conchis Armijo M.D.   in 10 days.     Signed:  Dianna Grider  6/17/2019  9:10 AM

## 2019-06-18 ENCOUNTER — TELEPHONE (OUTPATIENT)
Dept: ORTHOPEDICS UNIT | Age: 59
End: 2019-06-18

## 2019-06-18 NOTE — TELEPHONE ENCOUNTER
Attempted to contact patient. Left voicemail for patient stating purpose and call back number.    Halina Rodney  Orthopedic Nurse Navigator  Phone number: [phone]  Future Appointments   Date Time Provider Caio Conti   6/27/2019 10:30 AM MD Wally Prasad Click [facility]   12/12/2019  9:45 AM MD Wally Prasad [facility]       Electronically signed by Victoriano Lozano RN on 6/18/2019 at 4:18 PM

## 2019-06-20 ENCOUNTER — TELEPHONE (OUTPATIENT)
Dept: ORTHOPEDICS UNIT | Age: 59
End: 2019-06-20

## 2019-06-20 NOTE — TELEPHONE ENCOUNTER
Attempted to contact patient. Left voicemail for patient stating purpose and call back number.    Mila Weston  Orthopedic Nurse Navigator  Phone number: [phone]  Future Appointments   Date Time Provider Caio Conti   6/27/2019 10:30 AM MD Thanh Lackey   12/12/2019  9:45 AM MD Thanh Lackey       Electronically signed by Suzie Gallardo RN on 6/20/2019 at 4:45 PM

## 2019-06-27 ENCOUNTER — OFFICE VISIT (OUTPATIENT)
Dept: ORTHOPEDIC SURGERY | Age: 59
End: 2019-06-27

## 2019-06-27 VITALS — HEIGHT: 64 IN | TEMPERATURE: 98.3 F | BODY MASS INDEX: 38.24 KG/M2 | WEIGHT: 224 LBS

## 2019-06-27 DIAGNOSIS — Z96.612 S/P REVERSE TOTAL SHOULDER ARTHROPLASTY, LEFT: Primary | ICD-10-CM

## 2019-06-27 PROCEDURE — 99024 POSTOP FOLLOW-UP VISIT: CPT | Performed by: PHYSICIAN ASSISTANT

## 2019-06-27 NOTE — PROGRESS NOTES
This dictation was done with [employer] dictation and may contain mechanical errors related to translation. Temperature 98.3 °F (36.8 °C), temperature source Temporal, height 5' 4\" (1.626 m), weight 224 lb (101.6 kg), last menstrual period 09/08/2012, not currently breastfeeding. This is a pleasant 69-year-old female who recently had a left reverse total shoulder surgery on 6/12/2019. All in all she is doing extremely well she states she only took pain pills for the first 2 days she is in a sling but she can do her Codman exercises very well her incisions healing nicely we talked about wound care. At this visit the X-rays, presenting symptoms, and chief complaint were presented to Whitley Baez. Puma Benson M.D. He then evaluated the patient. He spent several minutes discussing face to face with the patient the clinical diagnosis and medical course options,from conservative to aggressive including risks and benefits. The AP transaxillary view and Y view show excellent alignment sizing and fit of the left she states she has 0-10 pain she is doing well she is neurologically intact of the left hand. We will have her start physical therapy around 4 to 6 weeks postop and follow-up with us around a month reverse total shoulder with no lucencies good screw fixation into the glenoid or any other bony abnormalities.

## 2019-07-11 ENCOUNTER — TELEPHONE (OUTPATIENT)
Dept: ORTHOPEDIC SURGERY | Age: 59
End: 2019-07-11

## 2019-07-11 DIAGNOSIS — Z96.612 S/P REVERSE TOTAL SHOULDER ARTHROPLASTY, LEFT: Primary | ICD-10-CM

## 2019-07-15 ENCOUNTER — APPOINTMENT (OUTPATIENT)
Dept: PHYSICAL THERAPY | Age: 59
End: 2019-07-15
Payer: COMMERCIAL

## 2019-07-15 ENCOUNTER — HOSPITAL ENCOUNTER (OUTPATIENT)
Dept: PHYSICAL THERAPY | Age: 59
Setting detail: THERAPIES SERIES
Discharge: HOME | End: 2019-07-15
Payer: COMMERCIAL

## 2019-07-15 PROCEDURE — 97530 THERAPEUTIC ACTIVITIES: CPT | Performed by: PHYSICAL THERAPIST

## 2019-07-15 PROCEDURE — 97161 PT EVAL LOW COMPLEX 20 MIN: CPT | Performed by: PHYSICAL THERAPIST

## 2019-07-15 PROCEDURE — 97110 THERAPEUTIC EXERCISES: CPT | Performed by: PHYSICAL THERAPIST

## 2019-07-15 NOTE — PLAN OF CARE
[facility], [address] [place] [address], [address] [place] [address]  Phone: [phone]   Fax: [phone]          Physical Therapy Certification    Dear Referring Practitioner: Ruth Reed MD,    We had the pleasure of evaluating the following patient for physical therapy services at [address] [place] [address]. A summary of our findings can be found in the initial assessment below. This includes our plan of care. If you have any questions or concerns regarding these findings, please do not hesitate to contact me at the office phone number checked above. Thank you for the referral.       Physician Signature:_______________________________Date:__________________  By signing above (or electronic signature), therapists plan is approved by physician      Patient: Taiwo Aguirre   : 1960   MRN: [id number]  Referring Physician: Referring Practitioner: Ruth eRed MD      Evaluation Date: 7/15/2019      Medical Diagnosis Information:  Diagnosis: B19.884 (ICD-10-CM) - S/P reverse total shoulder arthroplasty, left on 19   Treatment Diagnosis: M25.512 (L) shoulder pain; M62.81 muscle weakness                                           Precautions/ Contra-indications: no shoulder motion behind back (combined adduction, IR, and extn); no GH extn beyond neutral for 12 weeks post op. no supporting body-weight or lifting with objects  Latex Allergy:  [x]NO      []YES  Preferred Language for Healthcare:   [x]English       []other:    SUBJECTIVE: Patient stated complaint:   Pt presents s/p L shoulder TSA and is 4.5 weeks out from surgery. Pt was having pain in shoulder for a couple years. She tried to do PT and cortisone injections prior to surgery but did not help. After surgery pt was in sling for 2 weeks. Pt has been doing pendulum swings at home and this is it.  Pt was told she can start using her arm more except for reaching above barriers              []Environmental, home barriers              []profession/work barriers  []past PT/medical experience  []other:  Justification:      Falls Risk Assessment (30 days):   [x] Falls Risk assessed and no intervention required. [] Falls Risk assessed and Patient requires intervention due to being higher risk   TUG score (>12s at risk):     [] Falls education provided, including       ASSESSMENT:   Functional Impairments   []Noted spinal or UE joint hypomobility   []Noted spinal or UE joint hypermobility   [x]Decreased UE functional ROM   [x]Decreased UE functional strength   []Abnormal reflexes/sensation/myotomal/dermatomal deficits   [x]Decreased RC/scapular/core strength and neuromuscular control   []other:      Functional Activity Limitations (from functional questionnaire and intake)   [x]Reduced ability to tolerate prolonged functional positions   [x]Reduced ability or difficulty with changes of positions or transfers between positions   [x]Reduced ability to maintain good posture and demonstrate good body mechanics with sitting, bending, and lifting   [x] Reduced ability or tolerance with driving and/or computer work   [x]Reduced ability to sleep   [x]Reduced ability to perform lifting, reaching, carrying tasks   [x]Reduced ability to tolerate impact through UE   [x]Reduced ability to reach behind back   [x]Reduced ability to  or hold objects   [x]Reduced ability to throw or toss an object   []other:    Participation Restrictions   [x]Reduced participation in self care activities   [x]Reduced participation in home management activities   [x]Reduced participation in work activities   [x]Reduced participation in social activities. [x]Reduced participation in sport/recreation activities. Classification:   [x]Signs/symptoms consistent with post-surgical status including decreased ROM, strength and function.   []Signs/symptoms consistent with joint sprain/strain   []Signs/symptoms consistent Disability index score of 85% or more for the UEFI to assist with reaching prior level of function. 2. Patient will demonstrate increased shoulder AROM to 150 flex, 140 abd, 40 ER at 90 abd to allow for proper joint functioning as indicated by patients Functional Deficits. 3. Patient will demonstrate an increase in L shoulder strength to 4/5 flex, abd, and ER, 4+/5 IR to allow for proper functional mobility as indicated by patients Functional Deficits. 4. Patient will return to ADLs above shoulder height without increased symptoms or restriction. 5. Pt will return to home management with normal use of L UE without pain or limitation. Physical Therapy Evaluation Complexity Justification  [x] A history of present problem with:  [] no personal factors and/or comorbidities that impact the plan of care;  [x]1-2 personal factors and/or comorbidities that impact the plan of care  []3 personal factors and/or comorbidities that impact the plan of care  [x] An examination of body systems using standardized tests and measures addressing any of the following: body structures and functions (impairments), activity limitations, and/or participation restrictions;:  [] a total of 1-2 or more elements   [] a total of 3 or more elements   [x] a total of 4 or more elements   [x] A clinical presentation with:  [x] stable and/or uncomplicated characteristics   [] evolving clinical presentation with changing characteristics  [] unstable and unpredictable characteristics;   [x] Clinical decision making of [x] low, [] moderate, [] high complexity using standardized patient assessment instrument and/or measurable assessment of functional outcome.     [x] EVAL (LOW) 60237 (typically 20 minutes face-to-face)  [] EVAL (MOD) 25933 (typically 30 minutes face-to-face)  [] EVAL (HIGH) 24095 (typically 45 minutes face-to-face)  [] RE-EVAL 53674    Electronically signed by:  Jessica Garcia, PT, DPT

## 2019-07-19 ENCOUNTER — HOSPITAL ENCOUNTER (OUTPATIENT)
Dept: PHYSICAL THERAPY | Age: 59
Setting detail: THERAPIES SERIES
Discharge: HOME | End: 2019-07-19
Payer: COMMERCIAL

## 2019-07-19 PROCEDURE — 97530 THERAPEUTIC ACTIVITIES: CPT | Performed by: PHYSICAL THERAPIST

## 2019-07-19 PROCEDURE — 97110 THERAPEUTIC EXERCISES: CPT | Performed by: PHYSICAL THERAPIST

## 2019-07-19 PROCEDURE — 97112 NEUROMUSCULAR REEDUCATION: CPT | Performed by: PHYSICAL THERAPIST

## 2019-07-23 ENCOUNTER — HOSPITAL ENCOUNTER (OUTPATIENT)
Dept: PHYSICAL THERAPY | Age: 59
Setting detail: THERAPIES SERIES
Discharge: HOME | End: 2019-07-23
Payer: COMMERCIAL

## 2019-07-23 PROCEDURE — 97110 THERAPEUTIC EXERCISES: CPT | Performed by: PHYSICAL THERAPIST

## 2019-07-23 PROCEDURE — 97530 THERAPEUTIC ACTIVITIES: CPT | Performed by: PHYSICAL THERAPIST

## 2019-07-23 PROCEDURE — 97112 NEUROMUSCULAR REEDUCATION: CPT | Performed by: PHYSICAL THERAPIST

## 2019-07-23 NOTE — FLOWSHEET NOTE
Full Can       Empty Can       Jeremias Carson       Nerve Tension Testing       Speed's       Gonzalez's        Spurling's       Repeated Scaption                        Reflexes/Sensation (myotomes/dermatomes): reports full sensation throughout     Joint mobility: n/t              []Normal               []Hypo              []Hyper     Palpation: n/t     Functional Mobility/Transfers: Indep     Posture: no longer wearing sling and fair posture     Bandages/Dressings/Incisions: Incisions are clean, dry, and intact without signs of infection and healing well- pt has been putting Vitamin E oil on them        Functional assessment on 7/15/2019   52.5%      RESTRICTIONS/PRECAUTIONS: no shoulder motion behind back (combined adduction, IR, and extn); no GH extn beyond neutral for 12 weeks post op.  no supporting body-weight or lifting with objects    Exercises/Interventions:   Exercise/Equipment Resistance/Repetitions Other comments   Aerobic Conditioning     Aerodyne          Stretching/PROM     Cane ER 10 x 10 secs seated at side    Wand flex     Towel press up     Passive shoulder flex with opposite UE assist     Ball on wall for shoulder flex     Elbow PROM     Side-lying flex     Table Slides     Wall slides      UE [place]     Pulleys 10 x 10 secs flex and abd    Pendulum hold/forward bow     BB IR     SL IR     Pec doorway stretch     CBA stretch     UT stretch     LS stretch     Isometrics     Retraction 10 x 10 secs         Weight shift     Flexion 10 x 10 secs    Abduction 10 x 10 secs    extn 10 x 10 secs In neutral   External Rotation 10 x 10 secs    Internal Rotation     Biceps     Triceps      X 50 blue    PRE's     Flexion     Abduction     External Rotation     Internal Rotation     Shrugs 3 x 10 2 lbs    EXT     Reverse Flys     Serratus     Horizontal Abd with ER     Biceps 3 x 10 2 lbs     Triceps     Retraction     Wrist flex and extn 3 x 10 2 lbs each     Cable Column/Theraband     External Rotation     Internal Rotation     Shrugs     Lats     Ext     Flex     Scapular Retraction     BIC     TRIC 3 x 10 blue TB    PNF          Dynamic Stability     UK flex X 5 mins supine 130 degree arc    Plyoback            Patient education: Pt was educated on PT diagnosis, prognosis, and plan of care. Pt was educated on the use of ice throughout the day. Pt was educated on signs/symptoms of infection and to call with any questions or concerns. Pt educated on post-op precautions and restrictions. Therapeutic Exercise and NMR EXR  [x] (29301) Provided verbal/tactile cueing for activities related to strengthening, flexibility, endurance, ROM  for improvements in scapular, scapulothoracic and UE control with self care, reaching, carrying, lifting, house/yardwork, driving/computer work.    [] (38178) Provided verbal/tactile cueing for activities related to improving balance, coordination, kinesthetic sense, posture, motor skill, proprioception  to assist with  scapular, scapulothoracic and UE control with self care, reaching, carrying, lifting, house/yardwork, driving/computer work. Therapeutic Activities:    [x] (00738 or 88875) Provided verbal/tactile cueing for activities related to improving balance, coordination, kinesthetic sense, posture, motor skill, proprioception and motor activation to allow for proper function of scapular, scapulothoracic and UE control with self care, carrying, lifting, driving/computer work. Home Exercise Program:  Pt was instructed in, and safely and correctly demonstrated home exercise program. Patient verbalized understanding of proper frequency of exercises. Copy of exercises was scanned into patient chart and can be found in the media file.     [x] (59046) Reviewed/Progressed HEP activities related to strengthening, flexibility, endurance, ROM of scapular, scapulothoracic and UE control with self care, reaching, carrying, lifting, house/yardwork, driving/computer

## 2019-07-26 ENCOUNTER — HOSPITAL ENCOUNTER (OUTPATIENT)
Dept: PHYSICAL THERAPY | Age: 59
Setting detail: THERAPIES SERIES
Discharge: HOME | End: 2019-07-26
Payer: COMMERCIAL

## 2019-07-26 PROCEDURE — 97110 THERAPEUTIC EXERCISES: CPT | Performed by: PHYSICAL THERAPIST

## 2019-07-26 PROCEDURE — 97530 THERAPEUTIC ACTIVITIES: CPT | Performed by: PHYSICAL THERAPIST

## 2019-07-26 PROCEDURE — 97112 NEUROMUSCULAR REEDUCATION: CPT | Performed by: PHYSICAL THERAPIST

## 2019-07-26 NOTE — FLOWSHEET NOTE
indicated by patients Functional Deficits. 3. Patient will demonstrate an increase in L shoulder strength to 4/5 flex, abd, and ER, 4+/5 IR to allow for proper functional mobility as indicated by patients Functional Deficits. 4. Patient will return to ADLs above shoulder height without increased symptoms or restriction. 5. Pt will return to home management with normal use of L UE without pain or limitation. Progression Towards Functional goals:  [] Patient is progressing as expected towards functional goals listed. [] Progression is slowed due to complexities listed. [] Progression has been slowed due to co-morbidities. [x] Plan just implemented, too soon to assess goals progression  [] Other:     ASSESSMENT:  See eval    Treatment/Activity Tolerance:  [x] Patient tolerated treatment well [] Patient limited by fatique  [] Patient limited by pain  [] Patient limited by other medical complications  [x] Other: Pt tolerated IR isometric well today. She was a little tight anterior shoulder with TB scap retraction and emphasized getting true scapular retraction and not shoulder extn with these. Since pts flex and abd motion are very good, held on pulleys today. She continues to be tight in ER motion so did add in supine stretch for this also. She will continue to be progressed as tolerated and per protocol.      Prognosis: [x] Good [] Fair  [] Poor    Patient Requires Follow-up: [x] Yes  [] No    PLAN: See eval; consider:  SL IR stretch as needed (not past 50 degrees); progress to standing scaption  [x] Continue per plan of care [] Alter current plan (see comments)  [] Plan of care initiated [] Hold pending MD visit [] Discharge    Electronically signed by: Nubia Cam PT, DPT

## 2019-07-29 ENCOUNTER — HOSPITAL ENCOUNTER (OUTPATIENT)
Dept: PHYSICAL THERAPY | Age: 59
Setting detail: THERAPIES SERIES
Discharge: HOME | End: 2019-07-29
Payer: COMMERCIAL

## 2019-07-29 PROCEDURE — 97110 THERAPEUTIC EXERCISES: CPT | Performed by: PHYSICAL THERAPIST

## 2019-07-29 PROCEDURE — 97112 NEUROMUSCULAR REEDUCATION: CPT | Performed by: PHYSICAL THERAPIST

## 2019-07-29 PROCEDURE — 97530 THERAPEUTIC ACTIVITIES: CPT | Performed by: PHYSICAL THERAPIST

## 2019-08-01 ENCOUNTER — OFFICE VISIT (OUTPATIENT)
Dept: ORTHOPEDIC SURGERY | Age: 59
End: 2019-08-01

## 2019-08-01 VITALS — HEIGHT: 64 IN | BODY MASS INDEX: 37.9 KG/M2 | WEIGHT: 222 LBS | TEMPERATURE: 97.7 F

## 2019-08-01 DIAGNOSIS — Z96.612 S/P REVERSE TOTAL SHOULDER ARTHROPLASTY, LEFT: Primary | ICD-10-CM

## 2019-08-01 PROCEDURE — 99024 POSTOP FOLLOW-UP VISIT: CPT | Performed by: PHYSICIAN ASSISTANT

## 2019-08-02 ENCOUNTER — APPOINTMENT (OUTPATIENT)
Dept: PHYSICAL THERAPY | Age: 59
End: 2019-08-02
Payer: COMMERCIAL

## 2019-08-02 ENCOUNTER — HOSPITAL ENCOUNTER (OUTPATIENT)
Dept: PHYSICAL THERAPY | Age: 59
Setting detail: THERAPIES SERIES
Discharge: HOME | End: 2019-08-02
Payer: COMMERCIAL

## 2019-08-02 PROCEDURE — 97110 THERAPEUTIC EXERCISES: CPT | Performed by: PHYSICAL THERAPIST

## 2019-08-02 PROCEDURE — 97530 THERAPEUTIC ACTIVITIES: CPT | Performed by: PHYSICAL THERAPIST

## 2019-08-02 PROCEDURE — 97112 NEUROMUSCULAR REEDUCATION: CPT | Performed by: PHYSICAL THERAPIST

## 2019-08-02 NOTE — FLOWSHEET NOTE
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- 30 visits, no auth, only 3 codes billable per visit, used 1 visit  Physician Information:  Referring Practitioner: Bossman Choi MD  Plan of care signed (Y/N): Y    Date of Patient follow up with Physician: 19    Progress Note: [x]  Yes  []  No  Next due by: Visit #10      Latex Allergy:  [x]NO      []YES  Preferred Language for Healthcare:   [x]English       []other:    Visit # Insurance Allowable   6 29     Pain level:  0/10 On 2019    SUBJECTIVE:  Pt is 6 weeks s/p surgery on 19. Pt went to see PA and said everything looks good and can finish scheduled PT and do HEP on her own. She does get sore after she exercises but does put ice on it.       OBJECTIVE: See eval  Observation:   Test measurements:                ROM PROM-  AROM  Comment     L- 19 R L R     Flexion     150 167     Abduction     170 in some scaption 183     ER   40 at side supine    33 at side       IR      BB to T 9      Other             Other                    Strength- deferred due to recent surgery L R Comment   Flexion         Abduction         ER         IR         Supraspinatus         Upper Trap         Lower Trap         Mid Trap         Rhomboids         Biceps         Triceps         Horizontal Abduction         Horizontal Adduction         Lats            Orthopedic Special Tests: deferred  Special Tests Left Right   [place] flexibility, endurance, ROM of scapular, scapulothoracic and UE control with self care, reaching, carrying, lifting, house/yardwork, driving/computer work  [] (33268) Reviewed/Progressed HEP activities related to improving balance, coordination, kinesthetic sense, posture, motor skill, proprioception of scapular, scapulothoracic and UE control with self care, reaching, carrying, lifting, house/yardwork, driving/computer work      Manual Treatments:  PROM / STM / Oscillations-Mobs:  G-I, II, III, IV (PA's, Inf., Post.)  [] (09114) Provided manual therapy to mobilize soft tissue/joints of cervical/CT, scapular GHJ and UE for the purpose of modulating pain, promoting relaxation,  increasing ROM, reducing/eliminating soft tissue swelling/inflammation/restriction, improving soft tissue extensibility and allowing for proper ROM for normal function with self care, reaching, carrying, lifting, house/yardwork, driving/computer work    Modalities: declined ice    Charges:  Timed Code Treatment Minutes: Total Treatment Minutes:      **Only 3 codes billable per visit per insurance**  [] EVAL (LOW) 455 1011   [] EVAL (MOD) 22855   [] EVAL (HIGH) 423 8935   [] RE-EVAL   [x] EH(59287) x  1   [] IONTO  [x] NMR (21224) x  1   [] VASO  [] Manual (71809) x       [] Other:  [x] TA x  1    [] Mech Traction (08122)  [] ES(attended) (37213)      [] ES (un) (97528):     GOALS:Short Term Goals: To be achieved in: 1 weeks  1. Independent in HEP and progression per patient tolerance, in order to prevent re-injury. 2. Patient will have a decrease in pain to 0/10 to facilitate improvement in movement, function, and ADLs as indicated by Functional Deficits.        Long Term Goals: To be achieved in: 4-5 months  1. Disability index score of 85% or more for the UEFI to assist with reaching prior level of function.    2. Patient will demonstrate increased shoulder AROM to 150 flex, 140 abd, 40 ER at 90 abd to allow for proper joint functioning as

## 2019-08-06 ENCOUNTER — HOSPITAL ENCOUNTER (OUTPATIENT)
Dept: PHYSICAL THERAPY | Age: 59
Setting detail: THERAPIES SERIES
Discharge: HOME | End: 2019-08-06
Payer: COMMERCIAL

## 2019-08-06 PROCEDURE — 97112 NEUROMUSCULAR REEDUCATION: CPT | Performed by: PHYSICAL THERAPIST

## 2019-08-06 PROCEDURE — 97530 THERAPEUTIC ACTIVITIES: CPT | Performed by: PHYSICAL THERAPIST

## 2019-08-06 PROCEDURE — 97110 THERAPEUTIC EXERCISES: CPT | Performed by: PHYSICAL THERAPIST

## 2019-08-06 NOTE — FLOWSHEET NOTE
External Rotation Attempted but too weak to perform    Internal Rotation 3 x 10 orange TB    Shrugs     Lats     Ext     Flex     Scapular Retraction 3 x 10 black TB    BIC     TRIC 3 x 10 blue TB    PNF          Dynamic Stability     UK flex X 5 mins supine 140 degree arc, 2 lb    Plyoback            Patient education: Pt was educated on PT diagnosis, prognosis, and plan of care. Pt was educated on the use of ice throughout the day. Pt was educated on signs/symptoms of infection and to call with any questions or concerns. Pt educated on post-op precautions and restrictions. Therapeutic Exercise and NMR EXR  [x] (50199) Provided verbal/tactile cueing for activities related to strengthening, flexibility, endurance, ROM  for improvements in scapular, scapulothoracic and UE control with self care, reaching, carrying, lifting, house/yardwork, driving/computer work.    [] (83634) Provided verbal/tactile cueing for activities related to improving balance, coordination, kinesthetic sense, posture, motor skill, proprioception  to assist with  scapular, scapulothoracic and UE control with self care, reaching, carrying, lifting, house/yardwork, driving/computer work. Therapeutic Activities:    [x] (57635 or 06359) Provided verbal/tactile cueing for activities related to improving balance, coordination, kinesthetic sense, posture, motor skill, proprioception and motor activation to allow for proper function of scapular, scapulothoracic and UE control with self care, carrying, lifting, driving/computer work. Home Exercise Program:  Pt was instructed in, and safely and correctly demonstrated home exercise program. Patient verbalized understanding of proper frequency of exercises. Copy of exercises was scanned into patient chart and can be found in the media file.     [x] (59372) Reviewed/Progressed HEP activities related to strengthening, flexibility, endurance, ROM of scapular, scapulothoracic and UE control demonstrate an increase in L shoulder strength to 4/5 flex, abd, and ER, 4+/5 IR to allow for proper functional mobility as indicated by patients Functional Deficits. 4. Patient will return to ADLs above shoulder height without increased symptoms or restriction. 5. Pt will return to home management with normal use of L UE without pain or limitation. Progression Towards Functional goals:  [] Patient is progressing as expected towards functional goals listed. [] Progression is slowed due to complexities listed. [] Progression has been slowed due to co-morbidities. [x] Plan just implemented, too soon to assess goals progression  [] Other:     ASSESSMENT:  See eval    Treatment/Activity Tolerance:  [x] Patient tolerated treatment well [] Patient limited by fatique  [] Patient limited by pain  [] Patient limited by other medical complications  [x] Other: Pt did well this session. She was able to increase weight on standing scaption and for one set of bicep curls. She was fatigued by these increases. She will continue to be progressed as tolerated.      Prognosis: [x] Good [] Fair  [] Poor    Patient Requires Follow-up: [x] Yes  [] No    PLAN: See eval; consider ball on wall  [x] Continue per plan of care [] Alter current plan (see comments)  [] Plan of care initiated [] Hold pending MD visit [] Discharge    Electronically signed by: Brandon Bailey PT, DPT

## 2019-08-08 NOTE — PROGRESS NOTES
This dictation was done with "[employer]on dictation and may contain mechanical errors related to translation. Temperature 97.7 °F (36.5 °C), temperature source Temporal, height 5' 4\" (1.626 m), weight 222 lb (100.7 kg), last menstrual period 09/08/2012, not currently breastfeeding. This is a very pleasant 8year-old female who is doing extremely well after a left reverse total shoulder on 6/12/2019 she states that overall her pain is less than 1 out of 10 and she is going to be finishing physical therapy by the end of next week. Her incision is healed nicely she is neurologically intact to the left hand. She was sent for x-rays including a at the last visit and that she had an excellent alignment sizing and fit of the left reverse total shoulder. On examination she is able to touch the opposite shoulder of the opposite hip and above raise her arm above her head she is full range of motion she has fair strength she has good  strength good wrist extension strength no neurologic deficits. My impression is stable healing left reverse total shoulder. We talked about short and long-term expectations to continued use of exercises and the use of prophylactic antibiotics.   She will follow-up with us on a yearly basis

## 2019-08-09 ENCOUNTER — HOSPITAL ENCOUNTER (OUTPATIENT)
Dept: PHYSICAL THERAPY | Age: 59
Setting detail: THERAPIES SERIES
Discharge: HOME | End: 2019-08-09
Payer: COMMERCIAL

## 2019-08-09 PROCEDURE — 97530 THERAPEUTIC ACTIVITIES: CPT | Performed by: PHYSICAL THERAPIST

## 2019-08-09 PROCEDURE — 97110 THERAPEUTIC EXERCISES: CPT | Performed by: PHYSICAL THERAPIST

## 2019-08-09 PROCEDURE — 97112 NEUROMUSCULAR REEDUCATION: CPT | Performed by: PHYSICAL THERAPIST

## 2019-08-09 NOTE — FLOWSHEET NOTE
IR:  ER:  Cross Body:   Neer's       Full Can       Empty Can       [place]       Nerve Tension Testing       Speed's       Gonzalez's        Spurling's       Repeated Scaption                        Reflexes/Sensation (myotomes/dermatomes): reports full sensation throughout     Joint mobility: n/t              []Normal               []Hypo              []Hyper     Palpation: n/t     Functional Mobility/Transfers: Indep     Posture: no longer wearing sling and fair posture     Bandages/Dressings/Incisions: Incisions are clean, dry, and intact without signs of infection and healing well- pt has been putting Vitamin E oil on them        Functional assessment on 7/15/2019   52.5%      RESTRICTIONS/PRECAUTIONS: no shoulder motion behind back (combined adduction, IR, and extn); no GH extn beyond neutral for 12 weeks post op.  no supporting body-weight or lifting with objects    Exercises/Interventions:   Exercise/Equipment Resistance/Repetitions Other comments   Aerobic Conditioning     Aerodyne          Stretching/PROM     Cane ER 10 x 10 secs seated at side and supine at side    Wand flex     Towel press up     Passive shoulder flex with opposite UE assist     Ball on wall for shoulder flex     Elbow PROM     Side-lying flex     Table Slides     Wall slides      UE [place]     Pulleys     Pendulum hold/forward bow     BB IR     SL IR  Not past 50 degrees   Pec doorway stretch     CBA stretch     UT stretch     LS stretch     Isometrics     Retraction          Weight shift     Flexion    Abduction    extn In neutral   External Rotation    Internal Rotation    Biceps     Triceps          PRE's     Flexion 3 x 10 2 lb    Abduction 3 x 10 SL 2 lbs    External Rotation 3 x 10 SL    Internal Rotation     Shrugs 3 x 10 3 lbs    EXT     Reverse Flys     Serratus 3 x 10 3 lbs    Horizontal Abd with ER     Biceps 3 x 10 3  lbs     Triceps     Retraction     Wrist extn  Held on flex due to aggravating wrist arthritis Cable Column/Theraband     External Rotation Attempted but too weak to perform    Internal Rotation 3 x 10 orange TB    Shrugs     Lats     Ext     Flex     Scapular Retraction 3 x 10 black TB    BIC     TRIC 3 x 10 blue TB    PNF          Dynamic Stability     UK flex X 5 mins supine 140 degree arc, 2 lb    Plyoback     [place] stacking     Ball on wall Up/down and lateral x 30 kickball      Patient education: Pt was educated on PT diagnosis, prognosis, and plan of care. Pt was educated on the use of ice throughout the day. Pt was educated on signs/symptoms of infection and to call with any questions or concerns. Pt educated on post-op precautions and restrictions. Therapeutic Exercise and NMR EXR  [x] (81846) Provided verbal/tactile cueing for activities related to strengthening, flexibility, endurance, ROM  for improvements in scapular, scapulothoracic and UE control with self care, reaching, carrying, lifting, house/yardwork, driving/computer work.    [] (92754) Provided verbal/tactile cueing for activities related to improving balance, coordination, kinesthetic sense, posture, motor skill, proprioception  to assist with  scapular, scapulothoracic and UE control with self care, reaching, carrying, lifting, house/yardwork, driving/computer work. Therapeutic Activities:    [x] (16250 or 26620) Provided verbal/tactile cueing for activities related to improving balance, coordination, kinesthetic sense, posture, motor skill, proprioception and motor activation to allow for proper function of scapular, scapulothoracic and UE control with self care, carrying, lifting, driving/computer work. Home Exercise Program:  Pt was instructed in, and safely and correctly demonstrated home exercise program. Patient verbalized understanding of proper frequency of exercises. Copy of exercises was scanned into patient chart and can be found in the media file.     [x] (52556) Reviewed/Progressed HEP activities related joint functioning as indicated by patients Functional Deficits. 3. Patient will demonstrate an increase in L shoulder strength to 4/5 flex, abd, and ER, 4+/5 IR to allow for proper functional mobility as indicated by patients Functional Deficits. 4. Patient will return to ADLs above shoulder height without increased symptoms or restriction. 5. Pt will return to home management with normal use of L UE without pain or limitation. Progression Towards Functional goals:  [] Patient is progressing as expected towards functional goals listed. [] Progression is slowed due to complexities listed. [] Progression has been slowed due to co-morbidities. [x] Plan just implemented, too soon to assess goals progression  [] Other:     ASSESSMENT:  See eval    Treatment/Activity Tolerance:  [x] Patient tolerated treatment well [] Patient limited by fatique  [] Patient limited by pain  [] Patient limited by other medical complications  [x] Other:  Pt educated that she no longer needs to do IR stretch at 90 due to good motion here. She did improve with her ER motion with cane stretch today. Pt was able to use 3 lbs for all 3 sets of bicep curls. She tolerated all strengthening well today.      Prognosis: [x] Good [] Fair  [] Poor    Patient Requires Follow-up: [x] Yes  [] No    PLAN: See eval; consider more ball on wall and cone stacking; consider d/c to HEP  [x] Continue per plan of care [] Alter current plan (see comments)  [] Plan of care initiated [] Hold pending MD visit [] Discharge    Electronically signed by: Alona Hernandez PT, DPT

## 2019-08-12 ENCOUNTER — APPOINTMENT (OUTPATIENT)
Dept: PHYSICAL THERAPY | Age: 59
End: 2019-08-12
Payer: COMMERCIAL

## 2019-08-16 ENCOUNTER — HOSPITAL ENCOUNTER (OUTPATIENT)
Dept: PHYSICAL THERAPY | Age: 59
Setting detail: THERAPIES SERIES
Discharge: HOME | End: 2019-08-16
Payer: COMMERCIAL

## 2019-08-16 PROCEDURE — 97110 THERAPEUTIC EXERCISES: CPT | Performed by: PHYSICAL THERAPIST

## 2019-08-16 PROCEDURE — 97530 THERAPEUTIC ACTIVITIES: CPT | Performed by: PHYSICAL THERAPIST

## 2019-08-16 PROCEDURE — 97112 NEUROMUSCULAR REEDUCATION: CPT | Performed by: PHYSICAL THERAPIST

## 2019-08-16 NOTE — PLAN OF CARE
will return to ADLs above shoulder height without increased symptoms or restriction. - MET  5. Pt will return to home management with normal use of L UE without pain or limitation. - Progressing, Mostly Met  Progression Towards Functional goals:  [x] Patient is progressing as expected towards functional goals listed. [] Progression is slowed due to complexities listed. [] Progression has been slowed due to co-morbidities. [] Plan just implemented, too soon to assess goals progression  [] Other:     ASSESSMENT:  See eval    Treatment/Activity Tolerance:  [x] Patient tolerated treatment well [] Patient limited by fatique  [] Patient limited by pain  [] Patient limited by other medical complications  [x] Other:  Pt has progressed very well in therapy overall. She shows good AROM of her shoulder and pt was educated to wait till she is 3 months out to start reaching behind her back again per protocol. Pt continues to have some weakness throughout her shoulder as expected for time frame post op but was educated on need to continue to do strengthening on her own with HEP to continue to work on this. Pt is overall feeling very well. She did have mild pain in shoulder with [place] flex so had to decrease pain but otherwise did well in session. Pt is now d/c to HEP.    Prognosis: [x] Good [] Fair  [] Poor    Patient Requires Follow-up: [] Yes  [x] No    PLAN:   [] Continue per plan of care [] Alter current plan (see comments)  [] Plan of care initiated [] Hold pending MD visit [x] Discharge    Electronically signed by: Beryl Irizarry PT, DPT

## 2019-12-12 ENCOUNTER — OFFICE VISIT (OUTPATIENT)
Dept: ORTHOPEDIC SURGERY | Age: 59
End: 2019-12-12
Payer: COMMERCIAL

## 2019-12-12 VITALS
HEIGHT: 64 IN | SYSTOLIC BLOOD PRESSURE: 100 MMHG | DIASTOLIC BLOOD PRESSURE: 63 MMHG | RESPIRATION RATE: 16 BRPM | TEMPERATURE: 97.6 F | HEART RATE: 69 BPM | BODY MASS INDEX: 38.11 KG/M2

## 2019-12-12 DIAGNOSIS — Z96.651 H/O TOTAL KNEE REPLACEMENT, RIGHT: ICD-10-CM

## 2019-12-12 DIAGNOSIS — Z96.612 S/P REVERSE TOTAL SHOULDER ARTHROPLASTY, LEFT: Primary | ICD-10-CM

## 2019-12-12 DIAGNOSIS — Z96.652 STATUS POST TOTAL LEFT KNEE REPLACEMENT: ICD-10-CM

## 2019-12-12 PROCEDURE — 99213 OFFICE O/P EST LOW 20 MIN: CPT | Performed by: PHYSICIAN ASSISTANT

## 2020-06-29 ENCOUNTER — TELEPHONE (OUTPATIENT)
Dept: ORTHOPEDIC SURGERY | Age: 60
End: 2020-06-29

## 2020-06-29 NOTE — TELEPHONE ENCOUNTER
Pt called; has questions about an upcoming dentist appt on 7/13/20 and a Rx she has to take. Please advise.     Call [phone]

## 2020-08-03 ENCOUNTER — OFFICE VISIT (OUTPATIENT)
Dept: ORTHOPEDIC SURGERY | Age: 60
End: 2020-08-03
Payer: COMMERCIAL

## 2020-08-03 VITALS — TEMPERATURE: 97.9 F | BODY MASS INDEX: 35.65 KG/M2 | RESPIRATION RATE: 16 BRPM | HEIGHT: 65 IN | WEIGHT: 214 LBS

## 2020-08-03 PROCEDURE — 99213 OFFICE O/P EST LOW 20 MIN: CPT | Performed by: PHYSICIAN ASSISTANT

## 2020-08-08 NOTE — PROGRESS NOTES
This dictation was done with Hel[employer]ne dictation and may contain mechanical errors related to translation. The review of systems was currently provided by the patient and reviewed with the medical assistant at today's visit. Please see media. Subjective:  Dewitt Osgood is a 62 y.o. who is here in follow-up for her left reverse shoulder from June 2019 her right total knee replacement from September 2017 and her left knee replacement from September 2016. Overall she states she has 0-10 pain in has done extremely well and proud of her. She has.   She currently is in a boot because she has had surgery for her left ankle but she has been sent for x-rays including an AP transaxillary view and Y-view of the left shoulder as well as an AP lateral sunrise view of the left and the right knee overall strength incisions have healed nicely and her overall function and disability      Patient Active Problem List   Diagnosis    HTN (hypertension)    Hypercholesteremia    Insomnia-on nightly ambien    Obesity-encouraged wt loss--will follow    Prothrombin gene ywchzzrg-bwkncurndnsk-ersz dr Cruzito Garcia if needed    Palpitations-advised to avoid caffiene    Climacteric--on zoloft for sx-(sees dr Vannessa Leyva pap-9/11  last mammo-1/15-dexa 7/12 --[facility]    Sciatica-s/p epidural steroid injections 2010    S/P colonoscopy-done 2004--wnl    Hyperglycemia    Recurrent UTI-sees dr Saranya Bryant for this--urology    Rosacea--sees dr Glennette Lanes [place]in Colon polyp--9/14--dr noonan    Arthritis of left knee    Status post total left knee replacement    Right knee pain    Arthritis of right knee    Arthritis of left shoulder region    Rotator cuff tendonitis, left    Other specified arthritis, left shoulder    Biceps tendonitis, left    S/P reverse total shoulder arthroplasty, 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 Negative Mccullough's sign. Skin is warm, dry and intact with out erythema or significant increased temperature around the knee joint(s). There are no cutaneous lesions or lymphadenopathy present. X-RAYS:  Xray three views of the right and left total knee arthroplasty reveals satisfactory alignment of the prosthesis . No signs of significant polyethylene wear or failure. No progressive radiolucencies,fractures, tumors or dislocations. The AP transaxillary view and Y view show excellent alignment sizing and fit of the left reverse total shoulder with no lucencies good screw fixation into the glenoid or any other bony abnormalities. Assessment:  Stable bilateral total knee replacements and stable left reverse total shoulder    Plan:  During today's visit, there was approximately 17 minutes of face-to-face discussion in regards to the patient's current condition and treatment options. More than 50 % of the time was counseling and coordination of care.       We talked about short and long-term expectations and the continued need for antibiotics prophylactically and exercises      PROCEDURE NOTE:   Discussed x-rays and status      They will schedule a follow up in 1 year

## 2021-08-09 ENCOUNTER — OFFICE VISIT (OUTPATIENT)
Dept: ORTHOPEDIC SURGERY | Age: 61
End: 2021-08-09
Payer: COMMERCIAL

## 2021-08-09 VITALS — WEIGHT: 224 LBS | BODY MASS INDEX: 37.32 KG/M2 | RESPIRATION RATE: 16 BRPM | HEIGHT: 65 IN

## 2021-08-09 DIAGNOSIS — Z96.653 HISTORY OF TOTAL KNEE ARTHROPLASTY, BILATERAL: ICD-10-CM

## 2021-08-09 DIAGNOSIS — Z96.612 S/P REVERSE TOTAL SHOULDER ARTHROPLASTY, LEFT: Primary | ICD-10-CM

## 2021-08-09 PROCEDURE — G8427 DOCREV CUR MEDS BY ELIG CLIN: HCPCS | Performed by: ORTHOPAEDIC SURGERY

## 2021-08-09 PROCEDURE — G8417 CALC BMI ABV UP PARAM F/U: HCPCS | Performed by: ORTHOPAEDIC SURGERY

## 2021-08-09 PROCEDURE — 3017F COLORECTAL CA SCREEN DOC REV: CPT | Performed by: ORTHOPAEDIC SURGERY

## 2021-08-09 PROCEDURE — 1036F TOBACCO NON-USER: CPT | Performed by: ORTHOPAEDIC SURGERY

## 2021-08-09 PROCEDURE — 99213 OFFICE O/P EST LOW 20 MIN: CPT | Performed by: ORTHOPAEDIC SURGERY

## 2021-08-09 NOTE — PROGRESS NOTES
[facility]  Office Visit    Chief Complaint: Follow-up s/p bilateral total knee arthroplasty, reverse left total shoulder arthroplasty    HPI:  Oseas Sears is a 56 y.o. who is here in follow-up of bilateral total knee arthroplasty and left reverse total shoulder arthroplasty. These were all done with Dr. Meryle Ellison. Left knee was 2016, right knee 2017, left shoulder 2019. She is doing very well with all of these and is not having any issues. She is here today for annual follow-up. She walks without assistive device. Patient Active Problem List   Diagnosis    HTN (hypertension)    Hypercholesteremia    Insomnia-on nightly ambien    Obesity-encouraged wt loss--will follow    Prothrombin gene ghbtdelr-tqpuqbwxbfwh-kfzp dr Mark Bland if needed    Palpitations-advised to avoid caffiene    Climacteric--on zoloft for sx-(sees dr Phipps Led pap-9/11  last mammo-1/15-dexa 7/12 --[facility]    Sciatica-s/p epidural steroid injections 2010    S/P colonoscopy-done 2004--[facility]l    Hyperglycemia    Recurrent UTI-sees dr Ashleigh Conklin for this--urology    Rosacea--sees dr Hugo Webb Me Colon polyp--9/14--dr noonan    Arthritis of left knee    Status post total left knee replacement    Right knee pain    Arthritis of right knee    Arthritis of left shoulder region    Rotator cuff tendonitis, left    Other specified arthritis, left shoulder    Biceps tendonitis, left    S/P reverse total shoulder arthroplasty, 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 rate 16, height 5' 5\" (1.651 m), weight 224 lb (101.6 kg), last menstrual period 09/08/2012, not currently breastfeeding.     Appearance: sitting in exam room chair, appears to be in no acute distress, awake and alert  Resp: unlabored breathing on room air  Skin: warm, dry and intact with obtained by medical assistant, performing history and physical exam, reviewing tests/radiographs with the patient, counseling the patient, ordering medications or tests, documentation in the electronic health record, and coordination of care. This dictation was done with Dragon dictation and may contain mechanical errors related to translation.

## 2022-01-06 ENCOUNTER — OFFICE VISIT (OUTPATIENT)
Dept: ORTHOPEDIC SURGERY | Age: 62
End: 2022-01-06
Payer: COMMERCIAL

## 2022-01-06 ENCOUNTER — TELEPHONE (OUTPATIENT)
Dept: ORTHOPEDIC SURGERY | Age: 62
End: 2022-01-06

## 2022-01-06 VITALS — WEIGHT: 224 LBS | HEIGHT: 64 IN | BODY MASS INDEX: 38.24 KG/M2

## 2022-01-06 DIAGNOSIS — S43.005A DISLOCATION OF LEFT SHOULDER JOINT, INITIAL ENCOUNTER: ICD-10-CM

## 2022-01-06 DIAGNOSIS — M25.512 LEFT SHOULDER PAIN, UNSPECIFIED CHRONICITY: Primary | ICD-10-CM

## 2022-01-06 PROCEDURE — L3660 SO 8 AB RSTR CAN/WEB PRE OTS: HCPCS | Performed by: PHYSICIAN ASSISTANT

## 2022-01-06 PROCEDURE — G8484 FLU IMMUNIZE NO ADMIN: HCPCS | Performed by: PHYSICIAN ASSISTANT

## 2022-01-06 PROCEDURE — G8427 DOCREV CUR MEDS BY ELIG CLIN: HCPCS | Performed by: PHYSICIAN ASSISTANT

## 2022-01-06 PROCEDURE — G8417 CALC BMI ABV UP PARAM F/U: HCPCS | Performed by: PHYSICIAN ASSISTANT

## 2022-01-06 PROCEDURE — 3017F COLORECTAL CA SCREEN DOC REV: CPT | Performed by: PHYSICIAN ASSISTANT

## 2022-01-06 PROCEDURE — 99213 OFFICE O/P EST LOW 20 MIN: CPT | Performed by: PHYSICIAN ASSISTANT

## 2022-01-06 PROCEDURE — 1036F TOBACCO NON-USER: CPT | Performed by: PHYSICIAN ASSISTANT

## 2022-01-06 PROCEDURE — 23650 CLTX SHO DSLC W/MNPJ WO ANES: CPT | Performed by: PHYSICIAN ASSISTANT

## 2022-01-06 NOTE — TELEPHONE ENCOUNTER
General Question     Subject: PATIENT WAS SCHEDULED FOR THE 1:45 APPOINTMENT TODAY. SHE STATED SHE WAS GOING TO COME TO THE OFFICE AND WAIT FOR SOMEONE TO SEE HER.     Patient:  [facility] Number: [phone]

## 2022-01-06 NOTE — TELEPHONE ENCOUNTER
General Question     Subject: PATIENT STATES HER LEFT SHOULDER (REVERSE SHOULDER SX 2 OR 3 YRS. AGO). SHE IS HAVING A LOT OF PAIN RIGHT NOW AND WOULD LIKE TO BE SEEN ASAP. I ORDERED A 1:45 APPOINTMENT, BUT SHE WOULD LIKE TO BE SEEN SOONER. PLEASE ADVISE.     Patient:  Didier Plush Number: [phone]

## 2022-01-10 NOTE — PROGRESS NOTES
This dictation was done with [employer] dictation and may contain mechanical errors related to translation. I have today reviewed with Brielle Roblero the clinically relevant, past medical history, medications, allergies, family history, social history, and Review Of Systems form the patients most recent history form & I have documented any details relevant to today's presenting complaints in my history below. Ms. Brandy Francisco's self-reported past medical history, medications, allergies, family history, social history, and Review Of Systems form has been scanned into the chart under the \"Media\" tab. Subjective:  Brielle Roblero is a 59 y.o. who is here due to a recent dislocation. She had a reverse total shoulder done on 6/12/2019. She is actually done fairly well up until about an hour ago. She reached out with her purse on her shoulder at a grocery store and felt her shoulder dislocate. She came into the office and this was confirmed under x-ray. At this visit the X-rays, presenting symptoms, and chief complaint were presented to Charo Brandt MD.  He then evaluated the patient. He spent several minutes discussing face to face with the patient the clinical diagnosis and medical course options,from conservative to aggressive including risks and benefits. At this point Dr. Vi Santacruz and I talked to the patient explained that we would try to do a reduction in the office. This was successful and she was sent to x-ray to confirm it.       Patient Active Problem List   Diagnosis    HTN (hypertension)    Hypercholesteremia    Insomnia-on nightly ambien    Obesity-encouraged wt loss--will follow    Prothrombin gene rrmeintn-ijyxspnynccc-xzju dr Kayleigh Carmona if needed    Palpitations-advised to avoid caffiene    Climacteric--on zoloft for sx-(sees dr Isabel Reason pap-9/11  last mammo-1/15-dexa 7/12 --[facility]    Sciatica-s/p epidural steroid injections 2010    S/P colonoscopy-done 2004--wnl    Hyperglycemia    Recurrent UTI-sees dr Pardeep Anderson for this--urology    Rosacea--sees dr Parker Ohs polyp--9/14--dr noonan    Arthritis of left knee    Status post total left knee replacement    Right knee pain    Arthritis of right knee    Arthritis of left shoulder region    Rotator cuff tendonitis, left    Other specified arthritis, left shoulder    Biceps tendonitis, left    S/P reverse total shoulder arthroplasty, 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           Current Outpatient Medications on File Prior to Visit   Medication Sig Dispense Refill    LOSARTAN POTASSIUM PO Take 100 mg by mouth      aspirin 325 MG EC tablet Take 1 tablet by mouth every 12 hours 30 tablet 3    azelastine (ASTELIN) 0.1 % nasal spray 1 spray by Nasal route 2 times daily Use in each nostril as directed      Misc Natural Products (GLUCOSAMINE CHONDROITIN TRIPLE PO) Take 1 capsule by mouth 2 times daily      Ginkgo Biloba (GINKOBA PO) Take 1 tablet by mouth nightly      diclofenac sodium 1 % GEL Apply 2 g topically as needed for Pain      clindamycin (CLEOCIN) 300 MG capsule 1 tablet by mouth 1 hour before and 1 hour after procedure 4 capsule 0    metoprolol succinate (TOPROL XL) 100 MG extended release tablet Take 100 mg by mouth daily      famotidine (PEPCID) 40 MG tablet Take 40 mg by mouth nightly as needed       tazarotene (TAZORAC) 0.1 % cream Apply topically nightly Apply topically nightly.  Multiple Vitamins-Minerals (OCUVITE ADULT 50+) CAPS Take by mouth      vitamin B-12 (CYANOCOBALAMIN) 250 MCG tablet Take 500 mcg by mouth daily      lisinopril (PRINIVIL;ZESTRIL) 40 MG tablet TAKE 1 TABLET BY MOUTH DAILY 30 tablet 4    sertraline (ZOLOFT) 100 MG tablet Take 100 mg by mouth daily. No current facility-administered medications on file prior to visit. Objective:   Height 5' 4\" (1.626 m), weight 224 lb (101.6 kg), last menstrual period 09/08/2012, not currently breastfeeding. Is alert and oriented x3.   After application of this item were provided. They were instructed to contact the office immediately should the brace result in increased pain, decreased sensation, increased swelling or worsening of the condition.            They will schedule a follow up in 4 weeks

## 2022-03-11 RX ORDER — AZELAIC ACID 0.15 G/G
GEL TOPICAL SEE ADMIN INSTRUCTIONS
COMMUNITY

## 2022-03-11 RX ORDER — B-COMPLEX WITH VITAMIN C
1 TABLET ORAL EVERY OTHER DAY
COMMUNITY

## 2022-03-11 NOTE — PROGRESS NOTES
[facility] ENDOSCOPY COLONOSCOPY PRE-OPERATIVE INSTRUCTIONS    Procedure date 03/16/22_________  Arrival time_0630__________          Surgery time______0730______       Clear liquids the day before the procedure. Do not eat or drink anything within 5 hours of your procedure. This includes water chewing gum, mints and ice chips. You may brush your teeth and gargle the morning of your surgery, but do not swallow the water    You may be asked to stop blood thinners such as Coumadin, Plavix, Fragmin, Lovenox, etc., or any anti-inflammatories such as:  Aspirin, Ibuprofen, Advil, Naproxen prior to your procedure. We also ask that you stop any OTC medications such as fish oil, vitamin E, glucosamine, garlic, Multivitamins, COQ 10, etc.    You must make arrangements for a responsible adult to arrive with you and stay in our waiting area during your procedure. They will also need to take you home after your procedure. For your safety you will not be allowed to leave alone or drive yourself home. Also for your safety, it is strongly suggested that someone stay with you the first 24 hours after your procedure. For your comfort, please wear simple loose fitting clothing to the center. Please do not bring valuables. If you have a living will and a durable power of  for healthcare, please bring in a copy. You will need to bring a photo ID and insurance card    Our goal is to provide you with excellent care so if you have any questions, please contact us at the [facility] at [phone]         Please note these are generalized instructions for all colonoscopy cases, you may be provided with more specific instructions if necessaryPreoperative Screening for Elective Surgery/Invasive Procedures While COVID-19 present in the community     Have you had any of the following symptoms?   o Fever, chills  o Cough  o Shortness of breath  o Muscle aches/pain  o Diarrhea  o Abdominal pain, nausea, vomiting  o Loss or decrease in taste and / or smell   Risk of Exposure  o Have you recently been hospitalized for COVID-19 or flu-like illness, if so when?  o Recently diagnosed with COVID-19, if so when?  o Recently tested for COVID-19, if so when?  o Have you been in close contact with a person or family member who currently has or recently had COVID-23? If yes, when and in what context?  o Do you live with anybody who in the last 14 days has had fever, chills, shortness of breath, muscle aches, flu-like illness?  o Do you have any close contacts or family members who are currently in the hospital for COVID-19 or flu-like illness? If yes, assess recent close contact with this person. Indicate if the patient has a positive screen by answering yes to one or more of the above questions. Patients who test positive or screen positive prior to surgery or on the day of surgery should be evaluated in conjunction with the surgeon/proceduralist/anesthesiologist to determine the urgency of the procedure.      No to all questions above

## 2022-03-15 ENCOUNTER — ANESTHESIA EVENT (OUTPATIENT)
Dept: ENDOSCOPY | Age: 62
End: 2022-03-15
Payer: COMMERCIAL

## 2022-03-16 ENCOUNTER — HOSPITAL ENCOUNTER (OUTPATIENT)
Age: 62
Setting detail: OUTPATIENT SURGERY
Discharge: HOME | End: 2022-03-16
Attending: INTERNAL MEDICINE | Admitting: INTERNAL MEDICINE
Payer: COMMERCIAL

## 2022-03-16 ENCOUNTER — ANESTHESIA (OUTPATIENT)
Dept: ENDOSCOPY | Age: 62
End: 2022-03-16
Payer: COMMERCIAL

## 2022-03-16 VITALS
RESPIRATION RATE: 18 BRPM | HEIGHT: 65 IN | DIASTOLIC BLOOD PRESSURE: 69 MMHG | OXYGEN SATURATION: 98 % | TEMPERATURE: 97.3 F | WEIGHT: 213 LBS | HEART RATE: 68 BPM | BODY MASS INDEX: 35.49 KG/M2 | SYSTOLIC BLOOD PRESSURE: 132 MMHG

## 2022-03-16 VITALS — DIASTOLIC BLOOD PRESSURE: 81 MMHG | SYSTOLIC BLOOD PRESSURE: 108 MMHG | OXYGEN SATURATION: 96 %

## 2022-03-16 DIAGNOSIS — Z86.010 HISTORY OF COLON POLYPS: ICD-10-CM

## 2022-03-16 PROCEDURE — 7100000010 HC PHASE II RECOVERY - FIRST 15 MIN: Performed by: INTERNAL MEDICINE

## 2022-03-16 PROCEDURE — 6360000002 HC RX W HCPCS

## 2022-03-16 PROCEDURE — 3609010300 HC COLONOSCOPY W/BIOPSY SINGLE/MULTIPLE: Performed by: INTERNAL MEDICINE

## 2022-03-16 PROCEDURE — 2580000003 HC RX 258: Performed by: ANESTHESIOLOGY

## 2022-03-16 PROCEDURE — 2709999900 HC NON-CHARGEABLE SUPPLY: Performed by: INTERNAL MEDICINE

## 2022-03-16 PROCEDURE — 2500000003 HC RX 250 WO HCPCS

## 2022-03-16 PROCEDURE — 3700000001 HC ADD 15 MINUTES (ANESTHESIA): Performed by: INTERNAL MEDICINE

## 2022-03-16 PROCEDURE — 3700000000 HC ANESTHESIA ATTENDED CARE: Performed by: INTERNAL MEDICINE

## 2022-03-16 PROCEDURE — 88305 TISSUE EXAM BY PATHOLOGIST: CPT

## 2022-03-16 PROCEDURE — 7100000011 HC PHASE II RECOVERY - ADDTL 15 MIN: Performed by: INTERNAL MEDICINE

## 2022-03-16 RX ORDER — SODIUM CHLORIDE 0.9 % (FLUSH) 0.9 %
5-40 SYRINGE (ML) INJECTION EVERY 12 HOURS SCHEDULED
Status: DISCONTINUED | OUTPATIENT
Start: 2022-03-16 | End: 2022-03-16 | Stop reason: HOSPADM

## 2022-03-16 RX ORDER — SODIUM CHLORIDE 9 MG/ML
25 INJECTION, SOLUTION INTRAVENOUS PRN
Status: DISCONTINUED | OUTPATIENT
Start: 2022-03-16 | End: 2022-03-16 | Stop reason: HOSPADM

## 2022-03-16 RX ORDER — PROPOFOL 10 MG/ML
INJECTION, EMULSION INTRAVENOUS CONTINUOUS PRN
Status: DISCONTINUED | OUTPATIENT
Start: 2022-03-16 | End: 2022-03-16 | Stop reason: SDUPTHER

## 2022-03-16 RX ORDER — SODIUM CHLORIDE 0.9 % (FLUSH) 0.9 %
5-40 SYRINGE (ML) INJECTION PRN
Status: DISCONTINUED | OUTPATIENT
Start: 2022-03-16 | End: 2022-03-16 | Stop reason: HOSPADM

## 2022-03-16 RX ORDER — LIDOCAINE HYDROCHLORIDE 20 MG/ML
INJECTION, SOLUTION EPIDURAL; INFILTRATION; INTRACAUDAL; PERINEURAL PRN
Status: DISCONTINUED | OUTPATIENT
Start: 2022-03-16 | End: 2022-03-16 | Stop reason: SDUPTHER

## 2022-03-16 RX ADMIN — PROPOFOL 150 MCG/KG/MIN: 10 INJECTION, EMULSION INTRAVENOUS at 07:48

## 2022-03-16 RX ADMIN — LIDOCAINE HYDROCHLORIDE 100 MG: 20 INJECTION, SOLUTION EPIDURAL; INFILTRATION; INTRACAUDAL; PERINEURAL at 07:48

## 2022-03-16 RX ADMIN — SODIUM CHLORIDE: 9 INJECTION, SOLUTION INTRAVENOUS at 07:35

## 2022-03-16 ASSESSMENT — PAIN SCALES - GENERAL
PAINLEVEL_OUTOF10: 0

## 2022-03-16 ASSESSMENT — LIFESTYLE VARIABLES: SMOKING_STATUS: 0

## 2022-03-16 ASSESSMENT — PAIN - FUNCTIONAL ASSESSMENT: PAIN_FUNCTIONAL_ASSESSMENT: 0-10

## 2022-03-16 NOTE — ANESTHESIA PRE PROCEDURE
Department of Anesthesiology  Preprocedure Note       Name:  Siddhartha Causey   Age:  59 y.o.  :  1960                                          MRN:  [id number]         Date:  3/16/2022      Surgeon: Kirsten Rivera):  Janay Mcclure MD    Procedure: Procedure(s):  COLONOSCOPY DIAGNOSTIC    Medications prior to admission:   Prior to Admission medications    Medication Sig Start Date End Date Taking? Authorizing Provider   Azelaic Acid 15 % GEL Apply topically See Admin Instructions Twice a day   Yes Historical Provider, MD   Zinc 100 MG TABS Take 1 tablet by mouth every other day   Yes Historical Provider, MD   Probiotic Product (PROBIOTIC ADVANCED PO) Take by mouth   Yes Historical Provider, MD   LOSARTAN POTASSIUM PO Take 100 mg by mouth   Yes Historical Provider, MD   azelastine (ASTELIN) 0.1 % nasal spray 1 spray by Nasal route 2 times daily Use in each nostril as directed   Yes Historical Provider, MD   Misc Natural Products (GLUCOSAMINE CHONDROITIN TRIPLE PO) Take 1 capsule by mouth 2 times daily   Yes Historical Provider, MD   metoprolol succinate (TOPROL XL) 100 MG extended release tablet Take 100 mg by mouth daily 17  Yes Historical Provider, MD   Multiple Vitamins-Minerals ([address] 50+) CAPS Take by mouth   Yes Historical Provider, MD   vitamin B-12 (CYANOCOBALAMIN) 250 MCG tablet Take 500 mcg by mouth daily   Yes Historical Provider, MD   sertraline (ZOLOFT) 100 MG tablet Take 100 mg by mouth daily.    Yes Historical Provider, MD   diclofenac sodium 1 % GEL Apply 2 g topically as needed for Pain  Patient not taking: Reported on 3/16/2022    Historical Provider, MD       Current medications:    Current Facility-Administered Medications   Medication Dose Route Frequency Provider Last Rate Last Admin    sodium chloride flush 0.9 % injection 5-40 mL  5-40 mL IntraVENous 2 times per day Anita Huynh MD        sodium chloride flush 0.9 % injection 5-40 mL  5-40 mL IntraVENous PRN Anita Huynh MD        0.9 % sodium chloride infusion  25 mL IntraVENous PRN Diogenes Serrano MD           Allergies: Allergies   Allergen Reactions    Amoxicillin Hives, Itching and Rash     Delayed reaction    Oxycodone-Acetaminophen Itching, Nausea Only and Rash    Codeine Nausea Only    Iodine Hives and Itching     Duraprep    Adhesive Tape Itching and Rash     PAPER  Paper tape, Rash    Vicodin [Hydrocodone-Acetaminophen] Itching     Pt states she can take medication but only with Benadryl.    Can take Tylenol without incident per patient       Problem List:    Patient Active Problem List   Diagnosis Code    HTN (hypertension) I10    Hypercholesteremia E78.00    Insomnia-on nightly ambien G47.00    Obesity-encouraged wt loss--will follow E66.9    Prothrombin gene uxgiwyix-ucprfwxfijnw-ezxt dr Ryland Rider if needed D68.52    Palpitations-advised to avoid caffiene R00.2    Climacteric--on zoloft for sx-(sees dr Ellie Al pap-9/11  last mammo-1/15-dexa 7/12 --wnl N95.1    Sciatica-s/p epidural steroid injections 2010 M54.30    S/P colonoscopy-done 2004--wnl Z98.890    Hyperglycemia R73.9    Recurrent UTI-sees dr Maria D Barone for this--urology N39.0    Rosacea--sees dr Lise Ohara L71.9    Colon polyp--9/14--dr noonan K63.5    Arthritis of left knee M17.12    Status post total left knee replacement Z96.652    Right knee pain M25.561    Arthritis of right knee M17.11    Arthritis of left shoulder region M19.012    Rotator cuff tendonitis, left M75.82    Other specified arthritis, left shoulder M13.812    Biceps tendonitis, left M75.22    S/P reverse total shoulder arthroplasty, 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 S15.257       Past Medical History:        Diagnosis Date    Acid reflux     Clotting disorder ([facility] [address]     factor 2    Diverticulitis     Hypertension     Sleep apnea     on cpap       Past Surgical History:        Procedure Laterality Date    ABDOMINAL HERNIA REPAIR  2011    ACHILLES TENDON SURGERY Right 2004 Lengthening    ADENOIDECTOMY  1989    CARPAL TUNNEL RELEASE  1998    Bilateral     SECTION   &    HEMORRHOID SURGERY  1981    JOINT REPLACEMENT Left 2016    tkr    OTHER SURGICAL HISTORY  1998    Left Thumb Repair [employer]    REVISION OF TOTAL SHOULDER Left 2019    LEFT REVERSE TOTAL SHOULDER REPLACEMENT performed by Silver Mistry MD at [address]    Nasal Septal Repair     SHOULDER ARTHROPLASTY Left 2019    LEFT REVERSE TOTAL SHOULDER REPLACEMENT    TOTAL KNEE ARTHROPLASTY Right 2017    Dr Lucio Mckeon         Social History:    Social History     Tobacco Use    Smoking status: Never Smoker    Smokeless tobacco: Never Used    Tobacco comment: counseled on tobacco exposure avoidance   Substance Use Topics    Alcohol use: Yes     Comment: Occasionally                                 Counseling given: Not Answered  Comment: counseled on tobacco exposure avoidance      Vital Signs (Current):   Vitals:    22 1605 22 0706 22 0709   BP:   (!) 154/79   Pulse:   84   Resp:   17   Temp:   98.2 °F (36.8 °C)   TempSrc:   Temporal   SpO2:   97%   Weight: 213 lb (96.6 kg) 213 lb (96.6 kg)    Height: 5' 5\" (1.651 m) 5' 5\" (1.651 m)                                               BP Readings from Last 3 Encounters:   22 (!) 154/79   19 100/63   19 126/60       NPO Status: Time of last liquid consumption:                         Time of last solid consumption:                         Date of last liquid consumption: 03/15/22                        Date of last solid food consumption: 22    BMI:   Wt Readings from Last 3 Encounters:   22 213 lb (96.6 kg)   22 224 lb (101.6 kg)   21 224 lb (101.6 kg)     Body mass index is 35.45 kg/m².     CBC:   Lab Results   Component Value Date    WBC 6.2 2019    RBC 4.62 2019    HGB 12.2 2019    HCT 36.4 2019 MCV 88.2 06/12/2019    RDW 14.6 06/12/2019     06/12/2019       CMP:   Lab Results   Component Value Date     05/28/2019    K 4.5 05/28/2019     05/28/2019    CO2 20 05/28/2019    BUN 16 05/28/2019    CREATININE 0.7 05/28/2019    GFRAA >60 05/28/2019    GFRAA >60 06/14/2012    AGRATIO 1.8 12/16/2014    LABGLOM >60 05/28/2019    LABGLOM 87 05/17/2011    GLUCOSE 162 05/28/2019    GLUCOSE 83 05/17/2011    PROT 7.4 12/16/2014    PROT 7.6 06/14/2012    CALCIUM 9.6 05/28/2019    BILITOT 0.5 12/16/2014    ALKPHOS 57 12/16/2014    AST 22 12/16/2014    ALT 26 12/16/2014       POC Tests: No results for input(s): POCGLU, POCNA, POCK, POCCL, POCBUN, POCHEMO, POCHCT in the last 72 hours. Coags:   Lab Results   Component Value Date    PROTIME 11.0 05/28/2019    INR 0.96 05/28/2019    APTT 31.0 05/28/2019       HCG (If Applicable): No results found for: PREGTESTUR, PREGSERUM, HCG, HCGQUANT     ABGs: No results found for: PHART, PO2ART, PKW4WBI, DZE9AWM, BEART, K6QCFUON     Type & Screen (If Applicable):  No results found for: LABABO, LABRH    Drug/Infectious Status (If Applicable):  No results found for: HIV, HEPCAB    COVID-19 Screening (If Applicable): No results found for: COVID19        Anesthesia Evaluation   no history of anesthetic complications:   Airway: Mallampati: II     Neck ROM: full  Comment: Prior airway:  Placement date 06/12/19; Placement time 0092; Preoxygenation Yes; Technique Direct laryngoscopy; Type Cuffed; Tube size 7 mm; Laryngoscope Mac; Blade size 3; Location Oral; Grade view 1; Insertion attempts 1;  Atraumatic Yes    Increased neck circumference, excess submandibular tissue  Mouth opening: > = 3 FB Dental:      Comment: Denies loose teeth    Pulmonary: breath sounds clear to auscultation  (+) sleep apnea:      (-) COPD, asthma, rhonchi, wheezes, rales and not a current smoker                           Cardiovascular:  Exercise tolerance: good (>4 METS),   (+) hypertension:, hyperlipidemia    (-) orthopnea,  [place], murmur, weak pulses and peripheral edema      Rhythm: regular  Rate: normal           Beta Blocker:  Dose within 24 Hrs         Neuro/Psych:   (+) depression/anxiety    (-) seizures, TIA and CVA            ROS comment: Chronic back pain with sciatica GI/Hepatic/Renal:   (+) GERD:, bowel prep,      (-) liver disease and no renal diseaseMorbid obesity: BMI: 35.45. ROS comment: History of colon polyps 2014. Endo/Other:    (+) blood dyscrasia (Factor II abnormality, no blood thinners, denies history of coagulopathy): arthritis: OA., .    Diabetes: prediabetes, HgbA1c: 5.7-6.1%, last 5.9%                ROS comment: Prothrombin mutation Abdominal:   (+) obese,     Abdomen: soft. Vascular: Other Findings:           Anesthesia Plan      MAC     ASA 2     (NPO appropriate; Ms. Jaja Bellamy denies active nausea / reflux.)  Induction: intravenous. Anesthetic plan and risks discussed with patient. Plan discussed with CRNA. This pre-anesthesia assessment may be used as a history and physical.    DOS STAFF ADDENDUM:    Pt seen and examined, chart reviewed (including anesthesia, drug and allergy history). No interval changes to history and physical examination. Anesthetic plan, risks, benefits, alternatives, and personnel involved discussed with patient. Patient verbalized an understanding and agrees to proceed.       Davin Conn MD  March 16, 2022  7:20 AM

## 2022-03-16 NOTE — H&P
[facility]   Pre-operative History and Physical    Patient: Crista Jimenes  : 1960  Acct#:         HISTORY OF PRESENT ILLNESS:    The patient is a 59 y.o. female  who presents with polyp surveillance; diarrhea  Past Medical History:        Diagnosis Date    Acid reflux     Clotting disorder ([address]     factor 2    Diverticulitis     Hypertension     Sleep apnea     on cpap     Past Surgical History:        Procedure Laterality Date    ABDOMINAL HERNIA REPAIR  2011    ACHILLES TENDON SURGERY Right 2004    Lengthening    ADENOIDECTOMY      CARPAL TUNNEL RELEASE      Bilateral     SECTION   &    HEMORRHOID SURGERY  1981    JOINT REPLACEMENT Left 2016    tkr    OTHER SURGICAL HISTORY  1998    Left Thumb Repair [employer]    REVISION OF TOTAL SHOULDER Left 2019    LEFT REVERSE TOTAL SHOULDER REPLACEMENT performed by Silver Mistry MD at [address] [address]    Nasal Septal Repair     SHOULDER ARTHROPLASTY Left 2019    LEFT REVERSE TOTAL SHOULDER REPLACEMENT    TOTAL KNEE ARTHROPLASTY Right 2017    Dr Lucio Mckeon  2009     Medications prior to admission:   Prior to Admission medications    Medication Sig Start Date End Date Taking?  Authorizing Provider   Azelaic Acid 15 % GEL Apply topically See Admin Instructions Twice a day   Yes Historical Provider, MD   Zinc 100 MG TABS Take 1 tablet by mouth every other day   Yes Historical Provider, MD   Probiotic Product (PROBIOTIC ADVANCED PO) Take by mouth   Yes Historical Provider, MD   LOSARTAN POTASSIUM PO Take 100 mg by mouth   Yes Historical Provider, MD   azelastine (ASTELIN) 0.1 % nasal spray 1 spray by Nasal route 2 times daily Use in each nostril as directed   Yes Historical Provider, MD   Misc Natural Products (GLUCOSAMINE CHONDROITIN TRIPLE PO) Take 1 capsule by mouth 2 times daily   Yes Historical Provider, MD   metoprolol succinate (TOPROL XL) 100 MG extended release tablet Take 100 mg by mouth daily 8/26/17  Yes Historical Provider, MD   Multiple Vitamins-Minerals ([address] 50+) CAPS Take by mouth   Yes Historical Provider, MD   vitamin B-12 (CYANOCOBALAMIN) 250 MCG tablet Take 500 mcg by mouth daily   Yes Historical Provider, MD   sertraline (ZOLOFT) 100 MG tablet Take 100 mg by mouth daily. 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   Tobacco Use    Smoking status: Never Smoker    Smokeless tobacco: Never Used    Tobacco comment: counseled on tobacco exposure avoidance   Vaping Use    Vaping Use: Never used   Substance and Sexual Activity    Alcohol use: Yes     Comment: Occasionally     Drug use: No    Sexual activity: Yes     Partners: Male   Other Topics Concern    Not on file   Social History Narrative    Not on file     Social Determinants of Health     Financial Resource Strain:     Difficulty of Paying Living Expenses: Not on file   Food Insecurity:     Worried About [address] in the Last Year: Not on file    Malik of Food in the Last Year: Not on file   Transportation Needs:     Lack of Transportation (Medical): Not on file    Lack of Transportation (Non-Medical):  Not on file   Physical Activity:     Days of Exercise per Week: Not on file    Minutes of Exercise per Session: Not on file   Stress:     Feeling of Stress : Not on file   Social Connections:     Frequency of Communication with Friends and Family: Not on file    Frequency of Social Gatherings with Friends and Family: Not on file    Attends Yarsani Services: Not on file    Active Member of Clubs or Organizations: Not on file    Attends Club or Organization Meetings: Not on file    Marital Status: Not on file   Intimate Partner Violence:     Fear of Current or Ex-Partner: Not on file    Emotionally Abused: Not on file    Physically Abused: Not on file    Sexually Abused: Not on file   Housing Stability:     Unable to Pay for Housing in the Last Year: Not on file    Number of [place] in the Last Year: Not on file    Unstable Housing in the Last Year: Not on file           Family History:   Family History   Problem Relation Age of Onset    Breast Cancer Mother          PHYSICAL EXAM:      BP (!) 154/79   Pulse 84   Temp 98.2 °F (36.8 °C) (Temporal)   Resp 17   Ht 5' 5\" (1.651 m)   Wt 213 lb (96.6 kg)   LMP 09/08/2012   SpO2 97%   BMI 35.45 kg/m²  I        Heart: Normal    Lungs: Normal    Abdomen: Normal      ASA Grade: ASA 2 - Patient with mild systemic disease with no functional limitations    II (soft palate, uvula, fauces visible)  ASSESSMENT AND PLAN:    1. Patient is a 59 y.o. female here for Colonoscopy  2. Procedure options, risks and benefits reviewed with patient who expresses understanding.

## 2022-03-16 NOTE — BRIEF OP NOTE
Brief Postoperative Note    Marya Diaz  YOB: 1960  [id number]      Pre-operative Diagnosis: Polyp surveillance    Previous Colonoscopy: Yes  When: 03/14/17  Greater than 3 years? Yes      Post-operative Diagnosis: Same    Procedure: Colonoscopy    The preparation was good.     Anesthesia: MAC    Surgeons/Assistants: Jessica Reis MD    Estimated Blood Loss: None    Complications: None    Specimens: Was Obtained: Random colon    Findings: See dictated report    Electronically signed by Jessica Reis MD on 7/10/2017 at 7:45 AM

## 2022-03-16 NOTE — OP NOTE
Operative Note      Patient: Siddhartha Causey  YOB: 1960  MRN: [id number]    Date of Procedure: 3/16/2022    Pre-Op Diagnosis: History of colon polyps    Post-Op Diagnosis: Same       Procedure(s):  COLONOSCOPY WITH BIOPSY    Surgeon(s):  Janay Mcclure MD    Assistant:   * No surgical staff found *    Anesthesia: Monitor Anesthesia Care    Estimated Blood Loss (mL): None    Complications: None    Specimens:   ID Type Source Tests Collected by Time Destination   A : A.  Random colon bx's Tissue Colon SURGICAL PATHOLOGY Janay Mcclure MD 3/16/2022 1071        Implants:  * No implants in log *      Drains: * No LDAs found *    Findings: See dictated repoert    Detailed Description of Procedure:   See dictated report    Electronically signed by Janay Mcclure MD on 3/16/2022 at 8:05 AM

## 2022-03-16 NOTE — ANESTHESIA POSTPROCEDURE EVALUATION
Department of Anesthesiology  Postprocedure Note    Patient: Crista Jimenes  MRN: [id number]  YOB: 1960  Date of evaluation: 3/16/2022  Time:  8:12 AM     Procedure Summary     Date: 03/16/22 Room / Location: [address] [place] [address]    Anesthesia Start: 0740 Anesthesia Stop:     Procedure: COLONOSCOPY WITH BIOPSY (N/A ) Diagnosis:       History of colon polyps      (History of colon polyps)    Surgeons: Saundra Serna MD Responsible Provider: Kelly Hutson MD    Anesthesia Type: MAC ASA Status: 2          Anesthesia Type: MAC    Maikel Phase I: Maikel Score: 10    Maikel Phase II: Maikel Score: 9    Last vitals: Reviewed and per EMR flowsheets. Anesthesia Post Evaluation    Patient location during evaluation: PACU  Patient participation: complete - patient participated  Level of consciousness: awake and alert  Airway patency: patent  Nausea & Vomiting: no nausea and no vomiting  Complications: no  Cardiovascular status: hemodynamically stable and blood pressure returned to baseline  Respiratory status: spontaneous ventilation, nonlabored ventilation and room air  Hydration status: stable  Comments: Ms. Heidi Wray resting comfortably following procedure. Appropriate for discharge home with , who is present at bedside.       Kelly Hutson MD

## 2022-03-16 NOTE — PROCEDURES
[address] [place] [address] 16                               COLONOSCOPY REPORT    PATIENT NAME: Karla Chaidez                  :        1960  MED REC NO:   [id number]                          ROOM:  ACCOUNT NO:   [de-identified]                           ADMIT DATE: 2022  PROVIDER:     Kuldip Dempsey MD    DATE OF PROCEDURE:  2022    REFERRING PROVIDER:  Tammie Fernandez MD    PATIENT HISTORY:  A 58-year-old female, outpatient. INSTRUMENT USED:  Olympus [id number]-Q180AL. MEDICATIONS OF PROCEDURE:  The patient was premedicated with Diprivan  intravenously as administered by the anesthesiology service. INDICATIONS:  The patient has a history of colonic polyps. She also has  had intermittent diarrhea, which is likely related to irritable bowel  syndrome. DESCRIPTION OF PROCEDURE:  The digital and anal exams were remarkable  for external skin tags. The colonoscope was inserted to the cecum. The  prep was alternately good to fair. Where the prep was fair, lesions  such as small polyps or vascular ectasias could have been missed. No  mucosal lesions were identified. There was extensive diverticulosis  throughout. Random colonic biopsies were obtained to evaluate for  microscopic colitis. ESTIMATED BLOOD LOSS:  None. IMPRESSION:  1. Diverticulosis. 2.  Random biopsies obtained. PLAN:  The patient will call the office for biopsy results and  recommendations. She should undergo a surveillance colonoscopy in five  years. ANGEL Conroy MD    D: 2022 8:19:08       T: 2022 8:22:51     [id number]  Job#: [id number]     Doc#: [id number]    CC:  Kuldip Kim MD

## 2022-12-05 ENCOUNTER — OFFICE VISIT (OUTPATIENT)
Dept: ORTHOPEDIC SURGERY | Age: 62
End: 2022-12-05
Payer: COMMERCIAL

## 2022-12-05 VITALS — WEIGHT: 220 LBS | BODY MASS INDEX: 37.56 KG/M2 | HEIGHT: 64 IN

## 2022-12-05 DIAGNOSIS — Z96.612 S/P REVERSE TOTAL SHOULDER ARTHROPLASTY, LEFT: Primary | ICD-10-CM

## 2022-12-05 DIAGNOSIS — M19.011 ARTHRITIS OF RIGHT SHOULDER REGION: ICD-10-CM

## 2022-12-05 DIAGNOSIS — Z96.653 HISTORY OF TOTAL KNEE ARTHROPLASTY, BILATERAL: ICD-10-CM

## 2022-12-05 DIAGNOSIS — M25.511 RIGHT SHOULDER PAIN, UNSPECIFIED CHRONICITY: ICD-10-CM

## 2022-12-05 PROCEDURE — 20610 DRAIN/INJ JOINT/BURSA W/O US: CPT | Performed by: PHYSICIAN ASSISTANT

## 2022-12-05 PROCEDURE — 99213 OFFICE O/P EST LOW 20 MIN: CPT | Performed by: PHYSICIAN ASSISTANT

## 2022-12-05 RX ORDER — TRIAMCINOLONE ACETONIDE 40 MG/ML
40 INJECTION, SUSPENSION INTRA-ARTICULAR; INTRAMUSCULAR ONCE
Status: COMPLETED | OUTPATIENT
Start: 2022-12-05 | End: 2022-12-05

## 2022-12-05 RX ORDER — BUPIVACAINE HYDROCHLORIDE 2.5 MG/ML
2 INJECTION, SOLUTION INFILTRATION; PERINEURAL ONCE
Status: COMPLETED | OUTPATIENT
Start: 2022-12-05 | End: 2022-12-05

## 2022-12-05 RX ADMIN — TRIAMCINOLONE ACETONIDE 40 MG: 40 INJECTION, SUSPENSION INTRA-ARTICULAR; INTRAMUSCULAR at 12:39

## 2022-12-05 RX ADMIN — BUPIVACAINE HYDROCHLORIDE 5 MG: 2.5 INJECTION, SOLUTION INFILTRATION; PERINEURAL at 12:39

## 2022-12-05 NOTE — PROGRESS NOTES
This dictation was done with Dragon dictation and may contain mechanical errors related to translation. I have today reviewed with Milind Ramirez the clinically relevant, past medical history, medications, allergies, family history, social history, and Review Of Systems form the patients most recent history form & I have documented any details relevant to today's presenting complaints in my history below. Ms. Karen Francisco's self-reported past medical history, medications, allergies, family history, social history, and Review Of Systems form has been scanned into the chart under the \"Media\" tab. Subjective:  Milind Ramirez is a 57 y.o. who is here in follow-up for left reverse total shoulder her left and right total knee replacements as well as increasing soreness progressing worsening of pain in her right shoulder. She says it can be an 8 out of 10 pain in her shoulders. She is happy with the 2 knee replacements. She had some early difficulties with the reverse total shoulder having had a dislocation but since then back in January 2022, she has done well.   She was sent for x-rays including an AP lateral and sunrise view of both the left and right knee and AP transaxillary view and Y-view of the right shoulder and left shoulder      Patient Active Problem List   Diagnosis    HTN (hypertension)    Hypercholesteremia    Insomnia-on nightly ambien    Obesity-encouraged wt loss--will follow    Prothrombin gene fsfkvmvk-lkxnuqxjtvfi-gyax dr Cintia Rivera if needed    Palpitations-advised to avoid caffiene    Climacteric--on zoloft for sx-(sees dr Arley Ocampo pap-9/11  last mammo-1/15-dexa 7/12 --[facility]    Sciatica-s/p epidural steroid injections 2010    S/P colonoscopy-done 2004--[facility]    Hyperglycemia    Recurrent UTI-sees dr Shree Reyes for this--urology    Rosacea--sees dr Samuel Whitehead polyp--9/14--dr noonan    Arthritis of left knee    Status post total left knee replacement    Right knee pain Arthritis of right knee    Arthritis of left shoulder region    Rotator cuff tendonitis, left    Other specified arthritis, left shoulder    Biceps tendonitis, left    S/P reverse total shoulder arthroplasty, 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           Current Outpatient Medications on File Prior to Visit   Medication Sig Dispense Refill    Azelaic Acid 15 % GEL Apply topically See Admin Instructions Twice a day      Zinc 100 MG TABS Take 1 tablet by mouth every other day      Probiotic Product (PROBIOTIC ADVANCED PO) Take by mouth      LOSARTAN POTASSIUM PO Take 100 mg by mouth      azelastine (ASTELIN) 0.1 % nasal spray 1 spray by Nasal route 2 times daily Use in each nostril as directed      [facility] Natural Products (GLUCOSAMINE CHONDROITIN TRIPLE PO) Take 1 capsule by mouth 2 times daily      diclofenac sodium 1 % GEL Apply 2 g topically as needed for Pain      metoprolol succinate (TOPROL XL) 100 MG extended release tablet Take 100 mg by mouth daily      Multiple Vitamins-Minerals ([place] ADULT 50+) CAPS Take by mouth      vitamin B-12 (CYANOCOBALAMIN) 250 MCG tablet Take 500 mcg by mouth daily      sertraline (ZOLOFT) 100 MG tablet Take 100 mg by mouth daily. No current facility-administered medications on file prior to visit. Objective:   Height 5' 4\" (1.626 m), weight 220 lb (99.8 kg), last menstrual period 09/08/2012, not currently breastfeeding. On examination is a very pleasant 77-year-old female who is oriented x3 she is able to walk without antalgia has 0 to 120 degrees of motion and is doing extremely well with both knees with good stability and strength. The left shoulder has good full range of motion through passive motion. She has 180 degrees of forward flexion 130 degrees of abduction. On the right side she has a positive impingement positive crossover and weakness supraspinous strength testing. She has about 165 degrees of forward flexion and 100 degrees of abduction.   Neuro exam grossly intact both lower extremities. Intact sensation to light touch. Motor exam 4+ to 5/5 in all major motor groups. Negative Mcclulough's sign. Skin is warm, dry and intact with out erythema or significant increased temperature around the knee joint(s). There are no cutaneous lesions or lymphadenopathy present. X-RAYS:  X-rays taken the office today of the 2 knee show excellent alignment sizing for the prosthesis no lucencies or abnormalities patella tracking is good and no fracture seen. The AP transaxillary view and Y view show excellent alignment sizing and fit of the right reverse total shoulder with no lucencies good screw fixation into the glenoid or any other bony abnormalities. The right shoulder has AP transaxillary view and Y view films that show some acromial impingement but moderate to advanced osteoarthritis of the glenohumeral joint with mild superior migration of the humeral head. No obvious fractures are seen this was shown to the patient        Assessment:  Stable bilateral total knee replacements stable left reverse total shoulder and osteoarthritis in right shoulder. Plan:  During today's visit, there was approximately 30 minutes of face-to-face discussion in regards to the patient's current condition and treatment options. More than 50 % of the time was counseling and coordination of care as indicated above. With her consent she was injected with 1 cc Kenalog and 2 cc of Marcaine into her right subacromial and glenohumeral joint space.   She tolerated well we will have her do some physical therapy to her tolerance at home for treatment based on how well she does      PROCEDURE NOTE:  Cortisone shot given for right shoulder      They will schedule a follow up in 3 months

## 2023-07-31 ENCOUNTER — OFFICE VISIT (OUTPATIENT)
Dept: ORTHOPEDIC SURGERY | Age: 63
End: 2023-07-31
Payer: COMMERCIAL

## 2023-07-31 VITALS — HEIGHT: 64 IN | RESPIRATION RATE: 16 BRPM | WEIGHT: 220 LBS | BODY MASS INDEX: 37.56 KG/M2

## 2023-07-31 DIAGNOSIS — M19.011 ARTHRITIS OF RIGHT SHOULDER REGION: Primary | ICD-10-CM

## 2023-07-31 PROCEDURE — 20610 DRAIN/INJ JOINT/BURSA W/O US: CPT | Performed by: PHYSICIAN ASSISTANT

## 2023-07-31 RX ORDER — BUPIVACAINE HYDROCHLORIDE 2.5 MG/ML
2 INJECTION, SOLUTION INFILTRATION; PERINEURAL ONCE
Status: COMPLETED | OUTPATIENT
Start: 2023-07-31 | End: 2023-07-31

## 2023-07-31 RX ORDER — TRIAMCINOLONE ACETONIDE 40 MG/ML
40 INJECTION, SUSPENSION INTRA-ARTICULAR; INTRAMUSCULAR ONCE
Status: COMPLETED | OUTPATIENT
Start: 2023-07-31 | End: 2023-07-31

## 2023-07-31 RX ADMIN — TRIAMCINOLONE ACETONIDE 40 MG: 40 INJECTION, SUSPENSION INTRA-ARTICULAR; INTRAMUSCULAR at 09:23

## 2023-07-31 RX ADMIN — BUPIVACAINE HYDROCHLORIDE 5 MG: 2.5 INJECTION, SOLUTION INFILTRATION; PERINEURAL at 09:22

## 2023-11-08 NOTE — FLOWSHEET NOTE
[address] [facility], [place]                                                         Physical Therapy Daily Treatment Note  Date:  2019    Patient Name:  Yvette Paris    :  1960  MRN: [id number]    Medical/Treatment Diagnosis Information:  · Diagnosis: Z28.387 (ICD-10-CM) - S/P reverse total shoulder arthroplasty, left on 19  · Treatment Diagnosis: M25.512 (L) shoulder pain; M62.81 muscle weakness  Insurance/Certification information:  PT Insurance Information: [address]- 30 visits, no auth, only 3 codes billable per visit, used 1 visit  Physician Information:  Referring Practitioner: Gracie Huffman MD  Plan of care signed (Y/N): Y    Date of Patient follow up with Physician: 19    Progress Note: [x]  Yes  []  No  Next due by: Visit #10      Latex Allergy:  [x]NO      []YES  Preferred Language for Healthcare:   [x]English       []other:    Visit # Insurance Allowable   5 29     Pain level:  0/10 On 2019    SUBJECTIVE:  Pt is 6 weeks s/p surgery on 19. Pt brought in her exercises to see if she has the right ones crossed out. She says her shoulder is still feeling pretty good overall.      OBJECTIVE: See eval  Observation:   Test measurements:                ROM PROM-  AROM  Comment     L- 19 R L R     Flexion     150 167     Abduction     170 in some scaption 183     ER   40 at side supine    33 at side       IR      BB to T 9      Other             Other                    Strength- deferred due to recent surgery L R Comment   Flexion         Abduction         ER         IR         Supraspinatus         Upper Trap         Lower Trap         Mid Trap         Rhomboids         Biceps         Triceps         Horizontal Abduction         Horizontal Adduction         Lats            Orthopedic Special Tests: deferred  Special Tests Left Right   Apley Scratch IR:  ER:   Cross body: You can access the FollowMyHealth Patient Portal offered by [facility] by registering at the following website: [email or web address]. By joining [employer]’s FollowMyHealth portal, you will also be able to view your health information using other applications (apps) compatible with our system.

## 2023-12-04 ENCOUNTER — OFFICE VISIT (OUTPATIENT)
Dept: ORTHOPEDIC SURGERY | Age: 63
End: 2023-12-04
Payer: MEDICARE

## 2023-12-04 VITALS — RESPIRATION RATE: 16 BRPM | WEIGHT: 220 LBS | HEIGHT: 64 IN | BODY MASS INDEX: 37.56 KG/M2

## 2023-12-04 DIAGNOSIS — Z96.612 S/P REVERSE TOTAL SHOULDER ARTHROPLASTY, LEFT: Primary | ICD-10-CM

## 2023-12-04 DIAGNOSIS — M25.511 RIGHT SHOULDER PAIN, UNSPECIFIED CHRONICITY: ICD-10-CM

## 2023-12-04 DIAGNOSIS — Z96.653 HISTORY OF TOTAL KNEE ARTHROPLASTY, BILATERAL: ICD-10-CM

## 2023-12-04 PROCEDURE — 3017F COLORECTAL CA SCREEN DOC REV: CPT | Performed by: PHYSICIAN ASSISTANT

## 2023-12-04 PROCEDURE — G8484 FLU IMMUNIZE NO ADMIN: HCPCS | Performed by: PHYSICIAN ASSISTANT

## 2023-12-04 PROCEDURE — G8427 DOCREV CUR MEDS BY ELIG CLIN: HCPCS | Performed by: PHYSICIAN ASSISTANT

## 2023-12-04 PROCEDURE — 1123F ACP DISCUSS/DSCN MKR DOCD: CPT | Performed by: PHYSICIAN ASSISTANT

## 2023-12-04 PROCEDURE — 1036F TOBACCO NON-USER: CPT | Performed by: PHYSICIAN ASSISTANT

## 2023-12-04 PROCEDURE — 99213 OFFICE O/P EST LOW 20 MIN: CPT | Performed by: PHYSICIAN ASSISTANT

## 2023-12-04 PROCEDURE — G8417 CALC BMI ABV UP PARAM F/U: HCPCS | Performed by: PHYSICIAN ASSISTANT

## 2023-12-04 PROCEDURE — 1090F PRES/ABSN URINE INCON ASSESS: CPT | Performed by: PHYSICIAN ASSISTANT

## 2023-12-04 PROCEDURE — G8400 PT W/DXA NO RESULTS DOC: HCPCS | Performed by: PHYSICIAN ASSISTANT

## 2024-03-21 ENCOUNTER — OFFICE VISIT (OUTPATIENT)
Dept: ORTHOPEDIC SURGERY | Age: 64
End: 2024-03-21
Payer: MEDICARE

## 2024-03-21 ENCOUNTER — TELEPHONE (OUTPATIENT)
Dept: ORTHOPEDIC SURGERY | Age: 64
End: 2024-03-21

## 2024-03-21 DIAGNOSIS — M19.011 ARTHRITIS OF RIGHT SHOULDER REGION: Primary | ICD-10-CM

## 2024-03-21 PROCEDURE — G8484 FLU IMMUNIZE NO ADMIN: HCPCS | Performed by: ORTHOPAEDIC SURGERY

## 2024-03-21 PROCEDURE — 1090F PRES/ABSN URINE INCON ASSESS: CPT | Performed by: ORTHOPAEDIC SURGERY

## 2024-03-21 PROCEDURE — G8417 CALC BMI ABV UP PARAM F/U: HCPCS | Performed by: ORTHOPAEDIC SURGERY

## 2024-03-21 PROCEDURE — 1123F ACP DISCUSS/DSCN MKR DOCD: CPT | Performed by: ORTHOPAEDIC SURGERY

## 2024-03-21 PROCEDURE — G8427 DOCREV CUR MEDS BY ELIG CLIN: HCPCS | Performed by: ORTHOPAEDIC SURGERY

## 2024-03-21 PROCEDURE — 1036F TOBACCO NON-USER: CPT | Performed by: ORTHOPAEDIC SURGERY

## 2024-03-21 PROCEDURE — G8400 PT W/DXA NO RESULTS DOC: HCPCS | Performed by: ORTHOPAEDIC SURGERY

## 2024-03-21 PROCEDURE — 99213 OFFICE O/P EST LOW 20 MIN: CPT | Performed by: ORTHOPAEDIC SURGERY

## 2024-03-21 PROCEDURE — 3017F COLORECTAL CA SCREEN DOC REV: CPT | Performed by: ORTHOPAEDIC SURGERY

## 2024-03-21 NOTE — PROGRESS NOTES
[facility]  Office Visit    Chief Complaint: Right shoulder pain    HPI:  Bernice Francisco is a 63 y.o. who is here with right shoulder pain.  She is right-hand dominant.  She reports a multiple year history of right shoulder pain and loss of range of motion.  She does have a history of bilateral total knee arthroplasty and left reverse total shoulder arthroplasty with Dr. Huntley.  Her left shoulder was replaced in 2019.  She has had a subsequent dislocation and multiple subluxation events with the left shoulder.  She denies neck pain, numbness, tingling, pain that radiates down her arm to her fingers.  She has been treated in the past with steroid injections, but continues to have pain.  She rates pain as up to 10/10 at times.  She denies diabetes, heart or lung issues, blood thinner usage, tobacco use.  She does have a prothrombin gene mutation and has been on blood thinners in the past, but is not on any currently.      Past Medical History:   Diagnosis Date    Acid reflux     Clotting disorder (HCC)     factor 2    Diverticulitis     Hypertension     Sleep apnea     on cpap        ROS:  Constitutional: denies fever, chills, weight loss  MSK: denies pain in other joints, muscle aches  Neurological: denies numbness, tingling, weakness    Exam:  Appearance: sitting in exam room chair, appears to be in no acute distress, awake and alert  Resp: unlabored breathing on room air  Skin: warm, dry and intact with out erythema or significant increased temperature  RUE: Examination of the shoulder shows no gross defects. Active shoulder forward flexion is 120 degrees, external rotation 15 degrees at the neutral position, internal rotation to lumbar spine. Sensation is intact light touch.  Brisk capillary refill.  2+ radial pulse. Strength is 5/5 in all muscle groups.     Imaging:  Recent right shoulder radiographs were reviewed and significant for bone-on-bone arthritis with a large inferior

## 2024-03-22 ENCOUNTER — PREP FOR PROCEDURE (OUTPATIENT)
Dept: ORTHOPEDIC SURGERY | Age: 64
End: 2024-03-22

## 2024-03-22 PROBLEM — M19.011 DEGENERATIVE JOINT DISEASE OF RIGHT SHOULDER: Status: ACTIVE | Noted: 2024-03-22

## 2024-04-03 ENCOUNTER — HOSPITAL ENCOUNTER (OUTPATIENT)
Dept: CT IMAGING | Age: 64
Discharge: HOME | End: 2024-04-03
Attending: ORTHOPAEDIC SURGERY
Payer: MEDICARE

## 2024-04-03 DIAGNOSIS — M19.011 ARTHRITIS OF RIGHT SHOULDER REGION: ICD-10-CM

## 2024-04-03 PROCEDURE — 73200 CT UPPER EXTREMITY W/O DYE: CPT

## 2024-05-06 ENCOUNTER — HOSPITAL ENCOUNTER (OUTPATIENT)
Dept: PREADMISSION TESTING | Age: 64
Discharge: HOME | End: 2024-05-10
Payer: MEDICARE

## 2024-05-06 DIAGNOSIS — M19.011 ARTHRITIS OF RIGHT SHOULDER REGION: ICD-10-CM

## 2024-05-06 LAB
25(OH)D3 SERPL-MCNC: 43.9 NG/ML
ABO + RH BLD: NORMAL
ALBUMIN SERPL-MCNC: 4.7 G/DL (ref 3.4–5)
ANION GAP SERPL CALCULATED.3IONS-SCNC: 13 MMOL/L (ref 3–16)
APTT BLD: 29.2 SEC (ref 22.1–36.4)
BACTERIA URNS QL MICRO: ABNORMAL /HPF
BASOPHILS # BLD: 0.1 K/UL (ref 0–0.2)
BASOPHILS NFR BLD: 0.7 %
BILIRUB UR QL STRIP.AUTO: NEGATIVE
BLD GP AB SCN SERPL QL: NORMAL
BLD GP AB SCN SERPL QL: NORMAL
BUN SERPL-MCNC: 14 MG/DL (ref 7–20)
CALCIUM SERPL-MCNC: 9.7 MG/DL (ref 8.3–10.6)
CHLORIDE SERPL-SCNC: 106 MMOL/L (ref 99–110)
CLARITY UR: CLEAR
CO2 SERPL-SCNC: 22 MMOL/L (ref 21–32)
COLOR UR: YELLOW
CREAT SERPL-MCNC: 0.6 MG/DL (ref 0.6–1.2)
DEPRECATED RDW RBC AUTO: 14.6 % (ref 12.4–15.4)
EOSINOPHIL # BLD: 0.2 K/UL (ref 0–0.6)
EOSINOPHIL NFR BLD: 2.9 %
EPI CELLS #/AREA URNS AUTO: 2 /HPF (ref 0–5)
EST. AVERAGE GLUCOSE BLD GHB EST-MCNC: 108.3 MG/DL
GFR SERPLBLD CREATININE-BSD FMLA CKD-EPI: >90 ML/MIN/{1.73_M2}
GLUCOSE SERPL-MCNC: 116 MG/DL (ref 70–99)
GLUCOSE UR STRIP.AUTO-MCNC: NEGATIVE MG/DL
HBA1C MFR BLD: 5.4 %
HCT VFR BLD AUTO: 39.7 % (ref 36–48)
HGB BLD-MCNC: 13.4 G/DL (ref 12–16)
HGB UR QL STRIP.AUTO: ABNORMAL
HYALINE CASTS #/AREA URNS AUTO: 0 /LPF (ref 0–8)
INR PPP: 0.94 (ref 0.85–1.15)
KETONES UR STRIP.AUTO-MCNC: NEGATIVE MG/DL
LEUKOCYTE ESTERASE UR QL STRIP.AUTO: ABNORMAL
LYMPHOCYTES # BLD: 1.5 K/UL (ref 1–5.1)
LYMPHOCYTES NFR BLD: 21.7 %
MCH RBC QN AUTO: 28.8 PG (ref 26–34)
MCHC RBC AUTO-ENTMCNC: 33.8 G/DL (ref 31–36)
MCV RBC AUTO: 85.1 FL (ref 80–100)
MONOCYTES # BLD: 0.5 K/UL (ref 0–1.3)
MONOCYTES NFR BLD: 6.5 %
NEUTROPHILS # BLD: 4.8 K/UL (ref 1.7–7.7)
NEUTROPHILS NFR BLD: 68.2 %
NITRITE UR QL STRIP.AUTO: NEGATIVE
PH UR STRIP.AUTO: 5.5 [PH] (ref 5–8)
PLATELET # BLD AUTO: 101 K/UL (ref 135–450)
PMV BLD AUTO: 8.5 FL (ref 5–10.5)
POTASSIUM SERPL-SCNC: 4.5 MMOL/L (ref 3.5–5.1)
PREALB SERPL-MCNC: 18.6 MG/DL (ref 20–40)
PROT UR STRIP.AUTO-MCNC: NEGATIVE MG/DL
PROTHROMBIN TIME: 12.8 SEC (ref 11.9–14.9)
RBC # BLD AUTO: 4.66 M/UL (ref 4–5.2)
RBC CLUMPS #/AREA URNS AUTO: 1 /HPF (ref 0–4)
SODIUM SERPL-SCNC: 141 MMOL/L (ref 136–145)
SP GR UR STRIP.AUTO: 1.01 (ref 1–1.03)
UA COMPLETE W REFLEX CULTURE PNL UR: ABNORMAL
UA DIPSTICK W REFLEX MICRO PNL UR: YES
URN SPEC COLLECT METH UR: ABNORMAL
UROBILINOGEN UR STRIP-ACNC: 0.2 E.U./DL
WBC # BLD AUTO: 7 K/UL (ref 4–11)
WBC #/AREA URNS AUTO: 4 /HPF (ref 0–5)

## 2024-05-06 PROCEDURE — 82040 ASSAY OF SERUM ALBUMIN: CPT

## 2024-05-06 PROCEDURE — 81001 URINALYSIS AUTO W/SCOPE: CPT

## 2024-05-06 PROCEDURE — 85025 COMPLETE CBC W/AUTO DIFF WBC: CPT

## 2024-05-06 PROCEDURE — 85610 PROTHROMBIN TIME: CPT

## 2024-05-06 PROCEDURE — 82306 VITAMIN D 25 HYDROXY: CPT

## 2024-05-06 PROCEDURE — 93005 ELECTROCARDIOGRAM TRACING: CPT

## 2024-05-06 PROCEDURE — 85730 THROMBOPLASTIN TIME PARTIAL: CPT

## 2024-05-06 PROCEDURE — 80048 BASIC METABOLIC PNL TOTAL CA: CPT

## 2024-05-06 PROCEDURE — 83036 HEMOGLOBIN GLYCOSYLATED A1C: CPT

## 2024-05-06 PROCEDURE — 86901 BLOOD TYPING SEROLOGIC RH(D): CPT

## 2024-05-06 PROCEDURE — 84134 ASSAY OF PREALBUMIN: CPT

## 2024-05-06 PROCEDURE — 87641 MR-STAPH DNA AMP PROBE: CPT

## 2024-05-06 PROCEDURE — 86850 RBC ANTIBODY SCREEN: CPT

## 2024-05-06 PROCEDURE — 86900 BLOOD TYPING SEROLOGIC ABO: CPT

## 2024-05-06 NOTE — PROGRESS NOTES
Patient  attended JET class on 5/6/2024 . Patient verified surgery for Total Shoulder replacement. Patient  received patient information and educational JET folder including the following handouts: jet powerpoint, ERAS, incentive spirometry including purpose and how to perform, case management contact information, hand hygiene, preventing constipation, choices for home health care agency list, pre-operative showering techniques and the use of anti-septic 3 days before surgery.  Interviews completed by OT and Nurse Navigator.  Labs and Tests to be completed/completed as ordered/necessary by outpatient lab if not already completed.  Anti-septic bottle given to patient to take home. Patient  states no further questions or concerns. Patient provided with orthopedic office, after hours clinic, case management, and nurse navigator contact information.    Date Of Surgery: 5/15/2024  Surgeon: Dr Sanchez  Per Patient Will see/Saw Primary care provider on 4/24/2024.     Will see/Saw Specialist Hematology/Oncology on  4/8/2024 .    HISTORY OF JOINT REPLACEMENT(S): Total Knee Replacement, Total Shoulder Replacement    DC Plan: Home    HOME ASSISTANCE - WHO WILL BE STAYING WITH YOU AT HOME FOR FIRST SEVERAL DAYS? spouse Davin    DC TRANSPORTATION: spouse Davin    STEPS INTO HOME: 2    STEPS TO BATHROOM/BEDROOM: none - one level    DME NEEDS:  Denies durable medical equipment needs at this time. She does not use a device preop. She has a cane, shower chair, and a raised toilet seat.    LENGTH OF STAY HAS BEEN DISCUSSED WITH THE PATIENT, APPROPRIATE TO HIS/ HER PROCEDURE.  PATIENT HAS BEEN INFORMED THAT THEY WILL BE DISCHARGED WHEN THE PHYSICIAN DEEMS THEM MEDICALLY READY. MOST PATIENTS CAN EXPECT TO BE IN THE HOSPITAL ONE NIGHT OR 1-2 DAYS AS AN ADMISSION FOR THOSE WITH MEDICAL HEALTH ISSUES/COMPLICATIONS.    HOME CARE CHOICE(S): Declined a need for home health care at this time    SNF/REHAB CHOICES (S):     Declined a need for

## 2024-05-07 LAB
EKG ATRIAL RATE: 86 BPM
EKG DIAGNOSIS: NORMAL
EKG P AXIS: 9 DEGREES
EKG P-R INTERVAL: 214 MS
EKG Q-T INTERVAL: 384 MS
EKG QRS DURATION: 80 MS
EKG QTC CALCULATION (BAZETT): 442 MS
EKG R AXIS: -5 DEGREES
EKG T AXIS: 1 DEGREES
EKG VENTRICULAR RATE: 80 BPM
MRSA DNA SPEC QL NAA+PROBE: NORMAL

## 2024-05-07 RX ORDER — ASCORBIC ACID 500 MG
1000 TABLET ORAL DAILY
COMMUNITY

## 2024-05-07 RX ORDER — DICYCLOMINE HCL 20 MG
20 TABLET ORAL PRN
COMMUNITY

## 2024-05-08 NOTE — PROGRESS NOTES
Notification sent to Dr Sanchez and medical assistant Samantha HAHN regarding abnormal preoperative labs and pertinent medical history.

## 2024-05-09 ENCOUNTER — OFFICE VISIT (OUTPATIENT)
Dept: ORTHOPEDIC SURGERY | Age: 64
End: 2024-05-09
Payer: MEDICARE

## 2024-05-09 DIAGNOSIS — M19.011 ARTHRITIS OF RIGHT SHOULDER REGION: Primary | ICD-10-CM

## 2024-05-09 PROCEDURE — 3017F COLORECTAL CA SCREEN DOC REV: CPT | Performed by: ORTHOPAEDIC SURGERY

## 2024-05-09 PROCEDURE — 1036F TOBACCO NON-USER: CPT | Performed by: ORTHOPAEDIC SURGERY

## 2024-05-09 PROCEDURE — G8417 CALC BMI ABV UP PARAM F/U: HCPCS | Performed by: ORTHOPAEDIC SURGERY

## 2024-05-09 PROCEDURE — G8400 PT W/DXA NO RESULTS DOC: HCPCS | Performed by: ORTHOPAEDIC SURGERY

## 2024-05-09 PROCEDURE — G8427 DOCREV CUR MEDS BY ELIG CLIN: HCPCS | Performed by: ORTHOPAEDIC SURGERY

## 2024-05-09 PROCEDURE — 99214 OFFICE O/P EST MOD 30 MIN: CPT | Performed by: ORTHOPAEDIC SURGERY

## 2024-05-09 PROCEDURE — 1123F ACP DISCUSS/DSCN MKR DOCD: CPT | Performed by: ORTHOPAEDIC SURGERY

## 2024-05-09 PROCEDURE — 1090F PRES/ABSN URINE INCON ASSESS: CPT | Performed by: ORTHOPAEDIC SURGERY

## 2024-05-09 NOTE — DISCHARGE INSTRUCTIONS
Operative arm in immobilizer at all times unless exercising as below or bathing/dressing. Keep arm close to side at all times, no reaching.   Nonweightbearing operative arm  Do Not remove glued Prineo dressing at shoulder This is waterproof and you can shower with it on     Codman exercises 3x/day as instructed  Followup Dr Sanchez in 2 weeks  [facility], [address], [address]   [address],   [phone]     CODMAN EXERCISES:  Side to Side Swing      With one hand placed on the surface of a table or chair bring both legs to a parallel position to each other,  bend the knees, and lean the torso forward letting the free arm dangle toward the floor.Begin to use the body to swing the arm side to side. As stated above, note this is to be done without use of the shoulder or surrounding muscles.    Back and Forth Swing    With one hand placed on the surface of a table or chair reach one leg back and bend the other knee and torso forward letting the free arm dangle toward the floor begin to move the body so that the free arm swings forward and backward.    Circular Swing    Using the same position as the back and forth swing shown above, begin to move the body so that the free arm swings in a [place].

## 2024-05-09 NOTE — PROGRESS NOTES
[facility]  Office Visit    Chief Complaint: Right shoulder pain    HPI:  Bernice Francisco is a 63 y.o. who is here in follow-up of right shoulder pain.  She scheduled for right reverse total shoulder arthroplasty next week.  For review, she is right-hand dominant.  She reports a multiple year history of right shoulder pain and loss of range of motion.  She does have a history of bilateral total knee arthroplasty and left reverse total shoulder arthroplasty with Dr. Huntley.  Her left shoulder was replaced in 2019.  She has had a subsequent dislocation and multiple subluxation events with the left shoulder.  She denies neck pain, numbness, tingling, pain that radiates down her arm to her fingers.  She has been treated in the past with steroid injections, but continues to have pain.  She rates pain as up to 10/10 at times.  She denies diabetes, heart or lung issues, blood thinner usage, tobacco use.  She does have a prothrombin gene mutation and has been on blood thinners in the past, but is not on any currently.  She has Eliquis on hand to take postoperatively.      Past Medical History:   Diagnosis Date    Acid reflux     Arthritis     Clotting disorder (HCC)     factor 2    Diverticulitis     GIST (gastrointestinal stromal tumor), non-malignant     Hypertension     Sleep apnea     on cpap    Thrombocytopenia (HCC)         ROS:  Constitutional: denies fever, chills, weight loss  MSK: denies pain in other joints, muscle aches  Neurological: denies numbness, tingling, weakness    Exam:  Appearance: sitting in exam room chair, appears to be in no acute distress, awake and alert  Resp: unlabored breathing on room air  Skin: warm, dry and intact with out erythema or significant increased temperature  RUE: Examination of the shoulder shows no gross defects. Active shoulder forward flexion is 120 degrees, external rotation 15 degrees at the neutral position, internal rotation to lumbar

## 2024-05-14 ENCOUNTER — ANESTHESIA EVENT (OUTPATIENT)
Dept: OPERATING ROOM | Age: 64
End: 2024-05-14
Payer: MEDICARE

## 2024-05-14 NOTE — DISCHARGE INSTR - COC
Postoperative Shoulder Replacement Surgery Instructions    Wound Care:  Cover the wound with a sterile gauze dressing and change daily as long as there is drainage.  Do not scrub wound.  Pat it dry with a soft towel.  Don’t apply any lotions, ointment,  or creams to your wound.  Check the incision every day for drainage or wound opening. Call for concerns  Diet:  You can resume your normal diet.  There are no limits on your diet due to your surgery.  Pain pills and activity changes may lead to constipation.  To prevent this, use prune juice or bran cereals liberally.  You may need to use a laxative such as Dulcolax, Senokot, or Milk of Magnesia.  Drink plenty of fluids.  Medications:  Take pain pills if needed to maintain comfort.  Never drive while taking pain medicine.  Avoid over the counter medications until checking with your doctor.  Resume previous medications as instructed by your doctor.  Activity:  Incentive spirometer 4 times a day for 10 breaths for one week post surgery  Wear BIPAP/CPAP at all times when sleeping or napping if you have sleep apnea AND have BIPAP/CPAP at home.     Call Your Doctor If:  You have increased pain not controlled by medications. OR need medication refills.   Excessive swelling in your hand .  You develop numbness, tingling, or decreased movement.  You have a fever greater than 100 degrees for a day or over 101 degrees at any one time.  If you fall.  You have any questions about your recovery.  Inform your family doctor/dentist or any other doctor who cares for you in the future that you have a joint replacement. You may need antibiotics for dental or surgical procedures if there is any evidence of infection present.  If you have required the use of insulin to control your blood sugar after surgery, follow up with your family doctor.  If you have any questions regarding your discharge instructions, please call Monisha Sellers Nurse Navigator [phone] or your surgeon's

## 2024-05-15 ENCOUNTER — APPOINTMENT (OUTPATIENT)
Dept: GENERAL RADIOLOGY | Age: 64
End: 2024-05-15
Attending: ORTHOPAEDIC SURGERY
Payer: MEDICARE

## 2024-05-15 ENCOUNTER — HOSPITAL ENCOUNTER (OUTPATIENT)
Age: 64
Setting detail: OUTPATIENT SURGERY
Discharge: HOME | End: 2024-05-15
Attending: ORTHOPAEDIC SURGERY | Admitting: ORTHOPAEDIC SURGERY
Payer: MEDICARE

## 2024-05-15 ENCOUNTER — ANESTHESIA (OUTPATIENT)
Dept: OPERATING ROOM | Age: 64
End: 2024-05-15
Payer: MEDICARE

## 2024-05-15 VITALS
RESPIRATION RATE: 18 BRPM | SYSTOLIC BLOOD PRESSURE: 100 MMHG | HEIGHT: 64 IN | BODY MASS INDEX: 35.23 KG/M2 | WEIGHT: 206.35 LBS | TEMPERATURE: 97.5 F | DIASTOLIC BLOOD PRESSURE: 60 MMHG | OXYGEN SATURATION: 93 % | HEART RATE: 81 BPM

## 2024-05-15 DIAGNOSIS — Z96.612 S/P REVERSE TOTAL SHOULDER ARTHROPLASTY, LEFT: Primary | ICD-10-CM

## 2024-05-15 LAB
ABO + RH BLD: NORMAL
BLD GP AB SCN SERPL QL: NORMAL
GLUCOSE BLD-MCNC: 164 MG/DL (ref 70–99)
PERFORMED ON: ABNORMAL

## 2024-05-15 PROCEDURE — 6360000002 HC RX W HCPCS: Performed by: ORTHOPAEDIC SURGERY

## 2024-05-15 PROCEDURE — 86900 BLOOD TYPING SEROLOGIC ABO: CPT

## 2024-05-15 PROCEDURE — 6370000000 HC RX 637 (ALT 250 FOR IP): Performed by: ORTHOPAEDIC SURGERY

## 2024-05-15 PROCEDURE — 3700000001 HC ADD 15 MINUTES (ANESTHESIA): Performed by: ORTHOPAEDIC SURGERY

## 2024-05-15 PROCEDURE — 6360000002 HC RX W HCPCS: Performed by: STUDENT IN AN ORGANIZED HEALTH CARE EDUCATION/TRAINING PROGRAM

## 2024-05-15 PROCEDURE — 86901 BLOOD TYPING SEROLOGIC RH(D): CPT

## 2024-05-15 PROCEDURE — 97535 SELF CARE MNGMENT TRAINING: CPT

## 2024-05-15 PROCEDURE — 64415 NJX AA&/STRD BRCH PLXS IMG: CPT | Performed by: STUDENT IN AN ORGANIZED HEALTH CARE EDUCATION/TRAINING PROGRAM

## 2024-05-15 PROCEDURE — 23472 RECONSTRUCT SHOULDER JOINT: CPT | Performed by: PHYSICIAN ASSISTANT

## 2024-05-15 PROCEDURE — A4217 STERILE WATER/SALINE, 500 ML: HCPCS | Performed by: ORTHOPAEDIC SURGERY

## 2024-05-15 PROCEDURE — 2580000003 HC RX 258: Performed by: ORTHOPAEDIC SURGERY

## 2024-05-15 PROCEDURE — 73020 X-RAY EXAM OF SHOULDER: CPT

## 2024-05-15 PROCEDURE — 2580000003 HC RX 258: Performed by: NURSE PRACTITIONER

## 2024-05-15 PROCEDURE — 6370000000 HC RX 637 (ALT 250 FOR IP): Performed by: NURSE PRACTITIONER

## 2024-05-15 PROCEDURE — 2580000003 HC RX 258: Performed by: ANESTHESIOLOGY

## 2024-05-15 PROCEDURE — 7100000010 HC PHASE II RECOVERY - FIRST 15 MIN: Performed by: ORTHOPAEDIC SURGERY

## 2024-05-15 PROCEDURE — 3600000015 HC SURGERY LEVEL 5 ADDTL 15MIN: Performed by: ORTHOPAEDIC SURGERY

## 2024-05-15 PROCEDURE — 7100000001 HC PACU RECOVERY - ADDTL 15 MIN: Performed by: ORTHOPAEDIC SURGERY

## 2024-05-15 PROCEDURE — 23472 RECONSTRUCT SHOULDER JOINT: CPT | Performed by: ORTHOPAEDIC SURGERY

## 2024-05-15 PROCEDURE — 7100000000 HC PACU RECOVERY - FIRST 15 MIN: Performed by: ORTHOPAEDIC SURGERY

## 2024-05-15 PROCEDURE — 2709999900 HC NON-CHARGEABLE SUPPLY: Performed by: ORTHOPAEDIC SURGERY

## 2024-05-15 PROCEDURE — C1776 JOINT DEVICE (IMPLANTABLE): HCPCS | Performed by: ORTHOPAEDIC SURGERY

## 2024-05-15 PROCEDURE — 2500000003 HC RX 250 WO HCPCS

## 2024-05-15 PROCEDURE — 97110 THERAPEUTIC EXERCISES: CPT

## 2024-05-15 PROCEDURE — 6360000002 HC RX W HCPCS: Performed by: NURSE PRACTITIONER

## 2024-05-15 PROCEDURE — 3700000000 HC ANESTHESIA ATTENDED CARE: Performed by: ORTHOPAEDIC SURGERY

## 2024-05-15 PROCEDURE — 2500000003 HC RX 250 WO HCPCS: Performed by: STUDENT IN AN ORGANIZED HEALTH CARE EDUCATION/TRAINING PROGRAM

## 2024-05-15 PROCEDURE — 86850 RBC ANTIBODY SCREEN: CPT

## 2024-05-15 PROCEDURE — 7100000011 HC PHASE II RECOVERY - ADDTL 15 MIN: Performed by: ORTHOPAEDIC SURGERY

## 2024-05-15 PROCEDURE — 3600000005 HC SURGERY LEVEL 5 BASE: Performed by: ORTHOPAEDIC SURGERY

## 2024-05-15 PROCEDURE — 6360000002 HC RX W HCPCS

## 2024-05-15 PROCEDURE — 97166 OT EVAL MOD COMPLEX 45 MIN: CPT

## 2024-05-15 DEVICE — GUIDEWIRE ORTHOPEDIC 2.5X220 MM GLEN AEQUALIS PERFORM: Type: IMPLANTABLE DEVICE | Site: SHOULDER | Status: FUNCTIONAL

## 2024-05-15 DEVICE — SCREW BONE L35MM DIA6.5MM TI ST FULL THRD CTRL FOR GLEN: Type: IMPLANTABLE DEVICE | Site: SHOULDER | Status: FUNCTIONAL

## 2024-05-15 DEVICE — IMPLANTABLE DEVICE
Type: IMPLANTABLE DEVICE | Site: SHOULDER | Status: FUNCTIONAL
Brand: TORNIER FLEX SHOULDER SYSTEM

## 2024-05-15 DEVICE — IMPLANTABLE DEVICE
Type: IMPLANTABLE DEVICE | Site: SHOULDER | Status: FUNCTIONAL
Brand: TORNIER PERFORM™ REVERSED

## 2024-05-15 DEVICE — IMPLANTABLE DEVICE
Type: IMPLANTABLE DEVICE | Site: SHOULDER | Status: FUNCTIONAL
Brand: TORNIER PERFORM® REVERSED GLENOID

## 2024-05-15 DEVICE — SCREW BONE L34MM DIA5MM TI ST FULL THRD PERIPH FOR GLEN: Type: IMPLANTABLE DEVICE | Site: SHOULDER | Status: FUNCTIONAL

## 2024-05-15 RX ORDER — FENTANYL CITRATE 0.05 MG/ML
25 INJECTION, SOLUTION INTRAMUSCULAR; INTRAVENOUS EVERY 5 MIN PRN
Status: DISCONTINUED | OUTPATIENT
Start: 2024-05-15 | End: 2024-05-15 | Stop reason: HOSPADM

## 2024-05-15 RX ORDER — LIDOCAINE HYDROCHLORIDE 20 MG/ML
INJECTION, SOLUTION EPIDURAL; INFILTRATION; INTRACAUDAL; PERINEURAL PRN
Status: DISCONTINUED | OUTPATIENT
Start: 2024-05-15 | End: 2024-05-15 | Stop reason: SDUPTHER

## 2024-05-15 RX ORDER — ACETAMINOPHEN 500 MG
1000 TABLET ORAL ONCE
Status: COMPLETED | OUTPATIENT
Start: 2024-05-15 | End: 2024-05-15

## 2024-05-15 RX ORDER — ONDANSETRON 2 MG/ML
4 INJECTION INTRAMUSCULAR; INTRAVENOUS
Status: DISCONTINUED | OUTPATIENT
Start: 2024-05-15 | End: 2024-05-15 | Stop reason: HOSPADM

## 2024-05-15 RX ORDER — DOXYCYCLINE HYCLATE 100 MG/1
100 CAPSULE ORAL SEE ADMIN INSTRUCTIONS
Qty: 2 CAPSULE | Refills: 0 | Status: SHIPPED | OUTPATIENT
Start: 2024-05-15

## 2024-05-15 RX ORDER — SODIUM CHLORIDE 0.9 % (FLUSH) 0.9 %
5-40 SYRINGE (ML) INJECTION EVERY 12 HOURS SCHEDULED
Status: DISCONTINUED | OUTPATIENT
Start: 2024-05-15 | End: 2024-05-15

## 2024-05-15 RX ORDER — RIVAROXABAN 10 MG/1
10 TABLET, FILM COATED ORAL
Qty: 14 TABLET | Refills: 0 | Status: SHIPPED | OUTPATIENT
Start: 2024-05-15 | End: 2024-05-29

## 2024-05-15 RX ORDER — BUPIVACAINE HYDROCHLORIDE AND EPINEPHRINE 5; 5 MG/ML; UG/ML
INJECTION, SOLUTION EPIDURAL; INTRACAUDAL; PERINEURAL
Status: COMPLETED | OUTPATIENT
Start: 2024-05-15 | End: 2024-05-15

## 2024-05-15 RX ORDER — BUPIVACAINE HYDROCHLORIDE 5 MG/ML
INJECTION, SOLUTION EPIDURAL; INTRACAUDAL
Status: COMPLETED | OUTPATIENT
Start: 2024-05-15 | End: 2024-05-15

## 2024-05-15 RX ORDER — HYDROCODONE BITARTRATE AND ACETAMINOPHEN 5; 325 MG/1; MG/1
1 TABLET ORAL ONCE
Status: DISCONTINUED | OUTPATIENT
Start: 2024-05-15 | End: 2024-05-15 | Stop reason: HOSPADM

## 2024-05-15 RX ORDER — SODIUM CHLORIDE 9 MG/ML
INJECTION, SOLUTION INTRAVENOUS PRN
Status: DISCONTINUED | OUTPATIENT
Start: 2024-05-15 | End: 2024-05-15 | Stop reason: HOSPADM

## 2024-05-15 RX ORDER — ROCURONIUM BROMIDE 10 MG/ML
INJECTION, SOLUTION INTRAVENOUS PRN
Status: DISCONTINUED | OUTPATIENT
Start: 2024-05-15 | End: 2024-05-15 | Stop reason: SDUPTHER

## 2024-05-15 RX ORDER — ACETAMINOPHEN 325 MG/1
650 TABLET ORAL ONCE
Status: COMPLETED | OUTPATIENT
Start: 2024-05-15 | End: 2024-05-15

## 2024-05-15 RX ORDER — DEXAMETHASONE SODIUM PHOSPHATE 4 MG/ML
INJECTION, SOLUTION INTRA-ARTICULAR; INTRALESIONAL; INTRAMUSCULAR; INTRAVENOUS; SOFT TISSUE
Status: COMPLETED | OUTPATIENT
Start: 2024-05-15 | End: 2024-05-15

## 2024-05-15 RX ORDER — PROCHLORPERAZINE EDISYLATE 5 MG/ML
5 INJECTION INTRAMUSCULAR; INTRAVENOUS
Status: DISCONTINUED | OUTPATIENT
Start: 2024-05-15 | End: 2024-05-15 | Stop reason: HOSPADM

## 2024-05-15 RX ORDER — DEXAMETHASONE SODIUM PHOSPHATE 4 MG/ML
INJECTION, SOLUTION INTRA-ARTICULAR; INTRALESIONAL; INTRAMUSCULAR; INTRAVENOUS; SOFT TISSUE PRN
Status: DISCONTINUED | OUTPATIENT
Start: 2024-05-15 | End: 2024-05-15 | Stop reason: SDUPTHER

## 2024-05-15 RX ORDER — SODIUM CHLORIDE 0.9 % (FLUSH) 0.9 %
5-40 SYRINGE (ML) INJECTION PRN
Status: DISCONTINUED | OUTPATIENT
Start: 2024-05-15 | End: 2024-05-15

## 2024-05-15 RX ORDER — ONDANSETRON 2 MG/ML
INJECTION INTRAMUSCULAR; INTRAVENOUS PRN
Status: DISCONTINUED | OUTPATIENT
Start: 2024-05-15 | End: 2024-05-15 | Stop reason: SDUPTHER

## 2024-05-15 RX ORDER — HYDROCODONE BITARTRATE AND ACETAMINOPHEN 5; 325 MG/1; MG/1
2 TABLET ORAL ONCE
Status: DISCONTINUED | OUTPATIENT
Start: 2024-05-15 | End: 2024-05-15 | Stop reason: HOSPADM

## 2024-05-15 RX ORDER — HYDROCODONE BITARTRATE AND ACETAMINOPHEN 5; 325 MG/1; MG/1
1 TABLET ORAL EVERY 4 HOURS PRN
Qty: 30 TABLET | Refills: 0 | Status: SHIPPED | OUTPATIENT
Start: 2024-05-15 | End: 2024-05-20

## 2024-05-15 RX ORDER — LORAZEPAM 2 MG/ML
1 INJECTION INTRAMUSCULAR
Status: COMPLETED | OUTPATIENT
Start: 2024-05-15 | End: 2024-05-15

## 2024-05-15 RX ORDER — FENTANYL CITRATE 50 UG/ML
INJECTION, SOLUTION INTRAMUSCULAR; INTRAVENOUS PRN
Status: DISCONTINUED | OUTPATIENT
Start: 2024-05-15 | End: 2024-05-15 | Stop reason: SDUPTHER

## 2024-05-15 RX ORDER — IPRATROPIUM BROMIDE AND ALBUTEROL SULFATE 2.5; .5 MG/3ML; MG/3ML
1 SOLUTION RESPIRATORY (INHALATION)
Status: DISCONTINUED | OUTPATIENT
Start: 2024-05-15 | End: 2024-05-15 | Stop reason: HOSPADM

## 2024-05-15 RX ORDER — MELOXICAM 7.5 MG/1
15 TABLET ORAL ONCE
Status: COMPLETED | OUTPATIENT
Start: 2024-05-15 | End: 2024-05-15

## 2024-05-15 RX ORDER — TRANEXAMIC ACID 650 MG/1
1950 TABLET ORAL ONCE
Status: COMPLETED | OUTPATIENT
Start: 2024-05-15 | End: 2024-05-15

## 2024-05-15 RX ORDER — SODIUM CHLORIDE 0.9 % (FLUSH) 0.9 %
5-40 SYRINGE (ML) INJECTION EVERY 12 HOURS SCHEDULED
Status: DISCONTINUED | OUTPATIENT
Start: 2024-05-15 | End: 2024-05-15 | Stop reason: HOSPADM

## 2024-05-15 RX ORDER — PROPOFOL 10 MG/ML
INJECTION, EMULSION INTRAVENOUS PRN
Status: DISCONTINUED | OUTPATIENT
Start: 2024-05-15 | End: 2024-05-15 | Stop reason: SDUPTHER

## 2024-05-15 RX ORDER — SODIUM CHLORIDE 9 MG/ML
INJECTION, SOLUTION INTRAVENOUS PRN
Status: DISCONTINUED | OUTPATIENT
Start: 2024-05-15 | End: 2024-05-15

## 2024-05-15 RX ORDER — SODIUM CHLORIDE 0.9 % (FLUSH) 0.9 %
5-40 SYRINGE (ML) INJECTION PRN
Status: DISCONTINUED | OUTPATIENT
Start: 2024-05-15 | End: 2024-05-15 | Stop reason: HOSPADM

## 2024-05-15 RX ORDER — MIDAZOLAM HYDROCHLORIDE 1 MG/ML
INJECTION INTRAMUSCULAR; INTRAVENOUS
Status: COMPLETED | OUTPATIENT
Start: 2024-05-15 | End: 2024-05-15

## 2024-05-15 RX ORDER — MAGNESIUM HYDROXIDE 1200 MG/15ML
LIQUID ORAL CONTINUOUS PRN
Status: COMPLETED | OUTPATIENT
Start: 2024-05-15 | End: 2024-05-15

## 2024-05-15 RX ORDER — DULOXETIN HYDROCHLORIDE 60 MG/1
60 CAPSULE, DELAYED RELEASE ORAL ONCE
Status: COMPLETED | OUTPATIENT
Start: 2024-05-15 | End: 2024-05-15

## 2024-05-15 RX ORDER — FENTANYL CITRATE 0.05 MG/ML
50 INJECTION, SOLUTION INTRAMUSCULAR; INTRAVENOUS EVERY 5 MIN PRN
Status: DISCONTINUED | OUTPATIENT
Start: 2024-05-15 | End: 2024-05-15 | Stop reason: HOSPADM

## 2024-05-15 RX ORDER — HYDRALAZINE HYDROCHLORIDE 20 MG/ML
10 INJECTION INTRAMUSCULAR; INTRAVENOUS
Status: DISCONTINUED | OUTPATIENT
Start: 2024-05-15 | End: 2024-05-15 | Stop reason: HOSPADM

## 2024-05-15 RX ORDER — SUCCINYLCHOLINE/SOD CL,ISO/PF 200MG/10ML
SYRINGE (ML) INTRAVENOUS PRN
Status: DISCONTINUED | OUTPATIENT
Start: 2024-05-15 | End: 2024-05-15 | Stop reason: SDUPTHER

## 2024-05-15 RX ORDER — LABETALOL HYDROCHLORIDE 5 MG/ML
10 INJECTION, SOLUTION INTRAVENOUS
Status: DISCONTINUED | OUTPATIENT
Start: 2024-05-15 | End: 2024-05-15 | Stop reason: HOSPADM

## 2024-05-15 RX ADMIN — FENTANYL CITRATE 50 MCG: 0.05 INJECTION, SOLUTION INTRAMUSCULAR; INTRAVENOUS at 09:28

## 2024-05-15 RX ADMIN — MELOXICAM 15 MG: 7.5 TABLET ORAL at 06:44

## 2024-05-15 RX ADMIN — PROPOFOL 160 MG: 10 INJECTION, EMULSION INTRAVENOUS at 07:40

## 2024-05-15 RX ADMIN — DULOXETINE HYDROCHLORIDE 60 MG: 60 CAPSULE, DELAYED RELEASE ORAL at 06:44

## 2024-05-15 RX ADMIN — SUGAMMADEX 200 MG: 100 INJECTION, SOLUTION INTRAVENOUS at 08:48

## 2024-05-15 RX ADMIN — SODIUM CHLORIDE 2000 MG: 900 INJECTION INTRAVENOUS at 12:33

## 2024-05-15 RX ADMIN — FENTANYL CITRATE 50 MCG: 0.05 INJECTION, SOLUTION INTRAMUSCULAR; INTRAVENOUS at 09:16

## 2024-05-15 RX ADMIN — MIDAZOLAM 2 MG: 1 INJECTION INTRAMUSCULAR; INTRAVENOUS at 07:10

## 2024-05-15 RX ADMIN — ACETAMINOPHEN 650 MG: 325 TABLET ORAL at 12:33

## 2024-05-15 RX ADMIN — BUPIVACAINE HYDROCHLORIDE 12.5 ML: 5 INJECTION, SOLUTION EPIDURAL; INTRACAUDAL; PERINEURAL at 07:13

## 2024-05-15 RX ADMIN — BUPIVACAINE HYDROCHLORIDE AND EPINEPHRINE BITARTRATE 12.5 ML: 5; .005 INJECTION, SOLUTION EPIDURAL; INTRACAUDAL; PERINEURAL at 07:13

## 2024-05-15 RX ADMIN — SODIUM CHLORIDE 2000 MG: 900 INJECTION INTRAVENOUS at 07:48

## 2024-05-15 RX ADMIN — Medication 140 MG: at 07:40

## 2024-05-15 RX ADMIN — LORAZEPAM 1 MG: 2 INJECTION INTRAMUSCULAR; INTRAVENOUS at 09:36

## 2024-05-15 RX ADMIN — ACETAMINOPHEN 1000 MG: 500 TABLET ORAL at 06:44

## 2024-05-15 RX ADMIN — SODIUM CHLORIDE: 9 INJECTION, SOLUTION INTRAVENOUS at 07:35

## 2024-05-15 RX ADMIN — DEXAMETHASONE SODIUM PHOSPHATE 8 MG: 4 INJECTION, SOLUTION INTRAMUSCULAR; INTRAVENOUS at 08:01

## 2024-05-15 RX ADMIN — ROCURONIUM BROMIDE 30 MG: 10 SOLUTION INTRAVENOUS at 07:48

## 2024-05-15 RX ADMIN — TRANEXAMIC ACID 1950 MG: 650 TABLET ORAL at 06:44

## 2024-05-15 RX ADMIN — LIDOCAINE HYDROCHLORIDE 100 MG: 20 INJECTION, SOLUTION EPIDURAL; INFILTRATION; INTRACAUDAL; PERINEURAL at 07:40

## 2024-05-15 RX ADMIN — ONDANSETRON 4 MG: 2 INJECTION INTRAMUSCULAR; INTRAVENOUS at 08:39

## 2024-05-15 RX ADMIN — PROPOFOL 40 MG: 10 INJECTION, EMULSION INTRAVENOUS at 08:04

## 2024-05-15 RX ADMIN — FENTANYL CITRATE 50 MCG: 50 INJECTION INTRAMUSCULAR; INTRAVENOUS at 07:40

## 2024-05-15 RX ADMIN — ROCURONIUM BROMIDE 20 MG: 10 SOLUTION INTRAVENOUS at 07:56

## 2024-05-15 RX ADMIN — DEXAMETHASONE SODIUM PHOSPHATE 2 MG: 4 INJECTION, SOLUTION INTRAMUSCULAR; INTRAVENOUS at 07:13

## 2024-05-15 ASSESSMENT — PAIN SCALES - GENERAL
PAINLEVEL_OUTOF10: 4
PAINLEVEL_OUTOF10: 9
PAINLEVEL_OUTOF10: 0
PAINLEVEL_OUTOF10: 10
PAINLEVEL_OUTOF10: 0
PAINLEVEL_OUTOF10: 9
PAINLEVEL_OUTOF10: 0
PAINLEVEL_OUTOF10: 0

## 2024-05-15 ASSESSMENT — PAIN DESCRIPTION - ONSET
ONSET: ON-GOING

## 2024-05-15 ASSESSMENT — PAIN DESCRIPTION - DESCRIPTORS
DESCRIPTORS: ACHING
DESCRIPTORS: ACHING;SHOOTING;SHARP
DESCRIPTORS: ACHING
DESCRIPTORS: ACHING

## 2024-05-15 ASSESSMENT — PAIN DESCRIPTION - LOCATION
LOCATION: SHOULDER

## 2024-05-15 ASSESSMENT — PAIN - FUNCTIONAL ASSESSMENT
PAIN_FUNCTIONAL_ASSESSMENT: PREVENTS OR INTERFERES SOME ACTIVE ACTIVITIES AND ADLS
PAIN_FUNCTIONAL_ASSESSMENT: PREVENTS OR INTERFERES WITH MANY ACTIVE NOT PASSIVE ACTIVITIES
PAIN_FUNCTIONAL_ASSESSMENT: PREVENTS OR INTERFERES SOME ACTIVE ACTIVITIES AND ADLS
PAIN_FUNCTIONAL_ASSESSMENT: PREVENTS OR INTERFERES SOME ACTIVE ACTIVITIES AND ADLS

## 2024-05-15 ASSESSMENT — PAIN DESCRIPTION - FREQUENCY
FREQUENCY: CONTINUOUS

## 2024-05-15 ASSESSMENT — LIFESTYLE VARIABLES: SMOKING_STATUS: 0

## 2024-05-15 ASSESSMENT — PAIN DESCRIPTION - ORIENTATION
ORIENTATION: RIGHT

## 2024-05-15 ASSESSMENT — PAIN DESCRIPTION - PAIN TYPE
TYPE: CHRONIC PAIN
TYPE: SURGICAL PAIN

## 2024-05-15 ASSESSMENT — ENCOUNTER SYMPTOMS: SHORTNESS OF BREATH: 0

## 2024-05-15 NOTE — ANESTHESIA PRE PROCEDURE
denied any history of coagulopathy (daughter hypercoagulable); Eliquis ordered by hematologist to begin short course following surgery)                ROS comment: CT Right Shoulder  Impression:  1. Severe right glenohumeral osteoarthrosis.  2. Punctate loose body measuring 2 mm along the inferior glenohumeral joint.  3. Mild degenerative change of the right AC joint.  4. Mild dependent atelectasis and respiratory motion.  5. Moderate atrophy and fatty degeneration of supraspinatus muscle.   Abdominal:   (+) obese    Abdomen: soft.      Vascular:          Other Findings: Pleasant. Excellent historian. Very anxious.            Anesthesia Plan      general and regional     ASA 2     (NPO appropriate. Ms. Francisco denies active nausea / reflux. Plan GETA + right interscalene. 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 )  Induction: intravenous.    MIPS: Postoperative opioids intended and Prophylactic antiemetics administered.  Anesthetic plan and risks discussed with patient (spouse at bedside).    Use of blood products discussed with patient whom consented to blood products.    Plan discussed with CRNA.            This pre-anesthesia assessment may be used as a history and physical.    DOS STAFF ADDENDUM:    Pt seen and examined, chart reviewed (including anesthesia, drug and allergy history).  No interval changes to history and physical examination.  Anesthetic plan, risks, benefits, alternatives, and personnel involved discussed with patient.  Patient verbalized an understanding and agrees to proceed.      Sohail Swenson MD  May 15, 2024  6:58 AM

## 2024-05-15 NOTE — OP NOTE
Patient: Bernice Francisco  YOB: 1960  MRN: [id number]    DATE OF PROCEDURE: 5/15/2024      PREOPERATIVE DIAGNOSES:  Right shoulder rotator cuff tear arthropathy     POSTOPERATIVE DIAGNOSES:   Right shoulder rotator cuff tear arthropathy     OPERATION PERFORMED:  Right reverse total shoulder arthroplasty     SURGEON:  Chaitanya Sanchez MD     ASSISTANTS:  REESE Dave     BLOOD LOSS: 100 mL     IMPLANTS:  Tornier Shoulder System  25 mm standard baseplate with 35 mm central screw and 2 peripheral locking screws  36 mm standard glenosphere  Size 4 long flex stem  +0mm high offset tray with 36 + 9 mm B retentive poly     INDICATIONS:  The patient is a 63 y.o. female with chronic right shoulder pain due to rotator cuff tear arthropathy. The patient comes now for right shoulder replacement.  The patient has been treated with anti-inflammatories, physical therapy and intra-articular steroid injections; none of these provided any long-term relief. We discussed treatment options and alternatives in detail. The patient understands the risks involved including but not limited to: Infection, vessel injury, nerve injury, implant loosening, need for revision surgery, dislocation, intraoperative fracture, and persistent pain. Informed consent for surgery was signed by the patient.     OPERATIVE PROCEDURE: The patient was seen in the preoperative holding area where the site of surgery was marked and informed consent was confirmed.  The patient was brought back to the operating room by OR personnel. General anesthesia was administered. The patient was placed in a beach chair position. The right upper extremity was then prepped and draped in a standard and sterile fashion.  A final and formal timeout was then performed which confirmed the correct patient, correct position, and correct site of surgery. IV antibiotics were administered within 1 hour of the skin incision.     An anterior incision was made. Skin and

## 2024-05-15 NOTE — PLAN OF CARE
Problem: Discharge Planning  Goal: Discharge to home or other facility with appropriate resources  Outcome: Adequate for Discharge  Flowsheets (Taken 5/15/2024 1521)  Discharge to home or other facility with appropriate resources:   Identify barriers to discharge with patient and caregiver   Identify discharge learning needs (meds, wound care, etc)   Refer to discharge planning if patient needs post-hospital services based on physician order or complex needs related to functional status, cognitive ability or social support system   Arrange for needed discharge resources and transportation as appropriate     Problem: Chronic Conditions and Co-morbidities  Goal: Patient's chronic conditions and co-morbidity symptoms are monitored and maintained or improved  Outcome: Adequate for Discharge  Flowsheets (Taken 5/15/2024 1521)  Care Plan - Patient's Chronic Conditions and Co-Morbidity Symptoms are Monitored and Maintained or Improved: Monitor and assess patient's chronic conditions and comorbid symptoms for stability, deterioration, or improvement     Problem: Neurosensory - Adult  Goal: Achieves stable or improved neurological status  Outcome: Adequate for Discharge  Flowsheets (Taken 5/15/2024 1521)  Achieves stable or improved neurological status: Assess for and report changes in neurological status     Problem: Skin/Tissue Integrity - Adult  Goal: Incisions, wounds, or drain sites healing without S/S of infection  Outcome: Adequate for Discharge  Flowsheets (Taken 5/15/2024 1521)  Incisions, Wounds, or Drain Sites Healing Without Sign and Symptoms of Infection: TWICE DAILY: Assess and document dressing/incision, wound bed, drain sites and surrounding tissue     Problem: Musculoskeletal - Adult  Goal: Return mobility to safest level of function  Outcome: Adequate for Discharge  Flowsheets (Taken 5/15/2024 1521)  Return Mobility to Safest Level of Function:   Assess patient stability and activity tolerance for

## 2024-05-15 NOTE — PROGRESS NOTES
PRE-OP INSTRUCTIONS     Do not eat or drink anything after 12:00 midnight prior to surgery or per Dr Sanchez  This includes water, chewing gum, mints and ice chips.    You may brush your teeth and gargle the morning of surgery but DO  NOT SWALLOW THE WATER.    Take the following medications with a small sip of water on the morning of procedure...Follow your MD/Surgeons pre-procedure instructions regarding your medications     You may be asked to stop blood thinners such as:  Coumadin, Plavix, Fragmin and lovenox.  Please check with your doctor before stopping these or any other medications.    Aspirin, ibuprofen, advil and naproxen, any anti-inflammatory products should be stopped for a week prior to your surgery.    Do not smoke and do not drink any alcoholic beverages 24 hours prior to your surgery.    Please do not wear any jewelry or body piercings on the day of surgery.    Please wear something simple, loose fitting clothing to the hospital.  Do not wear any make-up(including eye make-up) or nail polish on your fingers and toes.    As part of our patient safety program to minimize surgical infections, we ask you to do the following:    Please notify your surgeon if you develop any illness between now and the day of your surgery.  This includes a cough, cold, fever, sore  throat, nausea, vomiting, diarrhea, etc.   Please notify your surgeon if you experience dizziness, shortness of  breath or blurred vision between now and the time of your surgery.     Please notify your surgeon of any open or redden areas that may look infected       DO NOT shave your operative site 96 hours(four days) prior to surgery.          Shower the week before surgery with an antibacterial soap such as:dial,safeguard, etc.       Three(3) days prior to your surgery, cleanse the operative site with Hibiclens(anti-microbial soap). This soap may dry your skin, please do not apply any oils or lotions     Please bring your insurance card and 
    [facility] Pre-Admission Testing Electronic Communication Worksheet for OR/ENDO Procedures        Patient: Bernice Francisco    Transportation Confirmed: [x] YES    []  NO    History and Physical:  [x] YES    []  NO  [] N/A  If yes, please list doctor or Urgent Care and date of H&P: 4/24-Dr Lopez    Additional Clearance(Cardiac, Pulmonary, etc):  [] YES    []  NO  Called Dr Crnae`s office [phone] spoke with Alice , she will have clearance faxed upon completion.    Pre-Admission Testing Visit:  [x] YES    []  NO If no, do labs/testing need to be done DOS?  [] YES    []  NO    Medication Reconciliation Complete:  [x] YES    []  NO        Additional Notes:                Interview Complete: [x] YES    []  NO          Steffi Reyes, RN  10:24 AM  
 Follow Up Prior to Surgery    DOS: 5/15  :1960    Other Clearances: Called Dr Crane`s office [phone] spoke with Alice , she will have clearance faxed upon completion. Received clearance to be scanned into EPIC.        
 at bedside. Pt tolerating snack, denies nausea. Meal order placed. Pt denies any needs at this time. Pt educated to hit call light if she needs anything. Pt verbalizes understanding.  
Data- Discharge order received, patient verbalized agreement to discharge, disposition to previous residence, no needs noted for Home Health Care and Durable Medical Equipment needed.    Action- discharge instructions prepared and a hardcopy of AVS instructions given to patient and spouse Davin , patient and Davin verbalized understanding. Medication information packet given related to NEW and/or CHANGED prescriptions emphasizing name/purpose/side effects, pt verbalized understanding. Discharge instruction summary: Diet- General, Activity- Non-weight Bearing to right shoulder , Primary Care Physician as follows: Devora Lopez [phone]. Follow up appointment with orthopedic office noted below, immunizations reviewed and discussed with patient, prescription medications to be filled by retail pharmacy and then delivered. Inpatient surgical procedure precautions reviewed:  Codman exercises . Educated patient on using incentive spirometer post-discharge per surgeon's instructions. Neurovascular checks performed and weak right hand  noted, moderate left hand  noted , fingers appear appropriate to ethnicity in color, Warm to touch, patient reports numbness in right hand thumb and index finger only .Right Shoulder outer dressing removed per Khalida Tsai NP verbal order and Incision site prineo glue dressing assessed and is dry and intact with serosanguinous scant  amount of drainage noted. Dry gauze dressing and tape applied, I provided patient with wound dressing supplies and instructions. patient's bedside RN Addie notified of patient completing discharge instructions. Nurse Navigator and Orthopedic Office contact information on discharge instructions and provided to patient.    Response- Prescriptions delivered to patient via meds to bed program. Disposition is home with Davin. Patient to be transported by Davin .    Future Appointments   Date Time Provider Department Center 
Huddle performed this morning including Nurse navigator Monisha, and Occupational therapist Geno. Discussed plan of care, discharge plan, and dme needs if applicable for orthopedic total joint patient. I notified Divya  of same day discharge order.    
PACU Transfer to \[facility]\""    Vitals:    05/15/24 1000   BP: 119/68   Pulse: 75   Resp: 20   Temp:    SpO2: 94%         Intake/Output Summary (Last 24 hours) at 5/15/2024 1006  Last data filed at 5/15/2024 0900  Gross per 24 hour   Intake 550 ml   Output --   Net 550 ml       Pain assessment:  none  Pain Level: 0    Patient transferred to care of \[facility]\"" RN.    5/15/2024 10:06 AM     
Patient admitted to PACU # 4 from OR at 0901 post right reverse total shoulder replacement per MD Sanchez.  Attached to PACU monitoring system and report received from anesthesia provider.  Patient was reported to be hemodynamically stable during procedure.  Patient drowsy on admission and denied pain. Dressing is clean, dry, and intact. Ice and sling in place. Will continue to monitor.       
Patient to Phase 2 from PACU. Pt awake but drowsy. VS stable (see flowsheet) on room air. Patient's breathing regular and unlabored, denies pain. Surgical dressing clean, dry and intact x1. Call light within reach. Snack provided.     
[facility], patient reports she has xarelto at home already and does not want to take/purchase xalreto ordered today. This RN spoke to patient and she is unsure if she has Eliquis or Xarelto at home. Per Dr Sanchez's note eliquis is at home. I spoke to Pharmacy tech Geovani at patient's [facility] pharmacy and verified patient had Eliquis 2.5mg, quantity of 20 tablets filled on 4/8/2024. I notified Khalida Tsai NP of all the updates. Per Khalida Tsai NP patient will discharge on xarelto 10mg daily for 14 days postop. Patient was instructed to notify Dr Andrews of the update on Xarelto.   I also sent an in-basket message to Dr Andrews updating him on prescribed anticoagulant at discharge.         
The patients OARRS report has been reviewed online and any prescribing of pain related medications is based on our findings.The patient has been issued narcotics to safely reduce postoperative pain and promote tolerance with physical therapies and ADL's. Reduction in dosing will be addressed with the next narcotic refill request. Dosing is adjusted for patients with history of chronic pain disorders.    
Contact guard assistance;Stand by assistance  Transfer Comments: Pt tx from edge of stretcher bed, <> commode and <> recliner CGA/SBA, demo good awareness of RUE NWB status throughout all txs this date.    Vision  Vision: Within Functional Limits  Hearing  Hearing: Within functional limits  Cognition  Overall Cognitive Status: WFL  Orientation  Overall Orientation Status: Within Functional Limits                 A/AROM Exercises: Pt ed on RUE HEP to address ROM for functional performance prior to followup with Dr. Sanchez, pt performed exercises seated in recliner with cues/demo: composite digit flex/ext, thumb opposition, wrist AROM flex/ext, forearm AROM pronation/supination, elbow AROM flex/ext, pendulums (front to back, side to side, and circular) x5 reps via L hand this date d/t R hand numb following surgery (nerve block). Pt verbalize and demo good understanding of provided information, report no questions regarding HEP.    Static Sitting Balance: seated in recliner SBA/sup  Dynamic Sitting Balance: seated in recliner throughout dressing tasks SBA  Static Standing Balance: in stance CGA/SBA  Dynamic Standing Balance: in stance CGA/min A throughout functional mobility progressing to SBA as session progressed    Education Given To: Patient;Family  Education Provided: Role of Therapy;Plan of Care;Precautions;Home Exercise Program;ADL Adaptive Strategies;Transfer Training;Fall Prevention Strategies;Mobility Training;Equipment  Education Provided Comments: RUE in sling and associated restrictions, sling wearing schedule, seated bathing/dressing for safety, positioning of RUE in sling/at rest/sleep, UB dressing technique, importance of ice/rest, plan of care  Education Method: Verbal;Printed Information/Hand-outs;Teach Back;Demonstration  Barriers to Learning: None  Education Outcome: Verbalized understanding;Demonstrated understanding;Continued education needed                    AM-PAC - ADL  AM-PAC Daily Activity -

## 2024-05-15 NOTE — H&P
Update History & Physical    The patient's History and Physical of April 24, 2024 was reviewed with the patient and I examined the patient. There was no change. The surgical site was confirmed by the patient and me.       Plan: The risks, benefits, expected outcome, and alternative to the recommended procedure have been discussed with the patient. Patient understands and wants to proceed with the procedure.     Electronically signed by LEA WINTER MD on 5/15/2024 at 8:52 AM

## 2024-05-15 NOTE — ANESTHESIA POSTPROCEDURE EVALUATION
Department of Anesthesiology  Postprocedure Note    Patient: Bernice Francisco  MRN: [id number]  YOB: 1960  Date of evaluation: 5/15/2024    Procedure Summary       Date: 05/15/24 Room / Location: [address]    Anesthesia Start: 0735 Anesthesia Stop: 0905    Procedure: RIGHT REVERSE TOTAL SHOULDER REPLACEMENT (Right: Shoulder) Diagnosis:       Degenerative joint disease of right shoulder      (Degenerative joint disease of right shoulder [M19.011])    Surgeons: Chaitanya Sanchez MD Responsible Provider: Sohail Swenson MD    Anesthesia Type: General, Regional ASA Status: 2            Anesthesia Type: General, Regional    Maikel Phase I: Maikel Score: 8    Maikel Phase II:      Anesthesia Post Evaluation    Patient location during evaluation: PACU  Patient participation: complete - patient participated  Level of consciousness: awake and sleepy but conscious  Airway patency: patent  Nausea & Vomiting: no nausea and no vomiting  Cardiovascular status: hemodynamically stable and blood pressure returned to baseline  Respiratory status: spontaneous ventilation, nonlabored ventilation and nasal cannula (Lungs clear, respirations unlabored.)  Hydration status: stable  Comments: Right arm in sling. Ms. Francisco was seen resting comfortably following her procedure. Expectations for resolution of block reviewed. Continued compliance with PAP after discharge encouraged. Will allow patient to become more alert before anticipated return to [facility] for planned discharge home with .   Multimodal analgesia pain management approach  Pain management: adequate and satisfactory to patient    No notable events documented.

## 2024-05-15 NOTE — ANESTHESIA PROCEDURE NOTES
appeared to be anatomically normal and there were no abnormal pathologically findings seen. Ms. Francisco tolerated the procedure well with 2mg midazolam. No complications evident.         Medications Administered  midazolam (VERSED) injection 2 mg/2mL - IntraVENous   2 mg - 5/15/2024 7:10:00 AM  BUPivacaine (MARCAINE) PF injection 0.5% - Perineural   12.5 mL - 5/15/2024 7:13:00 AM  BUPivacaine 0.5%-EPINEPHrine injection [id number] - Perineural   12.5 mL - 5/15/2024 7:13:00 AM  dexAMETHasone (DECADRON) injection 4 mg/mL - Perineural   2 mg - 5/15/2024 7:13:00 AM

## 2024-05-30 ENCOUNTER — OFFICE VISIT (OUTPATIENT)
Dept: ORTHOPEDIC SURGERY | Age: 64
End: 2024-05-30

## 2024-05-30 VITALS — RESPIRATION RATE: 16 BRPM | WEIGHT: 206 LBS | HEIGHT: 64 IN | BODY MASS INDEX: 35.17 KG/M2

## 2024-05-30 DIAGNOSIS — Z96.611 STATUS POST REVERSE TOTAL SHOULDER REPLACEMENT, RIGHT: Primary | ICD-10-CM

## 2024-05-30 PROCEDURE — 99024 POSTOP FOLLOW-UP VISIT: CPT | Performed by: ORTHOPAEDIC SURGERY

## 2024-05-30 NOTE — PROGRESS NOTES
[facility]  Office Visit    Chief Complaint: Follow-up for right reverse total shoulder arthroplasty    HPI:  Bernice Francisco is a 63 y.o. who is here in follow-up of right reverse total shoulder arthroplasty performed on May 15, 2024.  She is recovering well postoperatively.  She is off of narcotic pain medication.    Exam:  Appearance: sitting in exam room chair, appears to be in no acute distress, awake and alert  Resp: unlabored breathing on room air  Skin: warm, dry and intact with out erythema or significant increased temperature  RUE: Incision is healing as expected.  Ecchymosis is resolving.  Sensation intact light touch in axillary, radial, ulnar, median nerve evaluations.  She demonstrates active shoulder forward flexion to 90 degrees.    Imaging:  3 views of the right shoulder were performed and interpreted today.  Significant for reverse total shoulder arthroplasty prosthesis in place with no signs of osteolysis, loosening, fracture, dislocation.    Assessment:  Right reverse total shoulder arthroplasty    Plan:  She is recovering well postoperatively.  She may discontinue use of the sling.  She was referred to physical therapy.  She will follow-up in 4 weeks for repeat assessment.    This dictation was done with [employer] dictation and may contain mechanical errors related to translation.

## 2024-06-03 ENCOUNTER — HOSPITAL ENCOUNTER (OUTPATIENT)
Dept: PHYSICAL THERAPY | Age: 64
Setting detail: THERAPIES SERIES
Discharge: HOME | End: 2024-06-03
Payer: MEDICARE

## 2024-06-03 DIAGNOSIS — M25.511 ACUTE PAIN OF RIGHT SHOULDER: Primary | ICD-10-CM

## 2024-06-03 DIAGNOSIS — R53.1 WEAKNESS: ICD-10-CM

## 2024-06-03 PROCEDURE — 97110 THERAPEUTIC EXERCISES: CPT

## 2024-06-03 PROCEDURE — 97161 PT EVAL LOW COMPLEX 20 MIN: CPT

## 2024-06-03 NOTE — PLAN OF CARE
[] Not Met: [] Adjusted  2. Patient will have a decrease in pain to <0/10 to facilitate improvement in movement, function, and ADLs as indicated by Functional Deficits.  [] Progressing: [] Met: [] Not Met: [] Adjusted    Long Term Goals: To be achieved in: 16 weeks  1. Disability index score of 15% or less for the Upper Extremity functional Scale to assist with reaching prior level of function with activities such as reaching above shoulder height.  [] Progressing: [] Met: [] Not Met: [] Adjusted  2. Patient will demonstrate increased AROM of shoulder flex, abd, ER, IR to 150, 150, 60, T12 without pain to allow for proper joint functioning to enable patient to dress herself independently.   [] Progressing: [] Met: [] Not Met: [] Adjusted  3. Patient will demonstrate increased Strength of shoulder flex, abd, ER, IR to at least 5/5 throughout without pain to allow for proper functional mobility to enable patient to return to lifting household items above her head.   [] Progressing: [] Met: [] Not Met: [] Adjusted  4. Patient will return to lifting a bag of groceries above her head without increased symptoms or restriction.   [] Progressing: [] Met: [] Not Met: [] Adjusted  5. Patient will return to vacuuming, sweeping, or raking without increased symptoms or restriction.   [] Progressing: [] Met: [] Not Met: [] Adjusted     Overall Progression Towards Functional goals/ Treatment Progress Update:  [] Patient is progressing as expected towards functional goals listed.    [] Progression is slowed due to complexities/Impairments listed.  [] Progression has been slowed due to co-morbidities.  [x] Plan just implemented, too soon (<30days) to assess goals progression   [] Goals require adjustment due to lack of progress  [] Patient is not progressing as expected and requires additional follow up with physician  [] Other:     TREATMENT PLAN     Frequency/Duration: 1-2x/week for  16-20  weeks for the following treatment

## 2024-06-10 ENCOUNTER — HOSPITAL ENCOUNTER (OUTPATIENT)
Dept: PHYSICAL THERAPY | Age: 64
Setting detail: THERAPIES SERIES
Discharge: HOME | End: 2024-06-10
Payer: MEDICARE

## 2024-06-10 PROCEDURE — 97112 NEUROMUSCULAR REEDUCATION: CPT

## 2024-06-10 PROCEDURE — 97110 THERAPEUTIC EXERCISES: CPT

## 2024-06-10 NOTE — FLOWSHEET NOTE
[facility]- Outpatient Rehabilitation and Therapy [address], [place] [address] office: [phone] fax: [phone]         Physical Therapy: TREATMENT/PROGRESS NOTE   Patient: Berince Francisco (63 y.o. female)   Examination Date: 06/10/2024   :  1960 MRN: [id number]   Visit #: 2   Insurance Allowable Auth Needed   MN []Yes    [x]No    Insurance: Payor: MEDICARE / Plan: MEDICARE PART A AND B / Product Type: *No Product type* /   Insurance ID: [id number] - (Medicare)  Secondary Insurance (if applicable): [place] [facility] *   Treatment Diagnosis:   1. Acute pain of right shoulder  M25.511         2. Weakness  R53.1       Medical Diagnosis:  Presence of right artificial shoulder joint [Z96.611]   Referring Physician: Chaitanya Sanchez MD  PCP: Devora Lopez MD     Plan of care signed (Y/N): YES    Date of Patient follow up with Physician: 24     Progress Report/POC: NO  POC update due: (10 visits /OR AUTH LIMITS, whichever is less)  7/3/2024                                             Precautions/ Contra-indications:           Latex allergy:  NO  Pacemaker:    NO  Contraindications for Manipulation: NA  Date of Surgery: 5/15/24  Other:    Red Flags:  None    C-SSRS Triggered by Intake questionnaire:   Patient answered 'NO' to both behavioral questions on intake.  No further screening warranted    Preferred Language for Healthcare:   [x] English       [] other:    SUBJECTIVE EXAMINATION     Patient stated complaint: Pt is doing well. 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 She has had mild soreness with increased activity but is overall progressing well.     DOS 5/15/24  6/10/24: 3w5d       Test used Initial score  6/3/24 06/10/2024   Pain Summary VAS 0-1/10  3-4/10    Functional questionnaire Upper Extremity functional Scale 49/80  39% disability    Other:                OBJECTIVE EXAMINATION     6/3/24  ROM/Strength: (Blank cells denote NT)    Mvmt

## 2024-06-17 ENCOUNTER — HOSPITAL ENCOUNTER (OUTPATIENT)
Dept: PHYSICAL THERAPY | Age: 64
Setting detail: THERAPIES SERIES
Discharge: HOME | End: 2024-06-17
Payer: MEDICARE

## 2024-06-17 PROCEDURE — 97112 NEUROMUSCULAR REEDUCATION: CPT

## 2024-06-17 PROCEDURE — 97110 THERAPEUTIC EXERCISES: CPT

## 2024-06-17 NOTE — FLOWSHEET NOTE
strength training, ROM, and functional mobility  Therapeutic Activities (65982) including: functional mobility training and education.  Neuromuscular Re-education (27826) activation and proprioception, including postural re-education.    Manual Therapy (55200) as indicated to include: Passive Range of Motion, Gr I-IV mobilizations, Soft Tissue Mobilization, Dry Needling/IASTM, Trigger Point Release, and Myofascial Release  Modalities as needed that may include: Cryotherapy, Electrical Stimulation, Biofeedback, and Thermal Agents  Patient education on joint protection, postural re-education, activity modification, and progression of HEP    Plan: POC initiated as per evaluation    Electronically Signed by Leland Chaidez PT  Date: 06/17/2024     Note: Portions of this note have been templated and/or copied from initial evaluation, reassessments and prior notes for documentation efficiency.    Note: If patient does not return for scheduled/recommended follow up visits, this note will serve as a discharge from care along with the most recent update on progress.

## 2024-06-24 ENCOUNTER — HOSPITAL ENCOUNTER (OUTPATIENT)
Dept: PHYSICAL THERAPY | Age: 64
Setting detail: THERAPIES SERIES
Discharge: HOME | End: 2024-06-24
Payer: MEDICARE

## 2024-06-24 PROCEDURE — 97110 THERAPEUTIC EXERCISES: CPT

## 2024-06-24 PROCEDURE — 97112 NEUROMUSCULAR REEDUCATION: CPT

## 2024-06-24 NOTE — FLOWSHEET NOTE
goals  The patient has a musculoskeletal condition(s) with a corresponding ICD-10 code that is of complexity and severity that require skilled therapeutic intervention. This has a direct and significant impact on the need for therapy and significantly impacts the rate of recovery.     Return to Play: NA    Prognosis for POC: [x] Good [] Fair  [] Poor    Patient requires continued skilled intervention: [x] Yes  [] No      CHARGE CAPTURE     PT CHARGE GRID   CPT Code (TIMED) minutes # CPT Code (UNTIMED) #     Therex (59373)   2  EVAL:LOW (63373 - Typically 20 minutes face-to-face)     Neuromusc. Re-ed (97600)  1  Re-Eval (84183)     Manual (81122)    Estim Unattended (98627)     Ther. Act (85334)    Mech. Traction (49769)     Gait (57778)    Dry Needle 1-2 muscle (20560)     Aquatic Therex (51949)    Dry Needle 3+ muscle (20561)     Iontophoresis (83667)    VASO (09103)     Ultrasound (88274)    Group Therapy (23973)     Estim Attended (81213)    Canalith Repositioning (29036)     Other:    Other:    Total Timed Code Tx Minutes 46 3       Total Treatment Minutes 56        Charge Justification:  (91145) THERAPEUTIC EXERCISE - Provided verbal/tactile cueing for activities related to strengthening, flexibility, endurance, ROM performed to prevent loss of range of motion, maintain or improve muscular strength or increase flexibility, following either an injury or surgery.   (36752) HOME EXERCISE PROGRAM - Reviewed/Progressed HEP activities related to strengthening, flexibility, endurance, ROM performed to prevent loss of range of motion, maintain or improve muscular strength or increase flexibility, following either an injury or surgery.    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 Independent in HEP and progression per patient tolerance, in order to prevent re-injury.

## 2024-07-01 ENCOUNTER — HOSPITAL ENCOUNTER (OUTPATIENT)
Dept: PHYSICAL THERAPY | Age: 64
Setting detail: THERAPIES SERIES
Discharge: HOME | End: 2024-07-01
Payer: MEDICARE

## 2024-07-01 ENCOUNTER — OFFICE VISIT (OUTPATIENT)
Dept: ORTHOPEDIC SURGERY | Age: 64
End: 2024-07-01

## 2024-07-01 VITALS — WEIGHT: 206 LBS | HEIGHT: 64 IN | BODY MASS INDEX: 35.17 KG/M2 | RESPIRATION RATE: 16 BRPM

## 2024-07-01 DIAGNOSIS — Z96.611 STATUS POST REVERSE TOTAL SHOULDER REPLACEMENT, RIGHT: Primary | ICD-10-CM

## 2024-07-01 PROCEDURE — 99024 POSTOP FOLLOW-UP VISIT: CPT | Performed by: ORTHOPAEDIC SURGERY

## 2024-07-01 PROCEDURE — 97110 THERAPEUTIC EXERCISES: CPT

## 2024-07-01 PROCEDURE — 97112 NEUROMUSCULAR REEDUCATION: CPT

## 2024-07-01 NOTE — FLOWSHEET NOTE
severity that require skilled therapeutic intervention. This has a direct and significant impact on the need for therapy and significantly impacts the rate of recovery.     Return to Play: NA    Prognosis for POC: [x] Good [] Fair  [] Poor    Patient requires continued skilled intervention: [x] Yes  [] No      CHARGE CAPTURE     PT CHARGE GRID   CPT Code (TIMED) minutes # CPT Code (UNTIMED) #     Therex (04970)   2  EVAL:LOW (88513 - Typically 20 minutes face-to-face)     Neuromusc. Re-ed (94039)  1  Re-Eval (69296)     Manual (96955)    Estim Unattended (96271)     Ther. Act (63471)    Mech. Traction (82101)     Gait (28438)    Dry Needle 1-2 muscle (23640)     Aquatic Therex (81710)    Dry Needle 3+ muscle (20561)     Iontophoresis (06684)    VASO (45837)     Ultrasound (29553)    Group Therapy (36506)     Estim Attended (65180)    Canalith Repositioning (64516)     Other:    Other:    Total Timed Code Tx Minutes 40 3       Total Treatment Minutes 42        Charge Justification:  (15454) THERAPEUTIC EXERCISE - Provided verbal/tactile cueing for activities related to strengthening, flexibility, endurance, ROM performed to prevent loss of range of motion, maintain or improve muscular strength or increase flexibility, following either an injury or surgery.   (46634) HOME EXERCISE PROGRAM - Reviewed/Progressed HEP activities related to strengthening, flexibility, endurance, ROM performed to prevent loss of range of motion, maintain or improve muscular strength or increase flexibility, following either an injury or surgery.    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 Independent in HEP and progression per patient tolerance, in order to prevent re-injury.   [] Progressing: [] Met: [] Not Met: [] Adjusted  2. Patient will have a decrease in pain to <0/10 to facilitate

## 2024-07-01 NOTE — PROGRESS NOTES
[facility]  Office Visit    Chief Complaint: Follow-up for right reverse total shoulder arthroplasty    HPI:  Bernice Francisco is a 63 y.o. who is here in follow-up of right reverse total shoulder arthroplasty performed on May 15, 2024.  She reports she continues to recover well postoperatively.  She is working with physical therapy.  She rates pain as 2/10 at the worst.    Exam:  Appearance: sitting in exam room chair, appears to be in no acute distress, awake and alert  Resp: unlabored breathing on room air  Skin: warm, dry and intact with out erythema or significant increased temperature  RUE: Incision is healed. Sensation intact light touch in axillary, radial, ulnar, median nerve evaluations.  She demonstrates active shoulder forward flexion to 130 degrees.    Imaging:  None  Assessment:  Right reverse total shoulder arthroplasty    Plan:  She is recovering well postoperatively.  She will continue physical therapy exercises.  She will follow-up for repeat assessment and radiographs in 6 weeks.    This dictation was done with Dragon dictation and may contain mechanical errors related to translation.

## 2024-07-11 ENCOUNTER — HOSPITAL ENCOUNTER (OUTPATIENT)
Dept: PHYSICAL THERAPY | Age: 64
Setting detail: THERAPIES SERIES
Discharge: HOME | End: 2024-07-11
Payer: MEDICARE

## 2024-07-11 PROCEDURE — 97112 NEUROMUSCULAR REEDUCATION: CPT

## 2024-07-11 PROCEDURE — 97110 THERAPEUTIC EXERCISES: CPT

## 2024-07-11 NOTE — PLAN OF CARE
Patient will demonstrate increased Strength of shoulder flex, abd, ER, IR to at least 5/5 throughout without pain to allow for proper functional mobility to enable patient to return to lifting household items above her head.   [x] Progressing: [] Met: [] Not Met: [] Adjusted  4. Patient will return to lifting a bag of groceries above her head without increased symptoms or restriction.   [x] Progressing: [] Met: [] Not Met: [] Adjusted  5. Patient will return to vacuuming, sweeping, or raking without increased symptoms or restriction.   [x] Progressing: [] Met: [] Not Met: [] Adjusted     Overall Progression Towards Functional goals/ Treatment Progress Update:  [x] Patient is progressing as expected towards functional goals listed.    [] Progression is slowed due to complexities/Impairments listed.  [] Progression has been slowed due to co-morbidities.  [] Plan just implemented, too soon (<30days) to assess goals progression   [] Goals require adjustment due to lack of progress  [] Patient is not progressing as expected and requires additional follow up with physician  [] Other:     TREATMENT PLAN   Interventions:  Therapeutic Exercise (16178) including: strength training, ROM, and functional mobility  Therapeutic Activities (65760) including: functional mobility training and education.  Neuromuscular Re-education (25414) activation and proprioception, including postural re-education.    Manual Therapy (68019) as indicated to include: Passive Range of Motion, Gr I-IV mobilizations, Soft Tissue Mobilization, Dry Needling/IASTM, Trigger Point Release, and Myofascial Release  Modalities as needed that may include: Cryotherapy, Electrical Stimulation, Biofeedback, and Thermal Agents  Patient education on joint protection, postural re-education, activity modification, and progression of HEP    Plan: Alter current plan:  1-2x weekly for 4-6 weeks 7/11/24    Electronically Signed by Leland Chaidez PT  Date: 07/11/2024

## 2024-07-19 ENCOUNTER — HOSPITAL ENCOUNTER (OUTPATIENT)
Dept: PHYSICAL THERAPY | Age: 64
Setting detail: THERAPIES SERIES
Discharge: HOME | End: 2024-07-19
Payer: MEDICARE

## 2024-07-19 PROCEDURE — 97110 THERAPEUTIC EXERCISES: CPT

## 2024-07-19 PROCEDURE — 97112 NEUROMUSCULAR REEDUCATION: CPT

## 2024-07-19 NOTE — FLOWSHEET NOTE
lack of progress  [] Patient is not progressing as expected and requires additional follow up with physician  [] Other:     TREATMENT PLAN   Interventions:  Therapeutic Exercise (91655) including: strength training, ROM, and functional mobility  Therapeutic Activities (12004) including: functional mobility training and education.  Neuromuscular Re-education (07113) activation and proprioception, including postural re-education.    Manual Therapy (79507) as indicated to include: Passive Range of Motion, Gr I-IV mobilizations, Soft Tissue Mobilization, Dry Needling/IASTM, Trigger Point Release, and Myofascial Release  Modalities as needed that may include: Cryotherapy, Electrical Stimulation, Biofeedback, and Thermal Agents  Patient education on joint protection, postural re-education, activity modification, and progression of HEP    Plan: Alter current plan:  1-2x weekly for 4-6 weeks 7/11/24    Electronically Signed by Leland Chaidez PT  Date: 07/19/2024     Note: Portions of this note have been templated and/or copied from initial evaluation, reassessments and prior notes for documentation efficiency.    Note: If patient does not return for scheduled/recommended follow up visits, this note will serve as a discharge from care along with the most recent update on progress.

## 2024-07-26 ENCOUNTER — HOSPITAL ENCOUNTER (OUTPATIENT)
Dept: PHYSICAL THERAPY | Age: 64
Setting detail: THERAPIES SERIES
Discharge: HOME | End: 2024-07-26
Payer: MEDICARE

## 2024-07-26 PROCEDURE — 97112 NEUROMUSCULAR REEDUCATION: CPT

## 2024-07-26 PROCEDURE — 97110 THERAPEUTIC EXERCISES: CPT

## 2024-07-26 NOTE — FLOWSHEET NOTE
[facility]- Outpatient Rehabilitation and Therapy [address], [place] [address] office: [phone] fax: [phone]       Physical Therapy: TREATMENT/PROGRESS NOTE   Patient: Bernice Francisco (63 y.o. female)   Examination Date: 2024   :  1960 MRN: [id number]   Visit #: 8   Insurance Allowable Auth Needed   MN []Yes    [x]No    Insurance: Payor: MEDICARE / Plan: MEDICARE PART A AND B / Product Type: *No Product type* /   Insurance ID: [id number] - (Medicare)  Secondary Insurance (if applicable): [place] [facility] *   Treatment Diagnosis:   1. Acute pain of right shoulder  M25.511         2. Weakness  R53.1       Medical Diagnosis:  Presence of right artificial shoulder joint [Z96.611]   Referring Physician: Chaitanya Sanchez MD  PCP: Devora Lopez MD     Plan of care signed (Y/N): YES    Date of Patient follow up with Physician: 24     Progress Report/POC: NO  POC update due: (10 visits /OR AUTH LIMITS, whichever is less)  2024                                             Precautions/ Contra-indications:           Latex allergy:  NO  Pacemaker:    NO  Contraindications for Manipulation: NA  Date of Surgery: 5/15/24  Other:    Red Flags:  None    C-SSRS Triggered by Intake questionnaire:   Patient answered 'NO' to both behavioral questions on intake.  No further screening warranted    Preferred Language for Healthcare:   [x] English       [] other:    SUBJECTIVE EXAMINATION     Patient stated complaint: Pt is doing well. She has noticed increased muscle soreness with addition of resistance on last visit that lasts <24 hours after workouts.     DOS 5/15/24  7/26/24: 10w2d       Test used Initial score  6/3/24 2024   Pain Summary VAS 0-1/10  3-4/10 0/10   Functional questionnaire Upper Extremity functional Scale 49/80  39% disability 67/80  16% disability   Other:                OBJECTIVE EXAMINATION     24  ROM/Strength: (Blank cells denote

## 2024-08-02 ENCOUNTER — HOSPITAL ENCOUNTER (OUTPATIENT)
Dept: PHYSICAL THERAPY | Age: 64
Setting detail: THERAPIES SERIES
Discharge: HOME | End: 2024-08-02
Payer: MEDICARE

## 2024-08-02 PROCEDURE — 97110 THERAPEUTIC EXERCISES: CPT

## 2024-08-02 PROCEDURE — 97112 NEUROMUSCULAR REEDUCATION: CPT

## 2024-08-02 NOTE — FLOWSHEET NOTE
[facility]- Outpatient Rehabilitation and Therapy [address], [place] [address] office: [phone] fax: [phone]       Physical Therapy: TREATMENT/PROGRESS NOTE   Patient: Bernice Francisco (63 y.o. female)   Examination Date: 2024   :  1960 MRN: [id number]   Visit #: 9   Insurance Allowable Auth Needed   MN []Yes    [x]No    Insurance: Payor: MEDICARE / Plan: MEDICARE PART A AND B / Product Type: *No Product type* /   Insurance ID: [id number] - (Medicare)  Secondary Insurance (if applicable): [place] [facility] *   Treatment Diagnosis:   1. Acute pain of right shoulder  M25.511         2. Weakness  R53.1       Medical Diagnosis:  Presence of right artificial shoulder joint [Z96.611]   Referring Physician: Chaitanya Sanchez MD  PCP: Devora Lopez MD     Plan of care signed (Y/N): YES    Date of Patient follow up with Physician: 24     Progress Report/POC: NO  POC update due: (10 visits /OR AUTH LIMITS, whichever is less)  2024                                             Precautions/ Contra-indications:           Latex allergy:  NO  Pacemaker:    NO  Contraindications for Manipulation: NA  Date of Surgery: 5/15/24  Other:    Red Flags:  None    C-SSRS Triggered by Intake questionnaire:   Patient answered 'NO' to both behavioral questions on intake.  No further screening warranted    Preferred Language for Healthcare:   [x] English       [] other:    SUBJECTIVE EXAMINATION     Patient stated complaint: Pt is doing well. She continues to feel mild soreness after band exercise that resolves quickly.     DOS 5/15/24  8/2/24: 11w2d       Test used Initial score  6/3/24 2024   Pain Summary VAS 0-1/10  3-4/10 0/10   Functional questionnaire Upper Extremity functional Scale 49/80  39% disability 67/80  16% disability   Other:                OBJECTIVE EXAMINATION     24  ROM/Strength: (Blank cells denote NT)    Mvmt (norm) AROM L AROM R Notes PROM L

## 2024-08-09 ENCOUNTER — HOSPITAL ENCOUNTER (OUTPATIENT)
Dept: PHYSICAL THERAPY | Age: 64
Setting detail: THERAPIES SERIES
Discharge: HOME | End: 2024-08-09
Payer: MEDICARE

## 2024-08-09 PROCEDURE — 97110 THERAPEUTIC EXERCISES: CPT

## 2024-08-09 PROCEDURE — 97112 NEUROMUSCULAR REEDUCATION: CPT

## 2024-08-09 NOTE — PLAN OF CARE
Typically 20 minutes face-to-face)     Neuromusc. Re-ed (38816)  1  Re-Eval (19530)     Manual (93883)    Estim Unattended (89253)     Ther. Act (91557)    Mech. Traction (91637)     Gait (45882)    Dry Needle 1-2 muscle (84999)     Aquatic Therex (71166)    Dry Needle 3+ muscle (20561)     Iontophoresis (83641)    VASO (51181)     Ultrasound (92354)    Group Therapy (22136)     Estim Attended (64732)    Canalith Repositioning (65754)     Other:    Other:    Total Timed Code Tx Minutes 30 2       Total Treatment Minutes 30        Charge Justification:  (99519) THERAPEUTIC EXERCISE - Provided verbal/tactile cueing for activities related to strengthening, flexibility, endurance, ROM performed to prevent loss of range of motion, maintain or improve muscular strength or increase flexibility, following either an injury or surgery.   (85291) HOME EXERCISE PROGRAM - Reviewed/Progressed HEP activities related to strengthening, flexibility, endurance, ROM performed to prevent loss of range of motion, maintain or improve muscular strength or increase flexibility, following either an injury or surgery.    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 Independent in HEP and progression per patient tolerance, in order to prevent re-injury.   [] Progressing: [x] Met: [] Not Met: [] Adjusted  2. Patient will have a decrease in pain to <0/10 to facilitate improvement in movement, function, and ADLs as indicated by Functional Deficits.  [] Progressing: [x] Met: [] Not Met: [] Adjusted    Long Term Goals: To be achieved in: 16 weeks  1. Disability index score of 15% or less for the Upper Extremity functional Scale to assist with reaching prior level of function with activities such as reaching above shoulder height.  [] Progressing: [x] Met: [] Not Met: [] Adjusted  2. Patient will

## 2024-08-12 ENCOUNTER — OFFICE VISIT (OUTPATIENT)
Dept: ORTHOPEDIC SURGERY | Age: 64
End: 2024-08-12

## 2024-08-12 VITALS — BODY MASS INDEX: 35.36 KG/M2 | HEIGHT: 64 IN

## 2024-08-12 DIAGNOSIS — Z96.611 STATUS POST REVERSE TOTAL SHOULDER REPLACEMENT, RIGHT: Primary | ICD-10-CM

## 2024-08-12 PROCEDURE — 99024 POSTOP FOLLOW-UP VISIT: CPT | Performed by: PHYSICIAN ASSISTANT

## 2024-08-12 NOTE — PROGRESS NOTES
This dictation was done with [employer] dictation and may contain mechanical errors related to translation.  Height 1.626 m (5' 4\"), last menstrual period 09/08/2012, not currently breastfeeding.    This is a pleasant 63-year-old female who is status post right reverse total shoulder from May 15, 2024.  Her incision is healing nicely neurologically she is intact to the hand she has some restriction of motion which is fairly typical at this point but she has had no feelings of instability which is key.    The AP transaxillary view and Y view show excellent alignment sizing and fit of the right reverse total shoulder with no lucencies good screw fixation into the glenoid or any other bony abnormalities.  I did review these with the patient    My impression is stable healing right reverse total shoulder.    She needs to finish out doing the exercises I will see her on a as needed basis during the next year and at 1 year postop for x-rays

## 2024-08-23 ENCOUNTER — HOSPITAL ENCOUNTER (OUTPATIENT)
Dept: PHYSICAL THERAPY | Age: 64
Setting detail: THERAPIES SERIES
Discharge: HOME | End: 2024-08-23
Payer: MEDICARE

## 2024-08-23 PROCEDURE — 97112 NEUROMUSCULAR REEDUCATION: CPT

## 2024-08-23 PROCEDURE — 97530 THERAPEUTIC ACTIVITIES: CPT

## 2024-08-23 NOTE — PLAN OF CARE
[facility]- Outpatient Rehabilitation and Therapy [address], [place] [address] office: [phone] fax: [phone]     Physical Therapy Discharge Plan of Care     Dear Dr. Sanchez ,     We had the pleasure of treating the following patient for physical therapy services at [facility].  A summary of our findings can be found in the updated assessment below.  This includes our plan of care.  If you have any questions or concerns regarding these findings, please do not hesitate to contact me at [phone].  Thank you for the referral.      Physician Signature:________________________________Date:__________________  By signing above (or electronic signature), therapist’s plan is approved by physician        Overall Response to Treatment:              [x]Patient is responding well to treatment and improvement is noted with regards  to goals              []Patient should continue to improve in reasonable time if they continue HEP              []Patient has plateaued and is no longer responding to skilled PT intervention                   []Patient is getting worse and would benefit from return to referring MD              []Patient unable to adhere to initial POC              [x]Other: Patient has responded well to formal therapy and is s/p ~14 weeks Rt RTSA on 5/15/24. She presents with near full ROM, improved strength, and tolerance to functional activities. She was educated on HEP updates and progressions with detailed instructions of how to continue progressing independently. She verbalized good understanding. At this stage patient is prepared and agreeable for discharge from formal therapy. She was instructed to contact us with questions or concerns.      Date range of Visits: 24--24  Total Visits: 11      Physical Therapy: TREATMENT/PROGRESS NOTE   Patient: Bernice Francisco (63 y.o. female)   Examination Date: 2024   :  1960 MRN:

## 2025-08-14 ENCOUNTER — OFFICE VISIT (OUTPATIENT)
Dept: ORTHOPEDIC SURGERY | Age: 65
End: 2025-08-14

## 2025-08-14 VITALS — BODY MASS INDEX: 35.17 KG/M2 | WEIGHT: 206 LBS | HEIGHT: 64 IN

## 2025-08-14 DIAGNOSIS — Z96.611 STATUS POST REVERSE TOTAL SHOULDER REPLACEMENT, RIGHT: Primary | ICD-10-CM

## (undated) DEVICE — Z DUP USE 2715602 GUIDEWIRE SURG L220MM DIA25MM SHLDR FOR GLEN KEELED AEQUALIS

## (undated) DEVICE — SYRINGE MED 30ML STD CLR PLAS LUERLOCK TIP N CTRL DISP

## (undated) DEVICE — COVER LT HNDL BLU PLAS

## (undated) DEVICE — DRAPE C ARM UNIV W41XL74IN CLR PLAS XR VELC CLSR POLY STRP

## (undated) DEVICE — SYRINGE IRRIG 60ML SFT PLIABLE BLB EZ TO GRP 1 HND USE W/

## (undated) DEVICE — BASIC SINGLE BASIN 1-LF: Brand: MEDLINE INDUSTRIES, INC.

## (undated) DEVICE — SPONGE LAP W18XL18IN WHT COT 4 PLY FLD STRUNG RADPQ DISP ST

## (undated) DEVICE — GLOVE ORTHO 8   MSG9480

## (undated) DEVICE — SUTURE MONOCRYL STRATAFIX SPRL SZ 3-0 L12IN ABSRB UD FS-1 L30X30CM SXMP2B410

## (undated) DEVICE — GOWN,REINF,POLY,AURORA,XLNG/XXL,STRL: Brand: MEDLINE

## (undated) DEVICE — 3M™ IOBAN™ 2 ANTIMICROBIAL INCISE DRAPE 6640EZ: Brand: IOBAN™ 2

## (undated) DEVICE — INTENDED FOR TISSUE SEPARATION, AND OTHER PROCEDURES THAT REQUIRE A SHARP SURGICAL BLADE TO PUNCTURE OR CUT.: Brand: BARD-PARKER ® STAINLESS STEEL BLADES

## (undated) DEVICE — COVER,TABLE,77X90,STERILE: Brand: MEDLINE

## (undated) DEVICE — PAD,NON-ADHERENT,3X8,STERILE,LF,1/PK: Brand: MEDLINE

## (undated) DEVICE — HYPODERMIC SAFETY NEEDLE: Brand: MONOJECT

## (undated) DEVICE — GLOVE SURG SZ 85 L12IN FNGR ORTHO 126MIL CRM LTX FREE

## (undated) DEVICE — GLOVE,SURG,SENSICARE SLT,LF,PF,8.5: Brand: MEDLINE

## (undated) DEVICE — SUTURE STRATAFIX SPRL SZ 2 0 L14IN ABSRB UD MH L36MM 1 2 CIR SXMD2B401

## (undated) DEVICE — SUTURE ETHIBOND EXCEL SZ 0 L18IN NONABSORBABLE GRN L26MM MO-6 CX45D

## (undated) DEVICE — DRAPE,SPLIT ,77X120: Brand: MEDLINE

## (undated) DEVICE — GUIDE GLEN REVERSED BLUEPRINT

## (undated) DEVICE — KIT SHLDR STBL MARCO FOR SPIDER LIMB POS

## (undated) DEVICE — IMMOBILIZER SHLDR L L9X19.5IN R/L CANVS W/ WAIST STRP THMB

## (undated) DEVICE — KIT OR ROOM TURNOVER W/STRAP

## (undated) DEVICE — GLOVE,SURG,SENSICARE SLT,LF,PF,8: Brand: MEDLINE

## (undated) DEVICE — ADHESIVE SKIN CLOSURE WND 8.661X1.5 IN 22 CM LIQUIBAND SECUR

## (undated) DEVICE — SOLUTION PREP POVIDONE IOD FOR SKIN MUCOUS MEM PRIOR TO

## (undated) DEVICE — CHLORAPREP 26ML ORANGE

## (undated) DEVICE — 4-PORT MANIFOLD: Brand: NEPTUNE 2

## (undated) DEVICE — ELECTRODE PT RET AD L9FT HI MOIST COND ADH HYDRGEL CORDED

## (undated) DEVICE — DECANTER BAG 9": Brand: MEDLINE INDUSTRIES, INC.

## (undated) DEVICE — SYRINGE MED 10ML LUERLOCK TIP W/O SFTY DISP

## (undated) DEVICE — HYPODERMIC SAFETY NEEDLE: Brand: MAGELLAN

## (undated) DEVICE — SHOULDER STABILIZATION KIT,                                    DISPOSABLE 12 PER BOX

## (undated) DEVICE — INTENDED TO SUPPORT AND MAINTAIN THE POSITION OF AN ANESTHETIZED PATIENT DURING SURGERY: Brand: ERIN BEACH CHAIR FACE MASK

## (undated) DEVICE — C-ARM: Brand: UNBRANDED

## (undated) DEVICE — SOLUTION IV IRRIG POUR BRL 0.9% SODIUM CHL 2F7124

## (undated) DEVICE — NEEDLE HYPO 18GA L1.5IN THN WALL PIVOTING SHLD BVL ORIENTED

## (undated) DEVICE — GLOVE SURG SZ 85 L12IN FNGR THK13MIL WHT ISOLEX POLYISOPRENE

## (undated) DEVICE — DECANTER BTL 6IN: Brand: MEDLINE INDUSTRIES, INC.

## (undated) DEVICE — MERCY HEALTH WEST TURNOVER: Brand: MEDLINE INDUSTRIES, INC.

## (undated) DEVICE — SOLUTION PREP PAINT POV IOD FOR SKIN MUCOUS MEM

## (undated) DEVICE — SUTURE ETHBND EXCEL SZ 0 L18IN NONABSORBABLE GRN L26MM MO-6 CX45D

## (undated) DEVICE — Z DUP USE 2715828 BIT DRL DIA3.2MM PERIPH SCR FOR AEQUALIS PERFORM REVERSED

## (undated) DEVICE — 3M™ STERI-DRAPE™ U-DRAPE 1067 1067 5/BX 4BX/CS/CTN&#X20;: Brand: STERI-DRAPE™

## (undated) DEVICE — TOTAL BASIC: Brand: MEDLINE INDUSTRIES, INC.

## (undated) DEVICE — TOTAL BASIC PK

## (undated) DEVICE — DUAL CUT SAGITTAL BLADE

## (undated) DEVICE — CANISTER, RIGID, 1200CC: Brand: MEDLINE INDUSTRIES, INC.

## (undated) DEVICE — Z DISCONTINUED USE 2716304 SUTURE STRATAFIX SPRL SZ 3-0 L12IN ABSRB UD FS-1 L24MM 3/8

## (undated) DEVICE — GLOVE SURG SZ 8 L12IN FNGR THK79MIL GRN LTX FREE

## (undated) DEVICE — 3M™ STERI-DRAPE™ INSTRUMENT POUCH 1018: Brand: STERI-DRAPE™

## (undated) DEVICE — SHEET,DRAPE,53X77,STERILE: Brand: MEDLINE

## (undated) DEVICE — FORCEPS BX 240CM 2.4MM L NDL RAD JAW 4 M00513334

## (undated) DEVICE — SUTURE STRATAFIX SPRL SZ 2 0 L14IN ABSRB UD MH L36MM 1 2 CIR SXMP2B401

## (undated) DEVICE — SYSTEM SKIN CLSR 22CM DERMBND PRINEO

## (undated) DEVICE — SUTURE ETHIBOND EXCEL SZ 0 L30IN NONABSORBABLE GRN CT1 L36MM X424H